# Patient Record
Sex: FEMALE | Race: WHITE | HISPANIC OR LATINO | Employment: UNEMPLOYED | URBAN - METROPOLITAN AREA
[De-identification: names, ages, dates, MRNs, and addresses within clinical notes are randomized per-mention and may not be internally consistent; named-entity substitution may affect disease eponyms.]

---

## 2017-01-04 ENCOUNTER — ALLSCRIPTS OFFICE VISIT (OUTPATIENT)
Dept: OTHER | Facility: OTHER | Age: 22
End: 2017-01-04

## 2017-08-29 ENCOUNTER — HOSPITAL ENCOUNTER (EMERGENCY)
Facility: HOSPITAL | Age: 22
Discharge: HOME | End: 2017-08-29
Attending: EMERGENCY MEDICINE | Admitting: EMERGENCY MEDICINE
Payer: COMMERCIAL

## 2017-08-29 VITALS
SYSTOLIC BLOOD PRESSURE: 127 MMHG | OXYGEN SATURATION: 98 % | HEART RATE: 74 BPM | TEMPERATURE: 98 F | BODY MASS INDEX: 18.18 KG/M2 | RESPIRATION RATE: 16 BRPM | WEIGHT: 90 LBS | DIASTOLIC BLOOD PRESSURE: 77 MMHG

## 2017-08-29 DIAGNOSIS — S05.02XA CORNEAL ABRASION, LEFT, INITIAL ENCOUNTER: Primary | ICD-10-CM

## 2017-08-29 PROCEDURE — 99283 EMERGENCY DEPT VISIT LOW MDM: CPT

## 2017-08-29 RX ORDER — PROPARACAINE HYDROCHLORIDE 5 MG/ML
1 SOLUTION/ DROPS OPHTHALMIC ONCE
Status: COMPLETED | OUTPATIENT
Start: 2017-08-29 | End: 2017-08-29

## 2017-08-29 RX ORDER — ERYTHROMYCIN 5 MG/G
0.5 OINTMENT OPHTHALMIC ONCE
Status: COMPLETED | OUTPATIENT
Start: 2017-08-29 | End: 2017-08-29

## 2017-08-29 RX ADMIN — ERYTHROMYCIN 0.5 INCH: 5 OINTMENT OPHTHALMIC at 20:27

## 2017-08-29 RX ADMIN — PROPARACAINE HYDROCHLORIDE 1 DROP: 5 SOLUTION/ DROPS OPHTHALMIC at 19:39

## 2017-08-29 RX ADMIN — FLUORESCEIN SODIUM 1 STRIP: 1 STRIP OPHTHALMIC at 19:39

## 2018-01-10 NOTE — RESULT NOTES
Message  Obtained HSV results via [facility] portal,GC/CT still pending   Results to RN work pile to scan and task      Signatures   Electronically signed by : Lynn Paris, ; Dec  8 2016  3:57PM EST                       (Author)

## 2018-01-10 NOTE — MISCELLANEOUS
Reason For Visit  Reason For Visit Free Text Note Form: PN PATIENT SEEN FOR ASSESS  Case Management Documentation [facility]:   Information obtained from the patient, patient's significant other and medical record  Patient's financial status unemployed  She is also dealing with additional issues such as language/communication barrier  Patient is participating in Adelina Schulte  Progress Note  MET WITH 19 Y/O- S- G1- - Greek SPEAKING WOMAN  RESIDES WITH FOB  PLAN PREGNANCY  PATIENT DENIES ANY USAGE OF DRUG, ALCOHOL OR SMOKING  HISTORY OF ANXIETY, NEVER TREATED  DENIES ANY DEPRESSION OR ANXIETY AT PRESENT TIME  WILL CALL WIC FOR APPOINTMENT  PATIENT MAIN CONCERN IS THE N / V  ASSISTED WITH INTERPRETATION DURING PRENATAL INTERVIEW  ALSO ASSISTED TO SCHEDULE U/S APPOINTMENT AT [facility]  WILL CONTACT  AS NEEDED  Allergies    1  No Known Drug Allergies    Future Appointments    Date/Time Provider Specialty Site   2016 02:15 PM RICHARD Heredia   Family Medicine [facility]  [address]   2016 02:00 PM  [facility], [address]   Electronically signed by : JENNIE Cho; 2016 10:59AM EST                       (Author)

## 2018-01-11 NOTE — PROGRESS NOTES
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  At the time of our genetic counseling session, Landon Primrose was approximately 12 weeks 6 days pregnant  Counseling was difficult given the language barrier despite a , Drema Brittle, being present to act as   We reviewed the autosomal recessive inheritance of CF and the importance of the patient's partner being tested  He was planning on picking up the test requisition from the clinic and going for blood work as soon as possible  Discussed that if he is also identified as being a CF carrier then there would be a 1/4 or 25% having an affected child and prenatal diagnosis would be available  Explained that if Didi Danni remained negative there will still be a remaining risk for him to be a CF carrier  During our counseling session, histories were taken on the patient's family and her partner's family  Landon Primrose reports having a female maternal first cousin once removed, the daughter of her mother's sister's son, who is in a wheelchair  In addition, this child who is presently 15years of age does not speak and has significant developmental delay  Cedrick Primrose was not able to be specific regarding the cause of the problems in this child therefore is not possible to clearly define any risks that this history could pose to her current pregnancy  Didi Razo reports that one of his sisters had a stillbirth of a pregnancy at approximately 20 weeks gestation  In addition, he described one of his nephews as being "slow"  He was not able to be specific regarding the diagnosis for the Matthew present in these individuals therefore it is not possible to determine whether or not this history would pose any risk for similar problems to the patient's current pregnancy  Both Landon Primrose and Didi Razo report being of [place] descent  They were advised of the availability of carrier screening for SMA and Fragile X syndrome which they elected to decline  Hemoglobinopathy screening is recommended if not previously performed  Lastly, we discussed the fact that it is important to keep in mind that everyone in the general population regardless of age, family history, or medical background has approximately a 3% risk of having a child with some type of her defect, genetic disease or intellectual disability  Currently there are no tests available to rule out all birth defects or health problems  Part of this risk is an age-specific risk for chromosome abnormalities  The patient's age of 25, there is an estimated 1/555 for a fetal chromosome abnormality at term  A proximally half of these abnormalities are due to Down syndrome and the remainder due to other chromosomal abnormalities  This couple was advised of the availability and limitations of sequential screening which they also elected to decline  Active Problems    1  Constipation (564 00) (K59 00)   2  Constipation (564 00) (K59 00)   3  Encounter for pregnancy related examination in second trimester (V22 1) (Z34 82)   4  Encounter to determine fetal viability of pregnancy (V23 87) (O36 80X0)   5  Nausea and vomiting in pregnancy (643 90) (O21 9)   6  Nausea/vomiting in pregnancy (643 90) (O21 9)   7  Supervision of normal first teen pregnancy in first trimester (V22 0) (Z34 01)    Past Medical History    1  History of Patient denies medical problems (V49 89) (Z78 9)    Family History    1  No pertinent family history   2  No pertinent family history    3  No pertinent family history   4  No pertinent family history    Social History    · Never a smoker   · Never a smoker    Current Meds   1  B-6 100 MG Oral Tablet; TAKE 1 TABLET DAILY AS DIRECTED; Therapy: 13Apr2016 to (Evaluate:11Aug2016)  Requested for: 13Apr2016; Last   Rx:13Apr2016 Ordered   2   Colace 100 MG Oral Capsule; TAKE 1 CAPSULE TWICE DAILY AS NEEDED; Therapy: 2016 to (Evaluate:13Dia9194)  Requested for: 2016; Last   Rx:2016 Ordered   3  Colace 100 MG Oral Capsule; Therapy: 2016 to Recorded   4  Prenatal Plus/Iron 27-1 MG Oral Tablet; TAKE 1 TABLET DAILY; Therapy: 2016 to (Evaluate:2017)  Requested for: 2016; Last   Rx:38Hyh2113 Ordered   5  Prenatal Plus/Iron 27-1 MG Oral Tablet; Therapy: 2016 to Recorded   6  Unisom 25 MG Oral Tablet; TAKE 0 5 TABLET 3 TIMES DAILY; Therapy: 2016 to (Evaluate:93Cvj3414)  Requested for: 2016; Last   Rx:2016 Ordered   7  Unisom 25 MG Oral Tablet; TAKE 0 5 TABLET 3 TIMES DAILY; Therapy: 2016 to (Evaluate:55Jhb6898) Recorded   8  Vitamin B-6 100 MG Oral Tablet; Therapy: 2016 to Recorded    Allergies    1  No Known Drug Allergies    2  No Known Environmental Allergies   3   No Known Food Allergies    Future Appointments    Date/Time Provider Specialty Site   2016 10:00 AM  Rory, [address] [place] [address] Road   2016 12:45 PM [facility], Physician Schedule  [address]     Signatures   Electronically signed by : Araceli Ochoa, ; May 11 2016  2:23PM EST                       (Author)

## 2018-01-11 NOTE — RESULT NOTES
Message  Printed GC/CT results 1  from [employer] ,to Segundo Allen       1 Amended By: Graciela Dodd; May 02 2016 4:49 PM EST    Signatures   Electronically signed by : Aarti Titus, ; May  2 2016  4:49PM EST                       (Author)

## 2018-01-12 VITALS
SYSTOLIC BLOOD PRESSURE: 107 MMHG | HEIGHT: 59 IN | BODY MASS INDEX: 19.35 KG/M2 | DIASTOLIC BLOOD PRESSURE: 73 MMHG | WEIGHT: 96 LBS

## 2018-01-13 NOTE — MISCELLANEOUS
Reason For Visit  Reason For Visit Free Text Note Form: SW MET WITH PATIENT DURING POST PARTUM VISIT FOR FOLLOW UP  PATIENT REPORTED SHE IS DOING WELL  DENIES ANY POST PARTUM DEPRESSION SIGNS  ASSISTED WITH INTERPRETATION  PATIENT DESIRE DEPO AS CONTRACEPTION  NO CONCERN AT THIS TIME  Active Problems    1  6 weeks postpartum follow-up (V24 2) (Z39 2)   2  Abnormal placenta, antepartum (656 73) (O43 899)   3  Allergic symptoms, initial encounter (995 3) (T78 40XA)   4  Cholestasis of pregnancy (646 70) (O26 619,K83 1)   5  Constipation (564 00) (K59 00)   6  Cystic fibrosis carrier (V83 81) (Z14 1)   7  Echogenic focus of heart of fetus affecting antepartum care of mother (655 83)   (O35 8XX0)   8  Encounter for initial prescription of injectable contraceptive (V25 02) (Z30 013)   9  Heart burn (787 1) (R12)    Current Meds   1  Docusate Sodium 100 MG Oral Tablet; Take 1 tablet twice daily; Therapy: 48WUX2934 to (Evaluate:30Mar2017)  Requested for: 17PUU7685; Last   Rx:30Nov2016 Ordered   2  Loratadine 10 MG Oral Tablet; take 1 tablet daily as needed; Therapy: 93RVD7135 to (Evaluate:16Nov2016)  Requested for: 94WPT5110; Last   Rx:36Qym7243 Ordered   3  MedroxyPROGESTERone Acetate 150 MG/ML Intramuscular Suspension; INJECT   INTRAMUSCULARLY AS DIRECTED; Therapy: 46JHC4238 to (Last Rx:30Nov2016) Ordered   4  Prenatal Plus/Iron 27-1 MG TABS; TAKE 1 TABLET DAILY; Therapy: 98Gil0969 to (Evaluate:08Jan2017)  Requested for: 13Apr2016; Last   Rx:13Apr2016 Ordered    Allergies    1  No Known Drug Allergies    2  No Known Environmental Allergies   3   No Known Food Allergies    Future Appointments    Date/Time Provider Specialty Site   02/16/2017 09:40 AM [facility], Injection Schedule  [facility] [place]   12/02/2016 10:45 AM [facility] OB Chief, Schedule  [address]     Signatures   Electronically signed by : JENNIE Hernandez; Nov 30 2016  3:46PM EST (Author)

## 2018-01-13 NOTE — RESULT NOTES
Message  Printed GC/CT results from [facility] portal to RN work pile to be scanned and tasked

## 2018-01-14 NOTE — MISCELLANEOUS
Reason For Visit  Reason For Visit Free Text Note Form: MET WITH PATIENT AND FOB AT THEIR REQUEST  PATIENT WITH QUESTION REGARDING INSURANCE  ISSUES ADDRESSED  NO OTHER CONCERN AT THIS TIME  Active Problems    1  Abnormal placenta affecting management of mother in first trimester (656 73) (O43 91)   2  Abnormal placenta affecting management of mother in second trimester (656 73)   (O43 92)   3  Abnormal placenta, antepartum (656 73) (O43 899)   4  Allergic symptoms, initial encounter (995 3) (T78 40XA)   5  Constipation (564 00) (K59 00)   6  Cystic fibrosis carrier (V83 81) (Z14 1)   7  Echogenic focus of heart of fetus affecting antepartum care of mother (655 83)   (O35 8XX0)   8  Encounter for fetal anatomic survey (V28 81) (Z36)   9  Encounter for prenatal care in second trimester of first pregnancy (V22 0) (Z34 02)   10  Encounter for screening for risk of pre-term labor (V28 82) (Z36)   11  Genetic carrier status (V83 89) (Z14 8)   12  Heart burn (787 1) (R12)   13  Nausea and vomiting in pregnancy (643 90) (O21 9)   14  Normal pregnancy (V22 2) (Z34 90)   15  Supervision of normal first teen pregnancy in first trimester (V22 0) (Z34 01)   16  Ultrasound for  screening for fetal growth restriction (V28 4) (Z36)    Current Meds   1  Aspirin 81 MG Oral Tablet Chewable; CHEW AND SWALLOW 1 TABLET DAILY; Therapy: 97IUR9030 to (Evaluate:2017) Recorded   2  Heartburn Relief 75 MG Oral Tablet; TAKE 1 TABLET EVERY 12 HOURS DAILY; Therapy: 29Igz3237 to (Evaluate:2017)  Requested for: 20Sxm0965; Last   Rx:62Alw8812 Ordered   3  Loratadine 10 MG Oral Tablet; take 1 tablet daily as needed; Therapy: 66MWX2611 to (Evaluate:2016)  Requested for: 92ARA2734; Last   Rx:20Omh8757 Ordered   4  Prenatal Plus/Iron 27-1 MG TABS; TAKE 1 TABLET DAILY; Therapy: 34Jhq5726 to (Evaluate:2017)  Requested for: 71Ieh4049; Last   Rx:02Zdx5432 Ordered    Allergies    1  No Known Drug Allergies    2  No Known Environmental Allergies   3   No Known Food Allergies    Future Appointments    Date/Time Provider Specialty Site   10/12/2016 11:30 AM  Rory, [address]   2016 10:30 AM [facility] Chief Resident, Schedule  [facility]  [address]     Signatures   Electronically signed by : JENNIE Garcia; Sep 13 2016 12:07PM EST                       (Author)

## 2018-01-14 NOTE — PROGRESS NOTES
2016         RE: Christiano Deras                                  To: [facility]   MR#: [id number]                                  [address] [place] [address]   : [facility], [address]   ENC: [id number]                             Fax: [phone]   (Exam #: [id number])      The LMP of this 25year old,  G1, P0-0-0-0 patient was unknown, her   working TANIA is 2016 and the current gestational age is 25 weeks 6   days by [address] [place] [address]  A sonographic examination was performed on 2016 using real time equipment  The ultrasound examination was performed   using abdominal & vaginal techniques  The patient has a BMI of 19 6  Her   blood pressure today was 96/64  Earliest ultrasound found in her record: [facility] US 16 8w6d 16 TANIA      Problem list   1  Cystic fibrosis carrier- delta F508   2  Abnormal first and second trimester uterine Dopplers - ASA   3  Isolated EIF LV on Level II      The telephone  was used for history taking and discussion of   results, [id number]  Phoenix Gunnar reports fetal movement and denies vaginal   bleeding  She is taking low-dose aspirin on a daily basis for the   indication of abnormal first trimester uterine artery Dopplers  She did   not have genetic screening performed earlier in the pregnancy        Cardiac motion was observed at 145 bpm       INDICATIONS      fetal anatomical survey   low maternal BMI   cystic fibrosis carrier      Exam Types      Transvaginal   LEVEL II      RESULTS      Fetus # 1 of 1   Vertex presentation   Fetal growth appeared normal   Placenta Location = Posterior   No placenta previa   Placenta Grade = II      MEASUREMENTS (* Included In Average GA)      AC              16 4 cm        21 weeks 1 day * (55%)   BPD              4 9 cm        20 weeks 6 days* (45%)   HC              18 5 cm        20 weeks 5 days* (43%)   Femur            3 5 cm        21 weeks 1 day * (51%)      Nuchal Fold      4 4 mm      Humerus          3 2 cm        20 weeks 5 days  (44%)   Radius           2 8 cm        22 weeks 0 days   Ulna             3 2 cm        22 weeks 2 days   Tibia            2 9 cm        20 weeks 4 days  (29%)   Fibula           2 9 cm        19 weeks 6 days   Foot             3 6 cm        21 weeks 1 day      Cerebellum       2 3 cm        22 weeks 3 days   Biorbit          3 2 cm        20 weeks 4 days   CisternaMagna    4 4 mm      HC/AC           1 13   FL/AC           0 21   FL/BPD          0 71   Ceph Index      0 77   EFW (Ac/Fl/Hc)   404 grams - 0 lbs 14 oz      THE AVERAGE GESTATIONAL AGE is 21 weeks 0 days +/- 10 days  AMNIOTIC FLUID         Largest Vertical Pocket = 5 3 cm   Amniotic Fluid: Normal      UTERINE ARTERIES                                  S/D   PI    RI    NOTCH       Left Uterine Artery              1 80         Yes       Right Uterine Artery             1 54         No      CERVICAL EVALUATION      The cervix appeared normal (Ultrasound Examination)  SUPINE      Cervical Length: 3 30 cm      OTHER TEST RESULTS           Funneling?: No             Dynamic Changes?: No        Resp  To TFP?: No                      Debris?: No      ANATOMY      Head                                    Normal   Face/Neck                               Normal   Th  Cav  Normal   Heart                                   See Details   Abd  Cav  Normal   Stomach                                 Normal   Right Kidney                            Normal   Left Kidney                             Normal   Bladder                                 Normal   Abd  Wall                               Normal   Spine                                   Normal   Extrems                                 Normal   Genitalia                               Normal   Placenta                                Normal   Umbl   Cord Normal   Uterus                                  Normal   PCI                                     Normal      ANATOMY DETAILS      Visualized Appearing Sonographically Normal:   HEAD: (Calvarium, BPD Level, Cavum, Lateral Ventricles, Choroid Plexus,   Cerebellum, Cisterna Magna);    FACE/NECK: (Neck, Nuchal Fold, Profile,   Orbits, Nose/Lips, Palate, Face);    TH  CAV : (Diaphragm); HEART:   (Four Chamber View, Proximal Left Outflow, Proximal Right Outflow, 3   Vessel Trachea, Short Axis of Greater Vessels, Aortic Arch,   Interventricular Septum, Interatrial Septum, IVC, SVC, Cardiac Axis,   Cardiac Position);    ABD  CAV , STOMACH, RIGHT KIDNEY, LEFT KIDNEY,   BLADDER, ABD  WALL, SPINE: (Cervical Spine, Thoracic Spine, Lumbar Spine,   Sacrum);    EXTREMS: (Lt Humerus, Rt Humerus, Lt Forearm, Rt Forearm, Lt   Hand, Rt Hand, Lt Femur, Rt Femur, Lt Low Leg, Rt Low Leg, Lt Foot, Rt   Foot);    GENITALIA (Male), PLACENTA, UMBL  CORD, UTERUS, PCI      Not Visualized:   HEART: (Ductal Arch)      ADNEXA      The left ovary appeared normal and measured 2 1 x 2 9 x 1 0 cm with a   volume of 3 2 cc  The right ovary was not visualized  IMPRESSION      Gale IUP   21 weeks and 0 days by this ultrasound  (TANIA=NOV 16 2016)   20 weeks and 6 days by 1st Tri Sono  (TANIA=NOV 17 2016)   Vertex presentation   Fetal growth appeared normal   Regular fetal heart rate of 145 bpm   Echogenic Intracardiac Focus   Posterior placenta   No placenta previa      GENERAL COMMENT      An apparently isolated echogenic intracardiac focus is noted in the left   ventricle of the fetal heart  No fetal structural abnormality or   ultrasound marker for aneuploidy is otherwise identified on the Level II   ultrasound study today  The ductal arch is suboptimally imaged  The   maternal uterine artery Doppler study remains abnormal, with an elevated   mean pulsatility index   Fetal growth and amniotic fluid volume are normal     The placenta is normal in appearance  The cervix is normal in appearance by transvaginal sonography  Given the otherwise normal ultrasound study today, and in light of the low   a priori risk for Down syndrome based upon Marika's age, the presence of an   apparently isolated echogenic intracardiac focus most likely represents   normal anatomic variation  Invasive prenatal diagnosis by genetic   amniocentesis is not warranted  The abnormal maternal uterine artery   Doppler study is associated with an increased risk for adverse pregnancy   outcomes related to abnormal placentation, including IUGR and   preeclampsia  Daily low dose aspirin therapy initiated at less than 16   weeks gestation is associated with a significant reduction of these risks  Continuation of daily low dose aspirin therapy is recommended through 34   weeks gestation  Shonda Gore will [facility] to the [facility]  in the early   third trimester to assess fetal interval growth  The face to face time, in addition to time spent discussing ultrasound   results, was 10 minutes, greater than 50% of which was spent during   counseling and coordination of care  FABIAN Pete M D     Maternal-Fetal Medicine   Electronically signed 07/06/16 11:38

## 2018-01-15 NOTE — PROGRESS NOTES
AUG 31 2016         RE: Edilberto Speaker                                  To: [facility]   MR#: [id number]                                  [address] [place] [address]   :  [address], [address]   ENC: [id number]                             Fax: [phone]   (Exam #: [id number])      The LMP of this 25year old,  G1, P0-0-0-0 patient was unknown, her   working TANIA is 2016 and the current gestational age is 35 weeks 6   days by [address] [place] [address]  A sonographic examination was performed on AUG   31 2016 using real time equipment  The ultrasound examination was   performed using abdominal technique  The patient has a BMI of 21 8  Her   blood pressure today was 108/65  Ear[place] in her [place] [facility] [place] [place] 16 8w6d 16 TANIA      Problem list   1  Cystic fibrosis carrier- delta F508   2  Abnormal first and second trimester uterine Dopplers - ASA   3  Isolated EIF LV on Level II         Darinel Velez has no complaints today  She reports regular fetal movements and   denies problems related to hypertension,   labor, or vaginal   bleeding  Darinel Velez has not yet been screened for gestational diabetes  She   continues to take 81 mg of aspirin a day for the indication of   persistently abnormal uterine artery Doppler studies earlier in the   pregnancy        Cardiac motion was observed at 140 bpm       INDICATIONS      abnormal uterine Doppler   missed anatomy follow up      Exam Types      Level I      RESULTS      Fetus # 1 of 1   Vertex presentation   Fetal growth appeared normal   Placenta Location = Posterior   No placenta previa   Placenta Grade = II      MEASUREMENTS (* Included In Average GA)      AC              24 7 cm        28 weeks 6 days* (44%)   BPD              7 3 cm        29 weeks 2 days* (52%)   HC              27 2 cm        29 weeks 3 days* (45%)   Femur            5 3 cm        28 weeks 3 days* (31%) Cerebellum       3 4 cm        29 weeks 6 days      HC/AC           1 10   FL/AC           0 22   FL/BPD          0 73   EFW (Ac/Fl/Hc)  1290 grams - 2 lbs 13 oz                 (46%)      THE AVERAGE GESTATIONAL AGE is 29 weeks 0 days +/- 18 days  AMNIOTIC FLUID      Q1: 4 0      Q2: 4 3      Q3: 4 7      Q4: 3 7   REENA Total = 16 7 cm   Amniotic Fluid: Normal      ANATOMY DETAILS      Visualized Appearing Sonographically Normal:   HEAD: (Calvarium, BPD Level, Cavum, Lateral Ventricles, Choroid Plexus,   Cerebellum, Cisterna Magna);    TH  CAV : (Diaphragm); HEART: (Proximal   Left Outflow, Short Axis of Greater Vessels, Ductal Arch, Aortic Arch,   Interventricular Septum, Interatrial Septum, Cardiac Axis);    ABD  CAV ,   STOMACH, RIGHT KIDNEY, LEFT KIDNEY, BLADDER, PLACENTA      Abnormal:   HEART: (Four Chamber View)      ANATOMY COMMENTS      The prior fetal anatomic survey was limited the area of the ductal arch  This anatomic view was seen today as sonographically normal within the   inherent limitations of fetal ultrasound  IMPRESSION      Gale IUP   29 weeks and 0 days by this ultrasound  (TANIA=NOV 16 2016)   28 weeks and 6 days by 1st Tri Sono  (TANIA=NOV 17 2016)   Vertex presentation   Fetal growth appeared normal   Regular fetal heart rate of 140 bpm   Echogenic Intracardiac Focus   Posterior placenta   No placenta previa      GENERAL COMMENT      An apparently isolated echogenic intracardiac focus is identified in the   left ventricle of the fetal heart  No fetal structural abnormality is   otherwise identified on the Level I survey today  Fetal interval growth and amniotic fluid volume are normal       Today's ultrasound findings and suggested follow-up were discussed in   detail with Shonda Gore  Daily third trimester fetal kick counting was   discussed at the visit today  Continuation of daily low dose aspirin   therapy is recommended through 34 weeks gestation   Shonda Gore will return to the [facility]  in 6 weeks to assess interval growth  The face to face time, in addition to time spent discussing ultrasound   results, was 10 minutes, greater than 50% of which was spent during   counseling and coordination of care  FABIAN Donohue M D     Maternal-Fetal Medicine   Electronically signed 08/31/16 18:27

## 2018-01-16 ENCOUNTER — GENERIC CONVERSION - ENCOUNTER (OUTPATIENT)
Dept: OTHER | Facility: OTHER | Age: 23
End: 2018-01-16

## 2018-01-16 NOTE — MISCELLANEOUS
Message  Telephone call to patient with   Informed of CF screen results  and need for FOB to be tested  Patient will pick-up slip for FOB  Has [address]  [facility] appointment scheduled for 5/11/2016--will see genetic counselor prior to seq  Screen      Active Problems    1  Constipation (564 00) (K59 00)   2  Constipation (564 00) (K59 00)   3  Encounter for pregnancy related examination in second trimester (V22 1) (Z34 82)   4  Encounter to determine fetal viability of pregnancy (V23 87) (O36 80X0)   5  Nausea and vomiting in pregnancy (643 90) (O21 9)   6  Nausea/vomiting in pregnancy (643 90) (O21 9)   7  Supervision of normal first teen pregnancy in first trimester (V22 0) (Z34 01)    Current Meds   1  B-6 100 MG Oral Tablet; TAKE 1 TABLET DAILY AS DIRECTED; Therapy: 24Qmv3423 to (Evaluate:11Aug2016)  Requested for: 13Apr2016; Last   Rx:13Apr2016 Ordered   2  Colace 100 MG Oral Capsule; TAKE 1 CAPSULE TWICE DAILY AS NEEDED; Therapy: 13Apr2016 to (Evaluate:94Axn5098)  Requested for: 13Apr2016; Last   Rx:07Xwj8852 Ordered   3  Colace 100 MG Oral Capsule; Therapy: 14Apr2016 to Recorded   4  Prenatal Plus/Iron 27-1 MG Oral Tablet; TAKE 1 TABLET DAILY; Therapy: 22Bqn4798 to (Evaluate:08Jan2017)  Requested for: 13Apr2016; Last   Rx:99Kbb3943 Ordered   5  Prenatal Plus/Iron 27-1 MG Oral Tablet; Therapy: 14Apr2016 to Recorded   6  Unisom 25 MG Oral Tablet; TAKE 0 5 TABLET 3 TIMES DAILY; Therapy: 24Jmd5739 to (Evaluate:46Qas0234)  Requested for: 13Apr2016; Last   Rx:10Vzy5465 Ordered   7  Unisom 25 MG Oral Tablet; TAKE 0 5 TABLET 3 TIMES DAILY; Therapy: 15Jxt0554 to (Evaluate:59Bmh7305) Recorded   8  Vitamin B-6 100 MG Oral Tablet; Therapy: 52Mkn1699 to Recorded    Allergies    1  No Known Drug Allergies    2  No Known Environmental Allergies   3   No Known Food Allergies    Signatures   Electronically signed by : Conrado Valencia RN; May  3 2016  8:44AM EST                       (Author)

## 2018-01-17 NOTE — PROGRESS NOTES
MAY 11 2016         RE: Victor Manuelvin Marino                                  To: [facility]   MR#: [id number]                                  [address] [place] [address]   : [address], [address]   ENC: [id number]                             Fax: [phone]   (Exam #: [id number])      The LMP of this 27year old,  G1, P0-0-0-0 patient was unknown, her   working TANIA is 2016 and the current gestational age is 16 weeks 6   days by [address] [place] [address]  A sonographic examination was performed on MAY   11 2016 using real time equipment  The ultrasound examination was   performed using abdominal technique  The patient has a BMI of 18 8  Her   blood pressure today was 101/65  Earliest ultrasound found in her record: [facility] US 16 8w6d 16 TANIA      Problem list   1  Cystic fibrosis carrier- delta F508   2  Abnormal first trimester uterine Dopplers Multiple longitudinal and   transverse sections revealed a broussard intrauterine pregnancy with the   fetus in variable presentation  The placenta is posterior in implantation,   grade 0 in appearance  Cardiac motion was observed at 153 bpm       INDICATIONS      first trimester genetic screening   low maternal BMI   cystic fibrosis carrier      Exam Types      Level I      RESULTS      Fetus # 1 of 1   Variable presentation      MEASUREMENTS (* Included In Average GA)      CRL              7 0 cm        13 weeks 0 days*   Nuchal Trans    1 30 mm      THE AVERAGE GESTATIONAL AGE is 13 weeks 0 days +/- 7 days  UTERINE ARTERIES                                  S/D   PI    RI    NOTCH       Left Uterine Artery              1 77       Right Uterine Artery             2 95      ANATOMY COMMENTS      Anatomic detail is limited at this gestational age  The yolk sac was not   noted  The fetal cranium appeared normal in shape and the nuchal   translucency was normal in size (1 3mm)    The nasal bone appears to be   present  The intracranial anatomy was unremarkable  Evaluation of the   spine revealed no obvious evidence for a neural tube defect  Anatomy of   the fetal thorax appeared within normal limits  The cardiac rhythm was   regular  Within the abdomen, stomach & bladder were visualized and the   abdominal wall appeared intact  A three vessel cord appears to be present  Active movement of the fetal body & extremities was seen  There is no   suspicion of a subchorionic bleed  The placental cord insertion was   normal   There is no suspicion of a uterine myoma  Free fluid is not seen   in the posterior cul-de-sac  The uterine artery dopplers are abnormal for   gestational age  ADNEXA      The left ovary appeared normal and measured 2 5 x 2 1 x 1 0 cm with a   volume of 2 7 cc  The right ovary appeared normal and measured 2 9 x 1 7 x   1 6 cm with a volume of 4 1 cc       AMNIOTIC FLUID         Largest Vertical Pocket = 2 7 cm   Amniotic Fluid: Normal      IMPRESSION      Gale IUP   13 weeks and 0 days by this ultrasound  (TANIA=NOV 16 2016)   12 weeks and 6 days by 1st Tri Sono  (TANIA=NOV 17 2016)   Variable presentation   Regular fetal heart rate of 153 bpm   Posterior placenta      GENERAL COMMENT      As per your request, a consultation was performed on your patient for the   following indication: sequential screen  and cystic fibrosis carrier      The patient was informed of the findings and counseled about the   limitations of the exam in detecting all forms of fetal congenital   abnormalities  Tiffani Melendez was present to assist with interpretation   during today's visit  She denies any vaginal bleeding or uterine cramping/contractions  Exam shows she is comfortable and her abdomen is non tender  Today's ultrasound findings and suggested follow-up were discussed in   detail with the patient    The Sequential Screen was discussed in detail,   including the sensitivities for detection of Down syndrome, Trisomy 18,   and open neural tube defects  The patient declined genetic screening as   she did not have insurance coverage currently at todays visit  Follow up recommended:   1  Recommend offering a quad screen at her future ob visits as she   declined the sequential screen today due to costs and that she has no   insurance coverage today  2  Fetal Level II ultrasound imaging is recommended at 19-20 weeks'   gestation  3  Uterine artery doppler screen is abnormal and may show she is at risk   for fetal iugr or preeclampsia to develop if this does not resolve by her   second trimester anatomy scan  Recommend starting baby aspirin ( 81mgs   daily) and will review her screen again at 20 weeks  4  She also met today with genetic counseling as she was found to be a   cystic fibrosis carrier  Please see that note under a different cover  Anne Kay, RICHARD Leyva     Maternal-Fetal Medicine   Electronically signed 05/12/16 08:13

## 2018-01-17 NOTE — PROGRESS NOTES
2016         RE: Loren Davis                                  To: [facility]   MR#: [id number]                                  [address] [place] [address]   : [address]   ENC: [id number]                             Fax: [phone]   (Exam #: [id number])      The LMP of this 27year old,  1, para 0 patient was unknown, her   working TANIA is 2016 and the current gestational age is 11 weeks 6   days by [address] [place] [address]  A sonographic examination was performed on 2016 using real time equipment  The ultrasound examination was   performed using abdominal technique  The patient has a BMI of 19 4  Her   blood pressure today was 103/63  Thank you very much for your kind referral of Loren Davis to the   [facility]  in [place] on 2016 for first trimester   ultrasound evaluation and  assessment  Taylor Banegas is an 27year-old   primigravida with uncertain menstrual dating  Via translation from   [address] [place] [address] complains of occasional mild lower abdominal pain, not   accompanied by vaginal bleeding  Her past medical and surgical histories   are unremarkable  She currently takes no medication with the exception of   a prenatal vitamin on a daily basis and has no known drug allergy  She   denies tobacco, alcohol, or illicit drug use during the pregnancy  A normal gestational sac was documented  A normal fetal pole was   visualized  Cardiac motion was observed at 171 bpm  The yolk sac was seen,   measuring 0 40 cm  The amnion was also documented  INDICATIONS      pregnancy dating      Exam Types      Level I      RESULTS      Fetus # 1 of 1   Placenta Location = Posterior   No placenta previa      MEASUREMENTS (* Included In Average GA)      CRL              2 3 cm        8 weeks 6 days *      THE AVERAGE GESTATIONAL AGE is 8 weeks 6 days +/- 5 days        ANATOMY COMMENTS      Anatomic detail is extremely limited at this gestational age  However, a   discrete fetal pole with cardiac and fetal body motion was documented  Limb buds were documented as well  The yolk sac was clearly seen, and the   gestational sac is normal in appearance and located in the fundus of the   uterus  No gross abnormalities were noted on this examination  Free fluid   is not seen in the posterior cul-de-sac  There is no suspicion of a   subchorionic bleed  The predicted estimated due date based upon today's   study is 2016  ADNEXA      The left ovary appeared normal and measured 2 5 x 1 9 x 2 3 cm with a   volume of 5 7 cc  The right ovary appeared normal and measured 2 8 x 1 9 x   2 7 cm with a volume of 7 5 cc  IMPRESSION      Gale IUP   8 weeks and 6 days by this ultrasound  (TANIA=2016)   8 weeks and 6 days by [facility] Tri Sono  (TANIA=2016)   Regular fetal heart rate of 171 bpm   Posterior placenta   No placenta previa      GENERAL COMMENT      Today's ultrasound findings and suggested follow-up were discussed in   detail with Kyleighrashawn Farshad  She will return to the [facility]  in 4 weeks for   a late first trimester assessment of fetal anatomy, along with possible   genetic screening  Level II ultrasound evaluation will be performed at 20   weeks gestation  Her teenage primigravida pregnancy is associated with an   increased risk for adverse pregnancy outcomes, including  delivery,   IUGR, and preeclampsia  Clinical awareness should be maintained in this   regard  The face to face time, in addition to time spent discussing ultrasound   results, was 10 minutes, greater than 50% of which was spent during   counseling and coordination of care  FABIAN Vazquez M D     Maternal-Fetal Medicine   Electronically signed 16 15:07

## 2018-01-23 NOTE — MISCELLANEOUS
Reason For Visit  Reason For Visit Free Text Note Form: MET WITH PATIENT ON HER REQUEST TO ADDRESS INSURANCE QUESTION  INFORMATION GIVEN  Active Problems    1  Allergic symptoms, initial encounter (995 3) (T78 40XA)   2  Constipation (564 00) (K59 00)   3  Cystic fibrosis carrier (V83 81) (Z14 1)   4  Encounter for contraceptive management (V25 9) (Z30 9)   5  Encounter for Depo-Provera contraception (V25 49) (Z30 42)   6  External hemorrhoids (455 3) (K64 4)   7  Heart burn (787 1) (R12)   8  Herpes simplex infection (054 9) (B00 9)   9  Screen for STD (sexually transmitted disease) (V74 5) (Z11 3)    Current Meds   1  Hydrocortisone 2 5 % Rectal Cream (Anusol-HC); INSERT 1 APPLICATORFUL   RECTALLY AT BEDTIME FOR 7 DAYS, THEN AS NEEDED; Therapy: 32IVL8181 to (Last Rx:95Wjg7117)  Requested for: 53IRB9129 Ordered   2  MedroxyPROGESTERone Acetate 150 MG/ML Intramuscular Suspension; INJECT   INTRAMUSCULARLY AS DIRECTED; Therapy: 88HQB7201 to (Last Rx:50Pry3190)  Requested for: 40Ivu3680   Ordered   3  Prenatal Plus/Iron 27-1 MG TABS; TAKE 1 TABLET DAILY; Therapy: 13Apr2016 to (Evaluate:08Jan2017)  Requested for: 13Apr2016; Last   Rx:13Apr2016 Ordered   4  ValACYclovir HCl - 500 MG Oral Tablet; TAKE 1 TABLET TWICE DAILY; Therapy: 14PPZ9810 to (Evaluate:13Jan2017)  Requested for: 67SZR0475; Last   Rx:04Jan2017 Ordered    Allergies    1  No Known Drug Allergies    2  No Known Environmental Allergies   3   No Known Food Allergies    Signatures   Electronically signed by : JENNIE Starkey; Jan 16 2018 11:32AM EST                       (Author)

## 2018-03-26 ENCOUNTER — HOSPITAL ENCOUNTER (EMERGENCY)
Facility: HOSPITAL | Age: 23
Discharge: HOME | End: 2018-03-26
Attending: EMERGENCY MEDICINE
Payer: COMMERCIAL

## 2018-03-26 VITALS
TEMPERATURE: 97.3 F | HEIGHT: 59 IN | BODY MASS INDEX: 21.33 KG/M2 | WEIGHT: 105.8 LBS | DIASTOLIC BLOOD PRESSURE: 66 MMHG | SYSTOLIC BLOOD PRESSURE: 116 MMHG | RESPIRATION RATE: 18 BRPM | OXYGEN SATURATION: 98 % | HEART RATE: 78 BPM

## 2018-03-26 DIAGNOSIS — N61.0 BREAST INFLAMMATION: Primary | ICD-10-CM

## 2018-03-26 PROCEDURE — 99283 EMERGENCY DEPT VISIT LOW MDM: CPT

## 2018-03-26 RX ORDER — IBUPROFEN 400 MG/1
400 TABLET ORAL ONCE
Status: COMPLETED | OUTPATIENT
Start: 2018-03-26 | End: 2018-03-26

## 2018-03-26 RX ADMIN — IBUPROFEN 400 MG: 400 TABLET ORAL at 17:58

## 2018-03-26 NOTE — DISCHARGE INSTRUCTIONS
Autoexamen del seno para las mujeres   LO QUE NECESITA SABER:   ¿Qué es el autoexamen de seno? Un autoexamen de seno es [place] de revisar si sheila senos tienen protuberancias u otros cambios  Los autoexámenes de seno regulares pueden ayudarla a conocer cómo se anupama y se sienten sheila senos normalmente  La mayoría de las protuberancias o cambios en el seno  no son cáncer, tawanna usted siempre debería ir con marie médico para que la revise  Marie médico también puede observarla e indicarle si usted está realizando marie autoexamen correctamente  ¿Por qué debería realizarme un autoexamen de seno? El cáncer de seno es el tipo de cáncer más común en las mujeres  Aún si a usted le [employer], todavía puede seguir revisándose regularmente  Si usted sabe cómo se sienten y se anupama sheila senos normalmente, eso podría ayudarle a determinar cuándo comunicarse con marie médico  Es posible que deb mamografía no detecte algún cáncer  Usted podría encontrar deb protuberancia mauri un autoexamen de seno que no se detectó con marie mamografía  ¿Cuándo debería realizarme un autoexamen de seno? Charles marie calendario para que recuerde hacerse un autoexamen del seno en deb fecha regular  Deb forma fácil de recordar es realizar el autoexamen de seno en el mismo día cada mes  Si usted tiene Adam, es posible que lo mejor sea que se connie un autoexamen 1 semana después de que terminó marie periodo  Lane es cuando sheila senos podrían estar menos inflamados, abultados o sensibles  Usted puede realizarse autoexámenes del seno regulares incluso si está dando de lactar o si tiene implantes en el seno  ¿Cómo debería realizarme un autoexamen de seno? · Observe sheila senos en un usman  Observe el tamaño y la forma de cada seno y del pezón  Revise si hay inflamación, protuberancias, hoyuelos, piel descamada u otros cambios en la piel  Vigile los cambios en el pezón, julia cuando tiene dolor o que comienza a hundirse   Apriete cuidadosamente ambos pezones y revise si sale líquido (que no sea [place]) de ellos  Si usted detecta cualquiera de estos u otros cambios en sheila senos, comuníquese con marie médico  Revise sheila senos mientras está sentada o monteiro en las 3 siguientes posiciones:    ¨ Cuelgue sheila brazos en sheila costados  ¨ Levante sheila lucy y [place] detrás de marie lalo  ¨ Ejerza presión firme con sheila lucy sobre sheila caderas  Inclínese un poco hacia adelante mientras observa sheila senos en el usman  · Acuéstese y palpe sheila senos  Cuando usted se [place], el tejido de sheila senos se extiende uniformemente sobre marie pecho  [place] facilita que usted sienta protuberancias o cualquier cosa que podría no ser normal para sheila senos  Realice un autoexamen con un seno a la vez  ¨ Coloque deb almohada pequeña o deb toalla debajo de marie hombro jeff  Coloque marie brazo jeff detrás de marie lalo  ¨ Use los 3 dedos medios de marie mano derecha  Use las yemas de los dedos, en la parte superior  La yema del dedo es la parte más sensible de marie dedo  ¨ Damaris círculos pequeños para sentir el tejido del seno  Use las yemas de sheila dedos para hacer círculos pequeños empalmados sobre sheila senos y [place]  Use presión ligera, media y firme  Babs, presione ligeramente  Después, presione con deb presión media para sentir un poco más profundo en marie seno  Por último, use presión firme para sentir marie seno en lo más profundo  ¨ Examine el área completa de marie seno  Examine el área del seno desde arriba hasta abajo donde usted siente las [place]  [employer]rie [employer] pequeños con las yemas de los dedos, comenzando en la parte media de marie axila  Orrie Fin[employer] subiendo y [place] el área del seno  Continúe hacia marie seno, hasta llegar al [employer]  Examine toda el área desde la axila hasta el centro de marie pecho (Jocelyne Gomez)  Deténgase en el centro de marie pecho      ¨ Mueva la almohada o toalla hacia marie hombro derecho y coloque marie Madina Fragmin derecho detrás de adams lalo  Use las yemas de los 3 dedos medios de adams mano izquierda y repita los pasos anteriores para realizar un autoexamen en adams seno derecho  ¿Qué más puedo hacer para la revisión de problemas de seno o del cáncer de seno? Algunos expertos sugieren que las mujeres de 36 años de edad y mayores deberían realizarse deb mamografía cada año  Otros sugieren [employer] 50 y 78 años de edad se karlene deb mamografía cada 2 años  Consulte con adams médico sobre cuándo usted debería [employer]  ¿Cuándo dallin comunicarme con mi médico?   · Usted encuentra algún tipo de bulto o cambio en sheila senos  · Usted tiene [facility], o le sale líquido de sheila pezones  · Usted tiene preguntas o inquietudes acerca de adams condición o cuidado  ACUERDOS SOBRE ADAMS CUIDADO:   Usted tiene el derecho de ayudar a planear adams cuidado  Aprenda todo lo que pueda sobre adams condición y julia darle tratamiento  Discuta sheila opciones de tratamiento con sheila médicos para decidir el cuidado que usted desea recibir  Usted siempre tiene el derecho de rechazar el tratamiento  Esta información es sólo para uso en educación  Adams intención no es darle un consejo médico sobre enfermedades o tratamientos  Colsulte con adams Mariposa Kane farmacéutico antes de seguir cualquier régimen médico para saber si es seguro y efectivo para usted  © 2017 [address] Information is for End User's use only and may not be sold, redistributed or otherwise used for commercial purposes  All illustrations and images included in CareNotes® are the copyrighted property of A D A M , Inc  or Thor Castillo

## 2018-03-26 NOTE — ED PROVIDER NOTES
History  Chief Complaint   Patient presents with    Breast Pain     left breast pain last night , no fever or chills     This is a 28-year-old female that presents today with left breast pain  Since yesterday patient noticed a pimple near her left area areola  Less than 0 5 cm with mild tenderness  No fluctuance  Patient has not been doing anything for the pimple  No pain medications  No fevers or chills  Patient is not breast feeding currently  No drainage from the nipple  No previous history of mastitis or abscesses in the breast   Patient no history of diabetes  28-year-old female that presents today with left breast pain  On exam patient does did have 1 small pimple with no indications of drainage currently  Discussed with  and patient regarding return precautions  Currently warm compresses and over-the-counter pain medication  None       History reviewed  No pertinent past medical history  History reviewed  No pertinent surgical history  History reviewed  No pertinent family history  I have reviewed and agree with the history as documented  Social History   Substance Use Topics    Smoking status: Never Smoker    Smokeless tobacco: Never Used    Alcohol use No        Review of Systems   Constitutional: Negative  Negative for diaphoresis and fever  HENT: Negative  Eyes: Negative  Respiratory: Negative  Negative for cough, shortness of breath and wheezing  Cardiovascular: Negative  Negative for chest pain, palpitations and leg swelling  Gastrointestinal: Negative for abdominal distention, abdominal pain, nausea and vomiting  Endocrine: Negative  Genitourinary: Negative  Musculoskeletal: Negative  Left breast pain   Skin: Negative  Neurological: Negative  Psychiatric/Behavioral: Negative  All other systems reviewed and are negative        Physical Exam  ED Triage Vitals [03/26/18 1643]   Temperature Pulse Respirations Blood Pressure SpO2   (!) 97 3 °F (36 3 °C) 78 18 116/66 98 %      Temp Source Heart Rate Source Patient Position - Orthostatic VS BP Location FiO2 (%)   Tympanic Monitor Sitting Left arm --      Pain Score       5           Orthostatic Vital Signs  Vitals:    03/26/18 1643   BP: 116/66   Pulse: 78   Patient Position - Orthostatic VS: Sitting       Physical Exam   Constitutional: She is oriented to person, place, and time  She appears well-developed  HENT:   Head: Normocephalic and atraumatic  Nose: Nose normal    Mouth/Throat: Oropharynx is clear and moist    Eyes: EOM are normal  Pupils are equal, round, and reactive to light  Neck: Normal range of motion  Neck supple  Cardiovascular: Normal rate, regular rhythm and normal heart sounds  No murmur heard  Pulmonary/Chest: Effort normal and breath sounds normal    Abdominal: Soft  Bowel sounds are normal  She exhibits no distension  There is no tenderness  There is no rebound and no guarding  Musculoskeletal: Normal range of motion  Left breast near areola same pimple with no flutance, no surrounding erythema    Neurological: She is alert and oriented to person, place, and time  Skin: Skin is warm and dry  Psychiatric: She has a normal mood and affect  ED Medications  Medications   ibuprofen (MOTRIN) tablet 400 mg (400 mg Oral Given 3/26/18 1758)       Diagnostic Studies  Results Reviewed     None                 No orders to display         Procedures  Procedures      Phone Consults  ED Phone Contact    ED Course  ED Course                                MDM  Number of Diagnoses or Management Options  Breast inflammation:   Diagnosis management comments: Inflamed breast follicle  Discussed regarding warm compressions  Will follow up with gyn    If any worsening of symptoms she will return to the ED    CritCare Time    Disposition  Final diagnoses:   Breast inflammation     Time reflects when diagnosis was documented in both MDM as applicable and the Disposition within this note     Time User Action Codes Description Comment    3/26/2018  6:18 PM Kingsley Taylor  8  [N61 0] Breast inflammation       ED Disposition     ED Disposition Condition Comment    Discharge  Ana Gibson discharge to home/self care  Condition at discharge: Good        Follow-up Information     Follow up With Specialties Details Why Jaswant Ayala MD Obstetrics and Gynecology, Gynecology Schedule an appointment as soon as possible for a visit As needed, If symptoms worsen  warm compresses  and tylenol and ibuprofen  if any worsening please return for abscess  [address] [facility]  [phone]          There are no discharge medications for this patient  No discharge procedures on file  ED Provider  Attending physically available and evaluated Ana Gibson  KEVIN managed the patient along with the ED Attending      Electronically Signed by         Washington Morel MD  03/27/18 9637

## 2018-03-26 NOTE — ED ATTENDING ATTESTATION
Luiz Paniagua MD, saw and evaluated the patient  I have discussed the patient with the resident/non-physician practitioner and agree with the resident's/non-physician practitioner's findings, Plan of Care, and MDM as documented in the resident's/non-physician practitioner's note, except where noted  All available labs and Radiology studies were reviewed  At this point I agree with the current assessment done in the Emergency Department  I have conducted an independent evaluation of this patient a history and physical is as follows:      Critical Care Time  CritCare Time  OA: 24 y/o f c/o pimple on her L breast just next to her nipple  No associated discharge/bleeding  No nipple pain  No fevers  No diabetes/immunocompromise  Not breastfeeding  No family history of breast disease  PE, well developed f in NAD, VSS, NC/AT, MMM, neck supple, RR, lungs CTAB, + mildly erythematous gland surrounding nipple, mildly ttp, no discharge, no bleeding, no palpable mass, no warmth, no axillary LN, abd soft, +BS, ENCARNACION/no swelling, AAOx3  A/p inflamed duct, supportive care discussed return and f/u precautions   Will give info for GYN and f/u ultrasound should be performed  Procedures

## 2018-04-03 ENCOUNTER — CLINICAL SUPPORT (OUTPATIENT)
Dept: OBGYN CLINIC | Facility: HOSPITAL | Age: 23
End: 2018-04-03
Payer: COMMERCIAL

## 2018-04-03 DIAGNOSIS — Z30.9 ENCOUNTER FOR CONTRACEPTIVE MANAGEMENT, UNSPECIFIED TYPE: Primary | ICD-10-CM

## 2018-04-03 PROCEDURE — 96372 THER/PROPH/DIAG INJ SC/IM: CPT

## 2018-04-03 RX ORDER — MEDROXYPROGESTERONE ACETATE 150 MG/ML
150 INJECTION, SUSPENSION INTRAMUSCULAR ONCE
Status: COMPLETED | OUTPATIENT
Start: 2018-04-03 | End: 2018-04-03

## 2018-04-03 RX ADMIN — MEDROXYPROGESTERONE ACETATE 150 MG: 150 INJECTION, SUSPENSION INTRAMUSCULAR at 09:34

## 2018-05-22 ENCOUNTER — OFFICE VISIT (OUTPATIENT)
Dept: OBGYN CLINIC | Facility: HOSPITAL | Age: 23
End: 2018-05-22
Payer: COMMERCIAL

## 2018-05-22 VITALS
BODY MASS INDEX: 20.62 KG/M2 | HEART RATE: 81 BPM | SYSTOLIC BLOOD PRESSURE: 121 MMHG | WEIGHT: 105 LBS | DIASTOLIC BLOOD PRESSURE: 82 MMHG | HEIGHT: 60 IN

## 2018-05-22 DIAGNOSIS — B37.9 YEAST INFECTION: ICD-10-CM

## 2018-05-22 DIAGNOSIS — Z11.3 SCREEN FOR STD (SEXUALLY TRANSMITTED DISEASE): ICD-10-CM

## 2018-05-22 DIAGNOSIS — Z23 NEED FOR HPV VACCINATION: ICD-10-CM

## 2018-05-22 DIAGNOSIS — Z01.419 ENCOUNTER FOR ANNUAL ROUTINE GYNECOLOGICAL EXAMINATION: Primary | ICD-10-CM

## 2018-05-22 PROCEDURE — 90651 9VHPV VACCINE 2/3 DOSE IM: CPT | Performed by: OBSTETRICS & GYNECOLOGY

## 2018-05-22 PROCEDURE — 99395 PREV VISIT EST AGE 18-39: CPT | Performed by: OBSTETRICS & GYNECOLOGY

## 2018-05-22 PROCEDURE — 90471 IMMUNIZATION ADMIN: CPT | Performed by: OBSTETRICS & GYNECOLOGY

## 2018-05-22 RX ORDER — MEDROXYPROGESTERONE ACETATE 150 MG/ML
INJECTION, SUSPENSION INTRAMUSCULAR
COMMUNITY
Start: 2018-04-03 | End: 2018-10-25

## 2018-05-22 NOTE — PROGRESS NOTES
ASSESMENT & PLAN    27 y/o presents for annual gynecological exam  Patient complains of vaginal itching     · Vaginal itching: Vaginal yeast infection as evidence by KOH slide showing dermatophytic hyphae  · Monistat 7, advised to use as directed  Common side effects reviewed  · Gc/Chl sent   · Gynecological exam  · Pap smear screening   · Breast exam normal no mass, discharge, skin changes noted  Advised monthly self breath exam  · Advised healthy well balanced diet  Daily weight bearing and cardiovascular exercise for at least 45 minutes  · HPV immunization: series #1   · Contraception: Depo-Provera  · Advised increased daily calcium intake       SUBJECTIVE:             Patient is a 26 y o  at [place] [address] by ENRIQUE, who presents for her annual gyn physical  Patient complains of vaginal itching  She states this started over the last week  She is sexually active with one male partner her   They do not use condoms  Otherwise she has no complaints  Review of Systems   Review of Systems   Constitutional: Negative  HENT: Negative  Eyes: Negative  Respiratory: Negative  Cardiovascular: Negative  Gastrointestinal: Negative  Endocrine: Negative  Genitourinary: Negative for dysuria, menstrual problem, pelvic pain, vaginal bleeding, vaginal discharge and vaginal pain  Vaginal itching   Musculoskeletal: Negative  Skin: Negative  Allergic/Immunologic: Negative  Neurological: Negative  Hematological: Negative  Psychiatric/Behavioral: Negative  Gyn History:  Menstrual cycles stopped 2/2 to contraception states she has spotting tarting a couple days prior to injection  Patient is currently sexually active, heterosexual with    History of sexually transmitted infection: No   History of abnormal pap smears:  No     Birth control: Depo-Provera    Health Maintenance:    She does not eats an adequate and healthy diet     She she does not exercise but states her child keeps her busy  She wears her seatbelt routinely  She feels safe at home  Screening Exams:    PAP smear: performed today in office  Mammogram: not indicated  Colonoscopy: not indicated  DEXA scan: not indicated    OB Hx:  Obstetric History       T0      L1     SAB0   TAB0   Ectopic0   Multiple0   Live Births1       # Outcome Date GA Lbr Pradip/2nd Weight Sex Delivery Anes PTL Lv   1  10/16/16 35w3d / 00:50 2438 g (5 lb 6 oz) M Vag-Vacuum Local Y GLORY      Name: Germaine Xiong  ([address])      Apgar1:  8                Apgar5: 9        Medical Hx  History reviewed  No pertinent past medical history  Surgical Hx  Past Surgical History:   Procedure Laterality Date    VAGINAL DELIVERY       Family Hx  Family History   Problem Relation Age of Onset    No Known Problems Mother     No Known Problems Father      Social Hx  Social History     Social History    Marital status: Single     Spouse name: N/A    Number of children: N/A    Years of education: N/A     Occupational History    Not on file  Social History Main Topics    Smoking status: Never Smoker    Smokeless tobacco: Never Used    Alcohol use No    Drug use: No    Sexual activity: Yes     Partners: Male     Birth control/ protection: Injection     Other Topics Concern    Not on file     Social History Narrative    No narrative on file     Allergies  No Known Allergies  Meds    Current Outpatient Prescriptions:     MedroxyPROGESTERone Acetate 150 MG/ML ABEL, , Disp: , Rfl:     miconazole (MONISTAT-7) 2 % vaginal cream, Insert 1 applicator into the vagina daily at bedtime, Disp: 45 g, Rfl: 0    OBJECTIVE:               Vitals  Vitals:    18 1345   BP: 121/82   Pulse: 81       Physical Exam   Constitutional: She is oriented to person, place, and time  She appears well-developed and well-nourished  No distress  HENT:   Head: Normocephalic and atraumatic     Right Ear: External ear normal    Left Ear: External ear normal  Nose: Nose normal    Mouth/Throat: Oropharynx is clear and moist  No oropharyngeal exudate  Eyes: Conjunctivae and EOM are normal  Pupils are equal, round, and reactive to light  Right eye exhibits no discharge  Left eye exhibits no discharge  No scleral icterus  Neck: Normal range of motion  Neck supple  No tracheal deviation present  No thyromegaly present  Cardiovascular: Normal rate, regular rhythm, normal heart sounds and intact distal pulses  Exam reveals no gallop and no friction rub  No murmur heard  Pulmonary/Chest: Effort normal and breath sounds normal  No stridor  No respiratory distress  She has no wheezes  She has no rales  Abdominal: Soft  Bowel sounds are normal  She exhibits no distension  There is no tenderness  There is no rebound and no guarding  Genitourinary: Vagina normal  No breast swelling, tenderness, discharge or bleeding  Cervix exhibits no motion tenderness, no discharge and no friability  Right adnexum displays no mass, no tenderness and no fullness  Left adnexum displays no mass, no tenderness and no fullness  No erythema or bleeding in the vagina  No vaginal discharge found  Musculoskeletal: Normal range of motion  She exhibits no edema, tenderness or deformity  Lymphadenopathy:     She has no cervical adenopathy  Neurological: She is alert and oriented to person, place, and time  She displays normal reflexes  No cranial nerve deficit  She exhibits normal muscle tone  Coordination normal    Skin: Skin is warm and dry  No rash noted  She is not diaphoretic  No erythema  No pallor  Psychiatric: She has a normal mood and affect   Her behavior is normal      KOH: + hyphae   [place]et [place]: negative for clue cells or trichomonads     Dorathy Meckel  PGY-2 Family Medicine   5/22/2018

## 2018-05-25 LAB
C TRACH RRNA SPEC QL NAA+PROBE: NEGATIVE
N GONORRHOEA RRNA SPEC QL NAA+PROBE: NEGATIVE

## 2018-05-30 LAB
CYTOLOGIST CVX/VAG CYTO: NORMAL
DX ICD CODE: NORMAL
OTHER STN SPEC: NORMAL
PATH REPORT.FINAL DX SPEC: NORMAL
SL AMB NOTE:: NORMAL
SL AMB SPECIMEN ADEQUACY: NORMAL

## 2018-06-26 ENCOUNTER — CLINICAL SUPPORT (OUTPATIENT)
Dept: OBGYN CLINIC | Facility: HOSPITAL | Age: 23
End: 2018-06-26
Payer: COMMERCIAL

## 2018-06-26 DIAGNOSIS — Z30.42 ENCOUNTER FOR DEPO-PROVERA CONTRACEPTION: Primary | ICD-10-CM

## 2018-06-26 PROCEDURE — 96372 THER/PROPH/DIAG INJ SC/IM: CPT

## 2018-06-26 RX ORDER — MEDROXYPROGESTERONE ACETATE 150 MG/ML
150 INJECTION, SUSPENSION INTRAMUSCULAR
Status: COMPLETED | OUTPATIENT
Start: 2018-06-26 | End: 2019-05-22

## 2018-06-26 RX ADMIN — MEDROXYPROGESTERONE ACETATE 150 MG: 150 INJECTION, SUSPENSION INTRAMUSCULAR at 13:15

## 2018-06-26 NOTE — PROGRESS NOTES
Depo-Provera      [x]   Patient provided box YES   Vial or syringe SYRINGE   Last  Annual/Pap Date: 5/22/18  [facility] Last Depo date: 4/3/18   Side effects:NA    HCG; if applicable: NA   Given by: KAILA    Site: LD   Next appt  due: 12 WKS   Calcium supplement daily teaching, condoms for 2 weeks following first injection dose

## 2018-09-04 ENCOUNTER — OFFICE VISIT (OUTPATIENT)
Dept: URGENT CARE | Facility: CLINIC | Age: 23
End: 2018-09-04
Payer: COMMERCIAL

## 2018-09-04 VITALS
TEMPERATURE: 98.8 F | SYSTOLIC BLOOD PRESSURE: 116 MMHG | DIASTOLIC BLOOD PRESSURE: 78 MMHG | HEART RATE: 77 BPM | HEIGHT: 60 IN | RESPIRATION RATE: 16 BRPM | BODY MASS INDEX: 20.22 KG/M2 | OXYGEN SATURATION: 98 % | WEIGHT: 103 LBS

## 2018-09-04 DIAGNOSIS — M54.50 ACUTE RIGHT-SIDED LOW BACK PAIN WITHOUT SCIATICA: Primary | ICD-10-CM

## 2018-09-04 LAB
SL AMB  POCT GLUCOSE, UA: NEGATIVE
SL AMB LEUKOCYTE ESTERASE,UA: ABNORMAL
SL AMB POCT BILIRUBIN,UA: NEGATIVE
SL AMB POCT BLOOD,UA: ABNORMAL
SL AMB POCT CLARITY,UA: ABNORMAL
SL AMB POCT COLOR,UA: ABNORMAL
SL AMB POCT KETONES,UA: NEGATIVE
SL AMB POCT NITRITE,UA: NEGATIVE
SL AMB POCT PH,UA: 6.5
SL AMB POCT SPECIFIC GRAVITY,UA: 1.01
SL AMB POCT URINE PROTEIN: NEGATIVE
SL AMB POCT UROBILINOGEN: 0.2

## 2018-09-04 PROCEDURE — 99283 EMERGENCY DEPT VISIT LOW MDM: CPT | Performed by: PHYSICIAN ASSISTANT

## 2018-09-04 PROCEDURE — G0382 LEV 3 HOSP TYPE B ED VISIT: HCPCS | Performed by: PHYSICIAN ASSISTANT

## 2018-09-04 RX ORDER — KETOROLAC TROMETHAMINE 30 MG/ML
30 INJECTION, SOLUTION INTRAMUSCULAR; INTRAVENOUS ONCE
Status: COMPLETED | OUTPATIENT
Start: 2018-09-04 | End: 2018-09-04

## 2018-09-04 RX ORDER — METHOCARBAMOL 500 MG/1
500 TABLET, FILM COATED ORAL 3 TIMES DAILY
Qty: 15 TABLET | Refills: 0 | Status: SHIPPED | OUTPATIENT
Start: 2018-09-04 | End: 2018-10-25

## 2018-09-04 RX ADMIN — KETOROLAC TROMETHAMINE 30 MG: 30 INJECTION, SOLUTION INTRAMUSCULAR; INTRAVENOUS at 16:23

## 2018-09-04 RX ADMIN — KETOROLAC TROMETHAMINE 30 MG: 30 INJECTION, SOLUTION INTRAMUSCULAR; INTRAVENOUS at 16:22

## 2018-09-04 NOTE — PATIENT INSTRUCTIONS
Torso given in clinic  Take Robaxin as directed  Follow-up with PCP if no improvement in symptoms in 2 days  If anything worsens go to the ER

## 2018-09-04 NOTE — PROGRESS NOTES
NAME: Taina Gallo is a 26 y o  female  : 1995    MRN: [id number]      Assessment and Plan   Acute right-sided low back pain without sciatica [M54 5]  1  Acute right-sided low back pain without sciatica  POCT urine dip    Urine culture    ketorolac (TORADOL) injection 30 mg    ketorolac (TORADOL) injection 30 mg    methocarbamol (ROBAXIN) 500 mg tablet     Administrations This Visit     ketorolac (TORADOL) injection 30 mg     Admin Date  2018 Action  Given Dose  30 mg Route  Intramuscular Administered By  Maxime Bowie RN           Admin Date  2018 Action  Given Dose  30 mg Route  Intramuscular Administered By  Maxime Bowie RN              Urine dip positive for large blood and small leukocytes  Patient reports she currently has her period  Patient Instructions   Patient Instructions   Torso given in clinic  Take Robaxin as directed  Follow-up with PCP if no improvement in symptoms in 2 days  If anything worsens go to the ER    Proceed to ER if symptoms worsen  History of Present Illness     Patient presents complaining of left-sided lumbar back pain x3 weeks   [id number] used for H&P  She is accompanied by   She reports that the pain started on the right side of her back and eventually moved only to the left side of her back  She denies any injury, fall or inciting event  She denies any UTI symptoms such as dysuria, hematuria, fever, chills, nausea, vomiting, frequency, urgency, abdominal pain  She denies any chest pain, shortness of breath or dyspnea but does report that sometimes it hurts take a deep breath because it causes her back to hurt  She denies any medical history  Does not take any medications on a daily basis  She reports the pain feels like a stabbing feeling and says that it is constant but worse with movement especially standing and reports that with that it radiates to the front    She has not taken anything over-the-counter for this nor has she applied ice or heat  She denies any history related to her kidneys or diabetes  She denies any bowel or bladder dysfunction or numbness or tingling down the leg  Review of Systems   Review of Systems   Constitutional: Negative for chills and fever  Respiratory: Negative for cough, shortness of breath, wheezing and stridor  Cardiovascular: Negative for chest pain, palpitations and leg swelling  Gastrointestinal: Negative for abdominal pain, diarrhea, nausea and vomiting  Genitourinary: Negative for dysuria, frequency, hematuria and urgency  Musculoskeletal: Positive for back pain  Current Medications       Current Outpatient Prescriptions:     MedroxyPROGESTERone Acetate 150 MG/ML ABEL, , Disp: , Rfl:     methocarbamol (ROBAXIN) 500 mg tablet, Take 1 tablet (500 mg total) by mouth 3 (three) times a day for 5 days, Disp: 15 tablet, Rfl: 0    miconazole (MONISTAT-7) 2 % vaginal cream, Insert 1 applicator into the vagina daily at bedtime (Patient not taking: Reported on 9/4/2018 ), Disp: 45 g, Rfl: 0    Current Facility-Administered Medications:     MedroxyPROGESTERone Acetate ABEL 150 mg, 150 mg, Intramuscular, Q3 Months, Renato Jalloh MD, 150 mg at 06/26/18 1315    Current Allergies     Allergies as of 09/04/2018    (No Known Allergies)              No past medical history on file  Past Surgical History:   Procedure Laterality Date    VAGINAL DELIVERY         Family History   Problem Relation Age of Onset    No Known Problems Mother     No Known Problems Father          Medications have been verified      The following portions of the patient's history were reviewed and updated as appropriate: allergies, current medications, past family history, past medical history, past social history, past surgical history and problem list     Objective   /78   Pulse 77   Temp 98 8 °F (37 1 °C)   Resp 16   Ht 5' (1 524 m)   Wt 46 7 kg (103 lb)   SpO2 98%   BMI 20 12 kg/m²      Physical Exam     Physical Exam   Constitutional: She appears well-developed and well-nourished  No distress  Cardiovascular: Normal rate, regular rhythm and normal heart sounds  Pulmonary/Chest: Effort normal and breath sounds normal  No respiratory distress  She has no wheezes  She has no rales  Abdominal: Soft  She exhibits no distension  There is tenderness (Mild tenderness with deep palpation in the lower left quadrant more towards the midaxillary line causing reproduced back pain)  There is no rebound and no guarding  Musculoskeletal:   Lumbar spine:  Not tender to palpation over midline  Tender to palpation over the left PVMs and left distal PVMs with spasm  Patient reports this reproduces the pain she feels  Full Active range of motion of lumbar spine with pain on returning to standing from full flexion as well as extension    Full strength in lower extremities bilaterally

## 2018-09-06 LAB
BACTERIA UR CULT: NORMAL
Lab: NO GROWTH

## 2018-09-18 ENCOUNTER — CLINICAL SUPPORT (OUTPATIENT)
Dept: OBGYN CLINIC | Facility: HOSPITAL | Age: 23
End: 2018-09-18
Payer: COMMERCIAL

## 2018-09-18 DIAGNOSIS — Z30.42 ENCOUNTER FOR SURVEILLANCE OF INJECTABLE CONTRACEPTIVE: ICD-10-CM

## 2018-09-18 PROCEDURE — 96372 THER/PROPH/DIAG INJ SC/IM: CPT

## 2018-09-18 RX ADMIN — MEDROXYPROGESTERONE ACETATE 150 MG: 150 INJECTION, SUSPENSION INTRAMUSCULAR at 09:49

## 2018-09-18 NOTE — PROGRESS NOTES
Depo-Provera      [x]   Patient provided niko Shin    Last  Annual/Pap Date: 5/2018  [facility] Last Depo date: 6/26/18   Side effects: none reported   HCG; if applicable: N/A   Given by: K Reyes   Site: [facility]   Next appt  due: 12/4/18 - 12/18/18   Calcium supplement daily teaching, condoms for 2 weeks following first injection dose

## 2018-09-23 ENCOUNTER — HOSPITAL ENCOUNTER (EMERGENCY)
Facility: HOSPITAL | Age: 23
Discharge: LEFT BEFORE DISCHARGE | End: 2018-09-23
Payer: COMMERCIAL

## 2018-09-23 VITALS
DIASTOLIC BLOOD PRESSURE: 64 MMHG | SYSTOLIC BLOOD PRESSURE: 110 MMHG | OXYGEN SATURATION: 98 % | RESPIRATION RATE: 18 BRPM | HEART RATE: 86 BPM | BODY MASS INDEX: 20.21 KG/M2 | HEIGHT: 60 IN | WEIGHT: 102.95 LBS | TEMPERATURE: 98.2 F

## 2018-09-23 LAB
BACTERIA UR QL AUTO: ABNORMAL /HPF
BILIRUB UR QL STRIP: NEGATIVE
CLARITY UR: ABNORMAL
COLOR UR: ABNORMAL
EXT PREG TEST URINE: NEGATIVE
GLUCOSE UR STRIP-MCNC: NEGATIVE MG/DL
HGB UR QL STRIP.AUTO: NEGATIVE
HYALINE CASTS #/AREA URNS LPF: ABNORMAL /LPF
KETONES UR STRIP-MCNC: NEGATIVE MG/DL
LEUKOCYTE ESTERASE UR QL STRIP: ABNORMAL
NITRITE UR QL STRIP: NEGATIVE
NON-SQ EPI CELLS URNS QL MICRO: ABNORMAL /HPF
PH UR STRIP.AUTO: 7.5 [PH] (ref 4.5–8)
PROT UR STRIP-MCNC: NEGATIVE MG/DL
RBC #/AREA URNS AUTO: ABNORMAL /HPF
SP GR UR STRIP.AUTO: 1.02 (ref 1–1.03)
UROBILINOGEN UR QL STRIP.AUTO: 1 E.U./DL
WBC #/AREA URNS AUTO: ABNORMAL /HPF

## 2018-09-23 PROCEDURE — 81001 URINALYSIS AUTO W/SCOPE: CPT

## 2018-09-23 PROCEDURE — 81025 URINE PREGNANCY TEST: CPT

## 2018-10-25 ENCOUNTER — HOSPITAL ENCOUNTER (EMERGENCY)
Facility: HOSPITAL | Age: 23
Discharge: HOME | End: 2018-10-25
Attending: EMERGENCY MEDICINE | Admitting: EMERGENCY MEDICINE
Payer: COMMERCIAL

## 2018-10-25 VITALS
HEIGHT: 61 IN | TEMPERATURE: 96.8 F | BODY MASS INDEX: 20.77 KG/M2 | SYSTOLIC BLOOD PRESSURE: 138 MMHG | RESPIRATION RATE: 20 BRPM | OXYGEN SATURATION: 96 % | DIASTOLIC BLOOD PRESSURE: 88 MMHG | WEIGHT: 110 LBS | HEART RATE: 98 BPM

## 2018-10-25 DIAGNOSIS — T40.601A OVERDOSE OPIATE, ACCIDENTAL OR UNINTENTIONAL, INITIAL ENCOUNTER (HCC): Primary | ICD-10-CM

## 2018-10-25 DIAGNOSIS — K59.03 CONSTIPATION DUE TO OPIOID THERAPY: ICD-10-CM

## 2018-10-25 DIAGNOSIS — T40.2X5A CONSTIPATION DUE TO OPIOID THERAPY: ICD-10-CM

## 2018-10-25 LAB
CLARITY, POC: CLEAR
COLOR, POC: YELLOW
EXT BILIRUBIN, UA: NEGATIVE
EXT BLOOD URINE: NEGATIVE
EXT GLUCOSE, UA: NEGATIVE
EXT KETONES: NEGATIVE
EXT NITRITE, UA: NEGATIVE
EXT PH, UA: 6
EXT PREG TEST URINE: NEGATIVE
EXT PROTEIN, UA: NEGATIVE
EXT SPECIFIC GRAVITY, UA: 1.01
EXT UROBILINOGEN: 0.2
WBC # BLD EST: NEGATIVE 10*3/UL

## 2018-10-25 PROCEDURE — 81025 URINE PREGNANCY TEST: CPT | Performed by: PHYSICIAN ASSISTANT

## 2018-10-25 PROCEDURE — 99283 EMERGENCY DEPT VISIT LOW MDM: CPT

## 2018-10-25 PROCEDURE — 81002 URINALYSIS NONAUTO W/O SCOPE: CPT | Performed by: PHYSICIAN ASSISTANT

## 2018-10-25 RX ORDER — ONDANSETRON 4 MG/1
4 TABLET, FILM COATED ORAL EVERY 6 HOURS
Qty: 12 TABLET | Refills: 0 | Status: SHIPPED | OUTPATIENT
Start: 2018-10-25 | End: 2020-06-10

## 2018-10-25 RX ORDER — TRAMADOL HYDROCHLORIDE 50 MG/1
50 TABLET ORAL EVERY 4 HOURS PRN
COMMUNITY
End: 2020-06-10

## 2018-10-25 RX ORDER — POLYETHYLENE GLYCOL 3350 17 G/17G
17 POWDER, FOR SOLUTION ORAL DAILY PRN
Qty: 14 EACH | Refills: 0 | Status: SHIPPED | OUTPATIENT
Start: 2018-10-25 | End: 2020-06-10

## 2018-10-25 NOTE — ED PROVIDER NOTES
History  Chief Complaint   Patient presents with    Nausea     Patient presents to the ER stating she took 8 tramadol last night for back pain, now has felt dizzy and nausea all day  this was stated through the language line using Mozambican  25 yo female w/ hx of chronic pain presents to the Emergency Department for evaluation of nausea and constipation  She states that her back was bothering her last night and she took 8 tramadol 50 mg tablets at midnight  She did not vomit  She states that she does not know why she took this many pills, she was just worried about her pain  Prior to Admission Medications   Prescriptions Last Dose Informant Patient Reported? Taking?   traMADol (ULTRAM) 50 mg tablet   Yes Yes   Sig: Take 50 mg by mouth every 4 (four) hours as needed for moderate pain      Facility-Administered Medications Last Administration Doses Remaining   MedroxyPROGESTERone Acetate ABEL 150 mg 9/18/2018  9:49 AM 3          Past Medical History:   Diagnosis Date    Chronic pain        Past Surgical History:   Procedure Laterality Date    VAGINAL DELIVERY         Family History   Problem Relation Age of Onset    No Known Problems Mother     No Known Problems Father      I have reviewed and agree with the history as documented  Social History   Substance Use Topics    Smoking status: Never Smoker    Smokeless tobacco: Never Used    Alcohol use No        Review of Systems   Constitutional: Negative for chills and fever  Respiratory: Negative for shortness of breath  Gastrointestinal: Positive for constipation and nausea  Negative for abdominal pain and vomiting  Genitourinary: Positive for difficulty urinating  Musculoskeletal: Positive for back pain (Chronic)  Skin: Negative for rash  Allergic/Immunologic: Negative for immunocompromised state  Neurological: Negative for dizziness, weakness, light-headedness and numbness  Hematological: Does not bruise/bleed easily  Physical Exam  Physical Exam   Constitutional: She is oriented to person, place, and time  She appears well-developed and well-nourished  No distress  HENT:   Head: Normocephalic and atraumatic  Eyes: Pupils are equal, round, and reactive to light  No scleral icterus  Cardiovascular: Normal rate and regular rhythm  Exam reveals no gallop and no friction rub  No murmur heard  Pulmonary/Chest: No respiratory distress  She has no wheezes  She has no rales  Abdominal: Soft  Bowel sounds are normal  She exhibits no distension and no mass  There is no tenderness  There is no guarding  Neurological: She is alert and oriented to person, place, and time  Skin: Skin is warm and dry  Capillary refill takes less than 2 seconds  She is not diaphoretic  Psychiatric: She has a normal mood and affect  Her behavior is normal    Vitals reviewed        Vital Signs  ED Triage Vitals [10/25/18 1715]   Temperature Pulse Respirations Blood Pressure SpO2   (!) 96 8 °F (36 °C) 98 20 138/88 96 %      Temp Source Heart Rate Source Patient Position - Orthostatic VS BP Location FiO2 (%)   Temporal Monitor Lying Right arm --      Pain Score       --           Vitals:    10/25/18 1715   BP: 138/88   Pulse: 98   Patient Position - Orthostatic VS: Lying       Visual Acuity      ED Medications  Medications - No data to display    Diagnostic Studies  Results Reviewed     Procedure Component Value Units Date/Time    POCT pregnancy, urine [[id number]]  (Normal) Resulted:  10/25/18 1800    Lab Status:  Final result Updated:  10/25/18 1805     EXT PREG TEST UR (Ref: Negative) Negative    POCT urinalysis dipstick [[id number]]  (Normal) Resulted:  10/25/18 1800    Lab Status:  Final result Specimen:  Urine Updated:  10/25/18 1805     Color, UA yellow     Clarity, UA clear     EXT Glucose, UA (Ref: Negative) Negative     EXT Bilirubin, UA (Ref: Negative) Negative     Ketones, UA (Ref: Negative) Negative     Spec Grav, UA (Ref:1 003-1 030) 1 015     Blood, UA (Ref: Negative) Negative     EXT pH, UA (Ref: 4 5-8 0) 6     EXT Protein, UA (Ref: Negative) Negative     Urobilinogen, UA (Ref: 0 2- 1 0) 0 2      Leukocytes, UA (Ref: Negative) Negative     Nitrite, UA (Ref: Negative) Negative                 No orders to display              Procedures  Procedures       Phone Contacts  ED Phone Contact    ED Course                               MDM  Number of Diagnoses or Management Options  Constipation due to opioid therapy: new and does not require workup  Overdose opiate, accidental or unintentional, initial encounter [facility]): new and does not require workup  Diagnosis management comments: 22-year-old female presents emergency department with a concern for nausea and constipation after excessive dose of opiates  She does not display an opiate toxidrome at this time  She was able to freely urinates without signs of retention while in the emergency department, urinalysis negative, negative HCG  She is educated regarding the appropriate dose of opiate pain medications, provided prescription for Zofran as well as MiraLax for symptomatic relief         Amount and/or Complexity of Data Reviewed  Clinical lab tests: ordered and reviewed  Tests in the medicine section of CPT®: ordered and reviewed  Review and summarize past medical records: yes      CritCare Time    Disposition  Final diagnoses:   Overdose opiate, accidental or unintentional, initial encounter ([facility] [address] )   Constipation due to opioid therapy     Time reflects when diagnosis was documented in both MDM as applicable and the Disposition within this note     Time User Action Codes Description Comment    10/25/2018  6:01 PM Lizet, [address] Overdose opiate, accidental or unintentional, initial encounter ([facility] [address] )     10/25/2018  6:01 PM Humberto Carrizales Add [K59 03,  T40 2X5A] Constipation due to opioid therapy       ED Disposition     ED Disposition Condition Comment    Discharge  Deborah Lyn Wyatt discharge to home/self care  Condition at discharge: Good        Follow-up Information     Follow up With Specialties Details Why Contact Info Additional Information    [address] [facility] Emergency Medicine  If symptoms worsen [facility]  [address] [place] [address] ED, [place] [address], [place], [place], [address]          Discharge Medication List as of 10/25/2018  6:02 PM      START taking these medications    Details   ondansetron (ZOFRAN) 4 mg tablet Take 1 tablet (4 mg total) by mouth every 6 (six) hours, Starting Thu 10/25/2018, Print      polyethylene glycol (MIRALAX) 17 g packet Take 17 g by mouth daily as needed (constipation), Starting Thu 10/25/2018, Print         CONTINUE these medications which have NOT CHANGED    Details   traMADol (ULTRAM) 50 mg tablet Take 50 mg by mouth every 4 (four) hours as needed for moderate pain, Historical Med           No discharge procedures on file      ED Provider  Electronically Signed by           Karen Urena PA-C  10/25/18 8721

## 2018-10-25 NOTE — DISCHARGE INSTRUCTIONS
Estreñimiento   LO QUE NECESITA SABER:   El estreñimiento sucede cuando usted tiene evacuaciones intestinales duras y secas o pasa más tiempo del normal entre ellas  INSTRUCCIONES SOBRE EL ROXANA HOSPITALARIA:   Regrese a la oren de emergencias si:   · Usted tiene chelsi en marie evacuaciones intestinales  · Usted tiene fiebre y dolor abdominal con el estreñimiento  Pregúntele a marie [place] vitaminas y minerales son adecuados para usted  · El estreñimiento empeora  · Usted comienza a vomitar  · Usted tiene preguntas o inquietudes acerca de marie condición o cuidado  Medicamentos:   · Un medicamento o un suplemento de fibra  podría ayudarle a ablandar las evacuaciones intestinales  Un laxante podría aflojar o ayudar a relajar sheila intestinos para que pueda tener deb evacuación intestinal  También es probable que le den medicamentos para aumentar el líquido en sheila intestinos  El líquido puede llegar a movilizar las evacuaciones intestinales a través de sheila intestinos  · [place] sheila medicamentos julia se le haya indicado  Consulte con marie médico si usted linda que marie medicamento no le está ayudando o si presenta efectos secundarios  Infórmele si es alérgico a algún medicamento  Mantenga deb lista actualizada de los [place], las vitaminas y los productos herbales que dino  Incluya los siguientes datos de los medicamentos: cantidad, frecuencia y motivo de administración  Traiga con usted la lista o los envases de la píldoras a sheila citas de seguimiento  Lleve la lista de los medicamentos con usted en ashlee de deb emergencia  Controle marie estreñimiento:   · [place] líquidos julia se le haya indicado  Puede que necesite beber más líquidos para ayudar a ablandar las evacuaciones y evacuar el intestino  Pregunte cuánto líquido debe chester cada día y cuáles líquidos son los más adecuados para usted  · Coma alimentos altos en fibras    [place] podría ayudar a disminuir el estreñimiento al aumentar el volumen de las evacuaciones intestinales  Alimentos altos en [address] frutas, vegetales, panes y cereales integrales, y frijoles  Marie médico o dietista puede ayudarle a crear un plan alimenticio con alto contenido de [place]  · Realice actividad física con regularidad  La actividad física regular puede ayudarle a estimular sheila intestinos  Pregunte cuáles son los ejercicios más adecuados para usted  · Programe deb hora cada día para tener deb evacuación intestinal   [place] podría ayudar a marie cuerpo a acostumbrarse a tener evacuaciones intestinales regulares  Inclínese hacia adelante mientras está sentado en el inodoro para facilitar la expulsión de la evacuación intestinal  Siéntese en el inodoro al menos por 10 minutos, incluso si usted no tiene deb evacuación intestinal   Acuda a sheila consultas de control con marie médico según le indicaron  Anote sheila preguntas para que se acuerde de hacerlas mauri sheila visitas  © 2017 [employer]  Information is for End User's use only and may not be sold, redistributed or otherwise used for commercial purposes  All illustrations and images included in CareNotes® are the copyrighted property of A D A M , Inc  or Thor Castillo  Esta información es sólo para uso en educación  Marie intención no es darle un consejo médico sobre enfermedades o tratamientos  Colsulte con marie Gala Primer farmacéutico antes de seguir cualquier régimen médico para saber si es seguro y efectivo para usted

## 2018-11-13 DIAGNOSIS — Z78.9 NEED FOR FOLLOW-UP BY SOCIAL WORKER: Primary | ICD-10-CM

## 2018-11-19 ENCOUNTER — NURSE TRIAGE (OUTPATIENT)
Dept: PHYSICAL THERAPY | Facility: OTHER | Age: 23
End: 2018-11-19

## 2018-11-19 DIAGNOSIS — G89.29 CHRONIC BILATERAL LOW BACK PAIN WITHOUT SCIATICA: Primary | ICD-10-CM

## 2018-11-19 DIAGNOSIS — M54.50 CHRONIC BILATERAL LOW BACK PAIN WITHOUT SCIATICA: Primary | ICD-10-CM

## 2018-11-19 NOTE — TELEPHONE ENCOUNTER
Reason for Disposition   Is this a chronic condition? Background - Initial Assessment  Clinical complaint: low back pain, bilaterally  Sometimes it radiate to left flank area and front of abdomen  Date of onset: 4 months ago, started 2 yrs ago  Mechanism of injury: after her baby was born    Previous Treatment - Previous Treatment  Previous evaluation: ED visit 3 months ago  Current provider: PCP in [facility] [address]: none  Previous treatment: none    Protocols used:  JODY COMPREHENSIVE SPINE PROGRAM PROTOCOL    Layman Cheadle, from [facility] calling to make appt for pt  Pt is Maltese speaking and Layman Cheadle is translating  Pt is present and actively participating in the call  RN explained that Comprehensive Spine program is physical therapy based program in which patients are scheduled for a consultation  The therapists may offer treatments such as exercises, stretches, posturing, massage or traction  RN recommended PTx and pt agreed  RN transferred pt to PTx  for appt

## 2018-11-20 ENCOUNTER — PATIENT OUTREACH (OUTPATIENT)
Dept: OBGYN CLINIC | Facility: HOSPITAL | Age: 23
End: 2018-11-20

## 2018-11-20 NOTE — PROGRESS NOTES
ROSHAN MET WITH PATIENT AND  ON THEIR REQUEST  PT IN NEED OF ORTHOPEDIC EVALUATION   ROSHAN ASSISTED WITH REFERRAL TO  ORTHOPEDIC FOR 11/21/18 at 2:30 pm

## 2018-11-21 ENCOUNTER — EVALUATION (OUTPATIENT)
Dept: PHYSICAL THERAPY | Facility: CLINIC | Age: 23
End: 2018-11-21
Payer: COMMERCIAL

## 2018-11-21 DIAGNOSIS — M54.50 CHRONIC BILATERAL LOW BACK PAIN WITHOUT SCIATICA: ICD-10-CM

## 2018-11-21 DIAGNOSIS — G89.29 CHRONIC BILATERAL LOW BACK PAIN WITHOUT SCIATICA: ICD-10-CM

## 2018-11-21 PROCEDURE — G8978 MOBILITY CURRENT STATUS: HCPCS | Performed by: PHYSICAL THERAPIST

## 2018-11-21 PROCEDURE — G8979 MOBILITY GOAL STATUS: HCPCS | Performed by: PHYSICAL THERAPIST

## 2018-11-23 PROCEDURE — 97162 PT EVAL MOD COMPLEX 30 MIN: CPT | Performed by: PHYSICAL THERAPIST

## 2018-11-23 NOTE — PROGRESS NOTES
Today's date: 2018  Patient name: Gerard Monreal  : 1995  MRN: [id number]  Referring provider: Carlita Ann MD  Dx:   Encounter Diagnosis     ICD-10-CM    1  Chronic bilateral low back pain without sciatica M54 5 Ambulatory referral to PT spine    G89 29                   Assessment/Plan    Subjective Evaluation    History of Present Illness  Pt does not speak [place]  Information translated via Pt's friend who attended visit  Mechanism of injury: This pt presents with chronic LBP  She reports through an  that about 5 mo  Ago she bent over to pick something up, felt sharp pain and had difficulty straightening up  Pain is in bilat low back, she denies radiating symptoms  Had an injection about 3 mo  Ago w/o signif relief    Pain  At worst pain ratin  Quality: grinding          Objective     R shoulder lower w/o signif lateral shift  Norm lordosis  Gait is normal, but holds back   Trunk flexion: 75% with pain  Ext: 50% with pain  VASILIY: increased pain  RFIS: increased pain    Prone instability (-)  SLR + bilat at 90 deg  Poor abdominal control noted with supine leg lowreing    RFIL: NE  Prone prop relieved pain  REL:   Generalized TTP low back

## 2018-11-23 NOTE — PROGRESS NOTES
PT Evaluation     Today's date: 18  Patient name: Jesus Young  : 1995  MRN: [id number]  Referring provider: Silverio Monaco MD  Dx:   Encounter Diagnosis     ICD-10-CM    1  Chronic bilateral low back pain without sciatica M54 5 Ambulatory referral to PT spine    G89 29        Subjective Evaluation    History of Present Illness  Pt does not speak [place]  Information translated via Pt's friend who attended visit  Mechanism of injury: This pt presents with chronic LBP  She reports through an  that about 5 mo  Ago she bent over to pick something up, felt sharp pain and had difficulty straightening up  Pain is in bilat low back, she denies radiating symptoms  Had an injection about 3 mo  Ago w/o signif relief  Pain  At worst pain ratin  Quality: grinding          Objective     R shoulder lower w/o signif lateral shift  Norm lordosis  Gait is normal, but holds back   Trunk flexion: 75% with pain  Ext: 50% with pain  VASILIY: increased pain  RFIS: increased pain    Prone instability (-)  SLR + bilat at 90 deg  Poor abdominal control noted with supine leg lowreing    RFIL: NE  Prone prop relieved pain  REL:   Generalized TTP low back                       Assessment  Assessment details: Patient is a 26 y o  female who presents to [place] [address] program with the above listed impairments  Patients fits into a stabilization treatment category due to probable mechanical dysfunction and would benefit from skilled PT services to address these impairments and to maximize function  No directional preference noted  Impairments: abnormal muscle firing, abnormal or restricted ROM, activity intolerance, impaired physical strength, lacks appropriate home exercise program, pain with function, poor posture  and poor body mechanics  Understanding of Dx/Px/POC: good   Prognosis: good    Plan  Plan details: F/u in 2 wks to assess HEP, then progress as tolerated    Increase frequency of visits prn    Patient would benefit from: skilled physical therapy  Planned modality interventions: cryotherapy, thermotherapy: hydrocollator packs and TENS  Planned therapy interventions: home exercise program, graded exercise, functional ROM exercises, flexibility, body mechanics training, postural training, patient education, therapeutic activities, therapeutic exercise, manual therapy, joint mobilization and neuromuscular re-education  Frequency: 1x week  Duration in weeks: 12  Treatment plan discussed with: patient and PCP        Subjective    Objective

## 2018-12-06 DIAGNOSIS — Z30.42 ENCOUNTER FOR SURVEILLANCE OF INJECTABLE CONTRACEPTIVE: Primary | ICD-10-CM

## 2018-12-06 RX ORDER — MEDROXYPROGESTERONE ACETATE 150 MG/ML
150 INJECTION, SUSPENSION INTRAMUSCULAR
Qty: 1 ML | Refills: 3 | Status: SHIPPED | OUTPATIENT
Start: 2018-12-06 | End: 2020-04-08 | Stop reason: SDUPTHER

## 2018-12-11 ENCOUNTER — CLINICAL SUPPORT (OUTPATIENT)
Dept: OBGYN CLINIC | Facility: HOSPITAL | Age: 23
End: 2018-12-11
Payer: COMMERCIAL

## 2018-12-11 DIAGNOSIS — Z30.42 ENCOUNTER FOR DEPO-PROVERA CONTRACEPTION: ICD-10-CM

## 2018-12-11 PROCEDURE — 96372 THER/PROPH/DIAG INJ SC/IM: CPT

## 2018-12-11 RX ADMIN — MEDROXYPROGESTERONE ACETATE 150 MG: 150 INJECTION, SUSPENSION INTRAMUSCULAR at 11:02

## 2018-12-11 NOTE — PROGRESS NOTES
Depo-Provera      [x]   Patient provided box    Syringe    Last  Annual/Pap Date: 05/22/2018  Jacki Vásquez Last Depo date: 09/18/18   Side effects: None   HCG; if applicable: N/a   Given by: Lori Travis RN   Site: Left deltoid   Next appt  due: 02/26/2019   Calcium supplement daily teaching, condoms for 2 weeks following first injection dose  With c/o bilat breast soreness for the last 3 days  Informed pt that it may be r/t hormonal fluctuations r/t her menstrual cycle and Depo  Instructed pt to call us if symptoms persist after the next few days

## 2018-12-12 ENCOUNTER — OFFICE VISIT (OUTPATIENT)
Dept: PHYSICAL THERAPY | Facility: CLINIC | Age: 23
End: 2018-12-12
Payer: COMMERCIAL

## 2018-12-12 DIAGNOSIS — M54.50 CHRONIC BILATERAL LOW BACK PAIN WITHOUT SCIATICA: Primary | ICD-10-CM

## 2018-12-12 DIAGNOSIS — G89.29 CHRONIC BILATERAL LOW BACK PAIN WITHOUT SCIATICA: Primary | ICD-10-CM

## 2018-12-12 PROCEDURE — 97110 THERAPEUTIC EXERCISES: CPT | Performed by: PHYSICAL THERAPIST

## 2018-12-12 NOTE — PROGRESS NOTES
Daily Note     Today's date: 2018  Patient name: Maribeth Robles  : 1995  MRN: [id number]  Referring provider: Latisha Khan MD  Dx:   Encounter Diagnosis     ICD-10-CM    1  Chronic bilateral low back pain without sciatica M54 5     G89 29                   Subjective: Pts  reports that she feels about the same  She has not been consistent with HEP because she usually does not do it when her back hurts      Objective: See treatment diary below  Precautions: none    Daily Treatment Diary       Manuals                                                                Exercise Diary              Reviewed HEP  25'           Supine ham stretch  30" ea                                                                                                                                                                                                                                                                               Modalities                                         SLR (-)  Mild hamstring tightness      Assessment: Pt needed VC's for proper ex technique  Pt needs to demonstrate consistency with HEP  If she is consistent with HEP over the next few weeks and does not experience improvement, she may benefit from pain management or women's health PT  Plan: f/u in 3-4 wks

## 2019-02-26 ENCOUNTER — CLINICAL SUPPORT (OUTPATIENT)
Dept: OBGYN CLINIC | Facility: CLINIC | Age: 24
End: 2019-02-26

## 2019-02-26 DIAGNOSIS — Z30.42 ENCOUNTER FOR DEPO-PROVERA CONTRACEPTION: Primary | ICD-10-CM

## 2019-02-26 PROCEDURE — 96372 THER/PROPH/DIAG INJ SC/IM: CPT

## 2019-02-26 RX ADMIN — MEDROXYPROGESTERONE ACETATE 150 MG: 150 INJECTION, SUSPENSION INTRAMUSCULAR at 13:31

## 2019-02-26 NOTE — PROGRESS NOTES
Depo-Provera      [x]   Patient provided box     Vial or syringe    Last  Annual/Pap Date: 05/2018  Aubrie Rueda Last Depo date: 12/11/2018   Side effects:    HCG; if applicable: n/a   Given by: Anna Zimmerman RN      Site: Left deltoid   Next appt  due: May 22, 2019  May 14 - 28   Calcium supplement daily teaching, condoms for 2 weeks following first injection dose

## 2019-05-22 ENCOUNTER — OFFICE VISIT (OUTPATIENT)
Dept: OBGYN CLINIC | Facility: CLINIC | Age: 24
End: 2019-05-22

## 2019-05-22 VITALS
HEART RATE: 91 BPM | HEIGHT: 59 IN | BODY MASS INDEX: 22.58 KG/M2 | DIASTOLIC BLOOD PRESSURE: 73 MMHG | SYSTOLIC BLOOD PRESSURE: 113 MMHG | WEIGHT: 112 LBS

## 2019-05-22 DIAGNOSIS — Z01.419 WELL WOMAN EXAM: Primary | ICD-10-CM

## 2019-05-22 DIAGNOSIS — Z11.3 SCREENING FOR STDS (SEXUALLY TRANSMITTED DISEASES): ICD-10-CM

## 2019-05-22 DIAGNOSIS — Z30.42 DEPO-PROVERA CONTRACEPTIVE STATUS: ICD-10-CM

## 2019-05-22 PROCEDURE — 87591 N.GONORRHOEAE DNA AMP PROB: CPT | Performed by: OBSTETRICS & GYNECOLOGY

## 2019-05-22 PROCEDURE — 87491 CHLMYD TRACH DNA AMP PROBE: CPT | Performed by: OBSTETRICS & GYNECOLOGY

## 2019-05-22 PROCEDURE — 96372 THER/PROPH/DIAG INJ SC/IM: CPT | Performed by: OBSTETRICS & GYNECOLOGY

## 2019-05-22 PROCEDURE — 99395 PREV VISIT EST AGE 18-39: CPT | Performed by: OBSTETRICS & GYNECOLOGY

## 2019-05-22 RX ADMIN — MEDROXYPROGESTERONE ACETATE 150 MG: 150 INJECTION, SUSPENSION INTRAMUSCULAR at 15:53

## 2019-05-23 LAB
C TRACH DNA SPEC QL NAA+PROBE: NEGATIVE
N GONORRHOEA DNA SPEC QL NAA+PROBE: NEGATIVE

## 2019-08-14 ENCOUNTER — CLINICAL SUPPORT (OUTPATIENT)
Dept: OBGYN CLINIC | Facility: CLINIC | Age: 24
End: 2019-08-14

## 2019-08-14 DIAGNOSIS — Z30.42 ENCOUNTER FOR MANAGEMENT AND INJECTION OF DEPO-PROVERA: Primary | ICD-10-CM

## 2019-08-14 PROCEDURE — 96372 THER/PROPH/DIAG INJ SC/IM: CPT

## 2019-08-14 RX ORDER — MEDROXYPROGESTERONE ACETATE 150 MG/ML
150 INJECTION, SUSPENSION INTRAMUSCULAR
Status: DISCONTINUED | OUTPATIENT
Start: 2019-08-14 | End: 2021-08-17

## 2019-08-14 RX ADMIN — MEDROXYPROGESTERONE ACETATE 150 MG: 150 INJECTION, SUSPENSION INTRAMUSCULAR at 15:23

## 2019-08-14 NOTE — PROGRESS NOTES
Depo-Provera      [x]   Patient provided box / Yes   Vial & Syringe    Last  Annual/Pap Date: May - 2020   Last Depo date: 5/22/19   Side effects: None reported   HCG: N/A   Given by: K Reyes   Site: RAÚL     Calcium supplement daily teaching, condoms for 2 weeks following first injection dose

## 2019-09-24 ENCOUNTER — HOSPITAL ENCOUNTER (EMERGENCY)
Facility: HOSPITAL | Age: 24
Discharge: HOME | End: 2019-09-24
Attending: EMERGENCY MEDICINE | Admitting: EMERGENCY MEDICINE
Payer: COMMERCIAL

## 2019-09-24 VITALS
BODY MASS INDEX: 22.58 KG/M2 | RESPIRATION RATE: 18 BRPM | HEIGHT: 59 IN | SYSTOLIC BLOOD PRESSURE: 128 MMHG | DIASTOLIC BLOOD PRESSURE: 77 MMHG | WEIGHT: 112 LBS | TEMPERATURE: 97.7 F | HEART RATE: 82 BPM | OXYGEN SATURATION: 97 %

## 2019-09-24 DIAGNOSIS — J02.9 PHARYNGITIS: Primary | ICD-10-CM

## 2019-09-24 PROCEDURE — 99282 EMERGENCY DEPT VISIT SF MDM: CPT

## 2019-09-24 PROCEDURE — 99284 EMERGENCY DEPT VISIT MOD MDM: CPT | Performed by: EMERGENCY MEDICINE

## 2019-09-24 PROCEDURE — 96372 THER/PROPH/DIAG INJ SC/IM: CPT

## 2019-09-24 RX ORDER — KETOROLAC TROMETHAMINE 30 MG/ML
30 INJECTION, SOLUTION INTRAMUSCULAR; INTRAVENOUS ONCE
Status: COMPLETED | OUTPATIENT
Start: 2019-09-24 | End: 2019-09-24

## 2019-09-24 RX ADMIN — PENICILLIN G BENZATHINE 1.2 MILLION UNITS: 1200000 INJECTION, SUSPENSION INTRAMUSCULAR at 08:16

## 2019-09-24 RX ADMIN — KETOROLAC TROMETHAMINE 30 MG: 30 INJECTION, SOLUTION INTRAMUSCULAR at 08:16

## 2019-09-24 NOTE — ED PROVIDER NOTES
History  Chief Complaint   Patient presents with    Sore Throat     This is a 82-year-old female who presents with her spouse complaining sore throat since yesterday minimal cough trouble swallowing no fever no vomiting  She is on the Depo-Provera shot no chance of pregnancy at this time   requesting IM injections      History provided by:  Patient and spouse  Medical Problem   Location:  Throat  Quality:  Pain  Severity:  Severe  Onset quality:  Gradual  Duration:  1 day  Timing:  Constant  Progression:  Unchanged  Chronicity:  New  Context:  Severe sore throat  Relieved by:  Nothing  Associated symptoms: sore throat    Associated symptoms: no fever  Cough: Minimal         Prior to Admission Medications   Prescriptions Last Dose Informant Patient Reported? Taking? medroxyPROGESTERone (DEPO-PROVERA) 150 mg/mL injection   No No   Sig: Inject 1 mL (150 mg total) into a muscle every 3 (three) months   ondansetron (ZOFRAN) 4 mg tablet   No No   Sig: Take 1 tablet (4 mg total) by mouth every 6 (six) hours   polyethylene glycol (MIRALAX) 17 g packet   No No   Sig: Take 17 g by mouth daily as needed (constipation)   Patient not taking: Reported on 5/22/2019   traMADol (ULTRAM) 50 mg tablet   Yes No   Sig: Take 50 mg by mouth every 4 (four) hours as needed for moderate pain      Facility-Administered Medications Last Administration Doses Remaining   medroxyPROGESTERone (DEPO-PROVERA) IM injection 150 mg 8/14/2019  3:23 PM           Past Medical History:   Diagnosis Date    Chronic pain        Past Surgical History:   Procedure Laterality Date    VAGINAL DELIVERY         Family History   Problem Relation Age of Onset    No Known Problems Mother     No Known Problems Father     No Known Problems Sister     No Known Problems Brother     No Known Problems Son     No Known Problems Sister     No Known Problems Sister      I have reviewed and agree with the history as documented      Social History     Tobacco Use    Smoking status: Never Smoker    Smokeless tobacco: Never Used   Substance Use Topics    Alcohol use: No    Drug use: No        Review of Systems   Constitutional: Negative for fever  HENT: Positive for sore throat  Respiratory: Cough: Minimal     All other systems reviewed and are negative  Physical Exam  Physical Exam   Constitutional: She appears well-developed and well-nourished  No distress  HENT:   Head: Normocephalic and atraumatic  Right Ear: External ear normal    Left Ear: External ear normal    Nose: Nose normal    Pharyngeal erythema without exudate no tonsillar protrusion no uvular deviation or sign of abscess airway patent without stridor   Eyes: Pupils are equal, round, and reactive to light  EOM are normal  Right eye exhibits no discharge  Left eye exhibits no discharge  No scleral icterus  Neck: Neck supple  No JVD present  No tracheal deviation present  Cardiovascular: Normal rate, regular rhythm and intact distal pulses  Exam reveals no gallop and no friction rub  No murmur heard  Pulmonary/Chest: Effort normal and breath sounds normal  No stridor  No respiratory distress  She has no wheezes  She has no rales  Abdominal: Soft  Bowel sounds are normal  She exhibits no distension and no mass  There is no tenderness  There is no rebound and no guarding  Musculoskeletal: Normal range of motion  She exhibits no edema, tenderness or deformity  Lymphadenopathy:     She has cervical adenopathy  Neurological: She is alert  No cranial nerve deficit or sensory deficit  She exhibits normal muscle tone  Coordination normal    Skin: Skin is warm and dry  No rash noted  She is not diaphoretic  Psychiatric: She has a normal mood and affect  Her behavior is normal    Nursing note and vitals reviewed        Vital Signs  ED Triage Vitals [09/24/19 0758]   Temperature Pulse Respirations Blood Pressure SpO2   97 7 °F (36 5 °C) 82 18 128/77 97 %      Temp src Heart Rate Source Patient Position - Orthostatic VS BP Location FiO2 (%)   -- -- Sitting Right arm --      Pain Score       Worst Possible Pain           Vitals:    09/24/19 0758   BP: 128/77   Pulse: 82   Patient Position - Orthostatic VS: Sitting         Visual Acuity      ED Medications  Medications   penicillin G benzathine (BICILLIN L-A) intramuscular injection 1 2 Million Units (has no administration in time range)   ketorolac (TORADOL) injection 30 mg (has no administration in time range)       Diagnostic Studies  Results Reviewed     None                 No orders to display              Procedures  Procedures       ED Course                               MDM    Disposition  Final diagnoses:   Pharyngitis     Time reflects when diagnosis was documented in both MDM as applicable and the Disposition within this note     Time User Action Codes Description Comment    9/24/2019  8:13 AM Dahlia Ballesteros [J02 9] Pharyngitis       ED Disposition     ED Disposition Condition Date/Time Comment    Discharge Stable Tue Sep 24, 2019  8:13 AM Pancho Hurtado discharge to home/self care  Follow-up Information     Follow up With Specialties Details Why [address] 82  In 1 week Primary care physician, As needed [phone]            Patient's Medications   Discharge Prescriptions    No medications on file     No discharge procedures on file      ED Provider  Electronically Signed by           Merna Sherwood DO  09/24/19 8227

## 2019-10-30 ENCOUNTER — CLINICAL SUPPORT (OUTPATIENT)
Dept: OBGYN CLINIC | Facility: CLINIC | Age: 24
End: 2019-10-30

## 2019-10-30 DIAGNOSIS — Z30.42 ENCOUNTER FOR SURVEILLANCE OF INJECTABLE CONTRACEPTIVE: ICD-10-CM

## 2019-10-30 PROCEDURE — 96372 THER/PROPH/DIAG INJ SC/IM: CPT

## 2019-10-30 RX ADMIN — MEDROXYPROGESTERONE ACETATE 150 MG: 150 INJECTION, SUSPENSION INTRAMUSCULAR at 15:24

## 2019-10-30 NOTE — PROGRESS NOTES
Depo-Provera      [x]   Patient provided box yes   o 1 Refills remain   Last  Annual Date: 05/22/2019  Clemens Last Depo date: 08/14/2019   Side effects: no   EV   Site: left deltoid     Calcium supplement daily teaching, condoms for 2 weeks following first injection dose

## 2019-10-30 NOTE — PATIENT INSTRUCTIONS
Medroxiprogesterona (Por inyección)   Briana un embarazo  También sirve para tratar la endometriosis y se [place] con otros medicamentos para ayudar a [employer] de cáncer, incluyendo el cáncer de [place] o de [place]  Anna(s) : Depo-Provera, Depo-Provera Contraceptive, Depo-SubQ Provera 104   Existen muchas otras marcas de [place]  Keyanna medicamento no debe ser usado cuando:   Keyanna medicamento no es adecuado para todas las personas  No lo reciba si usted ha tenido deb reacción alérgica a la medroxiprogesterona o si usted tiene antecedentes de cáncer de seno o coágulos de chelsi (incluyendo ataque al corazón o un derrame cerebral)  En la [employer], no debe usar keyanna medicamento mauri el [facility]  Forma de usar keyanna medicamento:   Inyectable  · Willard Men enfermera u otro médico le administrará keyanna medicamento  Keyanna medicamento se administra julia deb inyección en un músculo o debajo de la piel  · Donato horario exacto del tratamiento depende de la razón por la cual usted está usando keyanna medicamento  Donato médico le explicará donato horario personal    ¨ Para tratamiento de los síntomas de cáncer, usted puede comenzar con deb inyección deb vez por semana  Es posible que necesite menos inyecciones a medida que avance donato tratamiento  ¨ Para control de natalidad o endometriosis, usted necesitará deb inyección cada 3 meses (13 semanas)  Ellyn y siga las instrucciones para el paciente que vienen con el medicamento  Hable con donato médico o farmacéutico si tiene alguna pregunta  ¨ Puede que necesite recibir donato primera [employer] primeros 5 días de donato periodo menstrual normal, para asegurarse de que no está embarazada  Si usted acaba de tener un bebé, puede recibir deb inyección 5 días después del parto si no está dando de lactar o 6 semanas después del parto si está dando de lactar  · Si olvida deb dosis: Debe recibir deb inyección cada 3 meses si usted quiere evitar el embarazo   Hable con donato médico o farmacéutico si usted no recibe marie medicamento a tiempo, porque usted puede necesitar otra forma de control de natalidad  Medicamentos y [employer] que debe evitar:   Consulte con marie médico o farmacéutico antes de usar cualquier medicamento, incluyendo los que compra sin receta médica, las vitaminas y los productos herbales  · Algunos alimentos y medicamentos pueden afectar la eficacia de medroxiprogesterona  Informe a marie médico si está usando cualquiera de los siguientes:  ¨ Aminoglutetimida, bosentan, carbamazepina, felbamato, griseofulvina, nefazodona, oxcarbazepina, fenobarbital, fenitoína, rifabutina, rifampicina, rifapentina, hierba de [address] Medicamento para el tratamiento de deb infección (incluyendo claritromicina, itraconazol, ketoconazol, telitromicina, voriconazol)  ¨ Medicamentos para tratar el VIH / Nelly Ovidio (incluyendo atazanavir, indinavir, nelfinavir, ritonavir, saquinavir)  Precauciones mauri el uso de joe medicamento:   · Informe a marie médico de inmediato si usted linda que quedó [place]  · Informe a marie médico si usted está dando de lactar o si tiene enfermedad del hígado, enfermedad del riñón, asma, diabetes, enfermedades del corazón, convulsiones, carmelo de lalo por migraña, un trastorno alimenticio, osteoporosis o antecedentes de depresión  Dígale a marie médico si usted fuma  · Joe medicamento puede provocarle los siguientes problemas:  ¨ Coágulos de [place], lo que podría llevar a un derrame cerebral, ataque al corazón u otros problemas graves  ¨ Posible aumento del riesgo de cáncer de seno  ¨ Huesos débiles o delgados, especialmente con el uso a nash plazo  · Usted no debe usar joe medicamento julia método anticonceptivo a [employer]rea [employer] a menos que usted no pueda usar ningún otro método anticonceptivo    · Joe medicamento no la protegerá contra el VIH / Nelly Ovidio u otras enfermedades de transmisión sexual   · Dígale a todo médico o dentista encargado de atenderle que usted [place] usando [employer]  Puede que joe medicamento afecte algunos resultados de [employer]  · Marie médico tendrá que revisar marie progreso y los efectos de joe medicamento mauri sheila citas regulares  [place]umpl[place] sin falta con todas sheila citas médicas  Asista a todas sheila citas  Efectos secundarios que pueden presentarse mauri el uso de joe medicamento:   Consulte inmediatamente con el médico si nota cualquiera de estos efectos secundarios:  · Reacción alérgica: Come[place]ón o ronchas, hinchazón del terra o las lucy, hinchazón u hormigueo en la boca o garganta, opresión en el pecho, dificultad para respirar  · Dolor en el pecho, dificultad para respirar, o toser chelsi  · [employer] o heces pálidas, náuseas, vómitos, falta de apetito, dolor estomacal, coloración amarillenta en la piel u ojos  · Sangrado vaginal abundante o que no para  · Perdida de la visión o visión doble  · Adormecimiento o debilidad en un lado del cuerpo, dolor de lalo repentino o severo, problemas con el habla, la visión o para caminar  · Dolor severo de estómago o calambres  Consulte con el médico si nota los siguientes efectos secundarios menos graves:   · Dolor de lalo  · Períodos mensuales ligeros o falta del periodo, manchado entre períodos  · Nerviosismo o mareos  · Dolor, enrojecimiento, ardor, inflamación, o un bulto bajo la piel donde se aplicó la inyección  · Aumento de peso  Consulte con el médico si nota otros efectos secundarios que linda son causados por joe medicamento  Llame a marie médico para consultarle Don Frias puede notificar sheila efectos secundarios al FDA al [phone]-[phone]  © 2017 [address] Information is for End User's use only and may not be sold, redistributed or otherwise used for commercial purposes  Esta información es sólo para uso en educación  Marie intención no es darle un consejo médico sobre enfermedades o tratamientos   Colsulte con marie Viviane Clipper farmacéutico antes de seguir cualquier régimen médico para saber si es seguro y efectivo para usted

## 2020-01-15 ENCOUNTER — CLINICAL SUPPORT (OUTPATIENT)
Dept: OBGYN CLINIC | Facility: CLINIC | Age: 25
End: 2020-01-15

## 2020-01-15 DIAGNOSIS — Z30.42 ENCOUNTER FOR SURVEILLANCE OF INJECTABLE CONTRACEPTIVE: ICD-10-CM

## 2020-01-15 PROCEDURE — 96372 THER/PROPH/DIAG INJ SC/IM: CPT

## 2020-01-15 RX ADMIN — MEDROXYPROGESTERONE ACETATE 150 MG: 150 INJECTION, SUSPENSION INTRAMUSCULAR at 15:20

## 2020-01-15 NOTE — PROGRESS NOTES
Depo-Provera      [x]   Patient provided box  o 0 Refills remain   Last  Annual Date: 5/22/19  Cirilo Yen Last Depo date: 10/19/19   Side effects: no   HCG: no     Given by: Michael Fishman CMA   Site: left deltoid     Calcium supplement daily teaching, condoms for 2 weeks following first injection dose

## 2020-04-08 ENCOUNTER — CLINICAL SUPPORT (OUTPATIENT)
Dept: OBGYN CLINIC | Facility: CLINIC | Age: 25
End: 2020-04-08

## 2020-04-08 DIAGNOSIS — Z30.42 ENCOUNTER FOR SURVEILLANCE OF INJECTABLE CONTRACEPTIVE: ICD-10-CM

## 2020-04-08 PROCEDURE — 96372 THER/PROPH/DIAG INJ SC/IM: CPT

## 2020-04-08 RX ORDER — MEDROXYPROGESTERONE ACETATE 150 MG/ML
150 INJECTION, SUSPENSION INTRAMUSCULAR
Qty: 1 ML | Refills: 2 | Status: SHIPPED | OUTPATIENT
Start: 2020-04-08 | End: 2020-06-10 | Stop reason: SDUPTHER

## 2020-04-08 RX ADMIN — MEDROXYPROGESTERONE ACETATE 150 MG: 150 INJECTION, SUSPENSION INTRAMUSCULAR at 15:24

## 2020-06-09 ENCOUNTER — TELEPHONE (OUTPATIENT)
Dept: OBGYN CLINIC | Facility: CLINIC | Age: 25
End: 2020-06-09

## 2020-06-10 ENCOUNTER — ANNUAL EXAM (OUTPATIENT)
Dept: OBGYN CLINIC | Facility: CLINIC | Age: 25
End: 2020-06-10

## 2020-06-10 VITALS
HEART RATE: 73 BPM | WEIGHT: 116 LBS | DIASTOLIC BLOOD PRESSURE: 69 MMHG | HEIGHT: 61 IN | BODY MASS INDEX: 21.9 KG/M2 | SYSTOLIC BLOOD PRESSURE: 104 MMHG | TEMPERATURE: 97.5 F

## 2020-06-10 DIAGNOSIS — Z20.2 POSSIBLE EXPOSURE TO STD: ICD-10-CM

## 2020-06-10 DIAGNOSIS — Z30.42 ENCOUNTER FOR SURVEILLANCE OF INJECTABLE CONTRACEPTIVE: ICD-10-CM

## 2020-06-10 DIAGNOSIS — Z23 NEED FOR HPV VACCINATION: ICD-10-CM

## 2020-06-10 DIAGNOSIS — Z01.419 ENCOUNTER FOR GYNECOLOGICAL EXAMINATION WITHOUT ABNORMAL FINDING: Primary | ICD-10-CM

## 2020-06-10 PROCEDURE — 87491 CHLMYD TRACH DNA AMP PROBE: CPT | Performed by: NURSE PRACTITIONER

## 2020-06-10 PROCEDURE — 90651 9VHPV VACCINE 2/3 DOSE IM: CPT | Performed by: NURSE PRACTITIONER

## 2020-06-10 PROCEDURE — 90471 IMMUNIZATION ADMIN: CPT | Performed by: NURSE PRACTITIONER

## 2020-06-10 PROCEDURE — 87591 N.GONORRHOEAE DNA AMP PROB: CPT | Performed by: NURSE PRACTITIONER

## 2020-06-10 PROCEDURE — 99395 PREV VISIT EST AGE 18-39: CPT | Performed by: NURSE PRACTITIONER

## 2020-06-10 RX ORDER — MEDROXYPROGESTERONE ACETATE 150 MG/ML
150 INJECTION, SUSPENSION INTRAMUSCULAR
Qty: 1 ML | Refills: 4 | Status: SHIPPED | OUTPATIENT
Start: 2020-06-10

## 2020-06-11 LAB
C TRACH DNA SPEC QL NAA+PROBE: NEGATIVE
N GONORRHOEA DNA SPEC QL NAA+PROBE: NEGATIVE

## 2020-07-01 ENCOUNTER — TELEPHONE (OUTPATIENT)
Dept: OBGYN CLINIC | Facility: CLINIC | Age: 25
End: 2020-07-01

## 2020-07-02 ENCOUNTER — CLINICAL SUPPORT (OUTPATIENT)
Dept: OBGYN CLINIC | Facility: CLINIC | Age: 25
End: 2020-07-02

## 2020-07-02 DIAGNOSIS — Z30.42 ENCOUNTER FOR DEPO-PROVERA CONTRACEPTION: Primary | ICD-10-CM

## 2020-07-02 PROCEDURE — 96372 THER/PROPH/DIAG INJ SC/IM: CPT

## 2020-07-02 RX ADMIN — MEDROXYPROGESTERONE ACETATE 150 MG: 150 INJECTION, SUSPENSION INTRAMUSCULAR at 10:52

## 2020-07-02 NOTE — PROGRESS NOTES
Depo-Provera      [x]   Patient provided box    o 2 Refills remain   Last  Annual Date: 06/10/2020  [address] [place] [address] Last Depo date: 04/08/2020   Side effects: no   HCG: no   Given by: Dean Martinez RN   Site: Left deltoid   

## 2020-09-23 ENCOUNTER — TELEPHONE (OUTPATIENT)
Dept: OBGYN CLINIC | Facility: CLINIC | Age: 25
End: 2020-09-23

## 2020-09-24 ENCOUNTER — CLINICAL SUPPORT (OUTPATIENT)
Dept: OBGYN CLINIC | Facility: CLINIC | Age: 25
End: 2020-09-24

## 2020-09-24 DIAGNOSIS — Z30.42 ENCOUNTER FOR SURVEILLANCE OF INJECTABLE CONTRACEPTIVE: ICD-10-CM

## 2020-09-24 PROCEDURE — 96372 THER/PROPH/DIAG INJ SC/IM: CPT

## 2020-09-24 RX ADMIN — MEDROXYPROGESTERONE ACETATE 150 MG: 150 INJECTION, SUSPENSION INTRAMUSCULAR at 16:12

## 2020-09-24 NOTE — PROGRESS NOTES
Depo-Provera      [x]   Patient provided box    o 1 Refills remain   Last  Annual Date: 06/10/2020  Jacki Vásquez Last Depo date: 07/02/2020   Side effects: no   HCG: no  o if applicable: negative   Given by: Haroon Phillips RN   Site: Left deltoid

## 2020-10-12 ENCOUNTER — CLINICAL SUPPORT (OUTPATIENT)
Dept: OBGYN CLINIC | Facility: CLINIC | Age: 25
End: 2020-10-12

## 2020-10-12 VITALS — TEMPERATURE: 97.5 F

## 2020-10-12 DIAGNOSIS — Z23 NEED FOR HPV VACCINATION: Primary | ICD-10-CM

## 2020-10-12 PROCEDURE — 90471 IMMUNIZATION ADMIN: CPT

## 2020-10-12 PROCEDURE — 90651 9VHPV VACCINE 2/3 DOSE IM: CPT

## 2020-12-18 ENCOUNTER — TELEPHONE (OUTPATIENT)
Dept: OBGYN CLINIC | Facility: CLINIC | Age: 25
End: 2020-12-18

## 2020-12-21 ENCOUNTER — CLINICAL SUPPORT (OUTPATIENT)
Dept: OBGYN CLINIC | Facility: CLINIC | Age: 25
End: 2020-12-21

## 2020-12-21 DIAGNOSIS — Z30.42 ENCOUNTER FOR SURVEILLANCE OF INJECTABLE CONTRACEPTIVE: Primary | ICD-10-CM

## 2020-12-21 PROCEDURE — 96372 THER/PROPH/DIAG INJ SC/IM: CPT

## 2020-12-21 RX ADMIN — MEDROXYPROGESTERONE ACETATE 150 MG: 150 INJECTION, SUSPENSION INTRAMUSCULAR at 13:22

## 2021-01-11 ENCOUNTER — OFFICE VISIT (OUTPATIENT)
Dept: INTERNAL MEDICINE CLINIC | Facility: CLINIC | Age: 26
End: 2021-01-11

## 2021-01-11 VITALS
OXYGEN SATURATION: 99 % | SYSTOLIC BLOOD PRESSURE: 134 MMHG | DIASTOLIC BLOOD PRESSURE: 79 MMHG | HEIGHT: 60 IN | WEIGHT: 114 LBS | BODY MASS INDEX: 22.38 KG/M2 | HEART RATE: 91 BPM | TEMPERATURE: 97.8 F

## 2021-01-11 DIAGNOSIS — R53.83 FEELING TIRED: ICD-10-CM

## 2021-01-11 DIAGNOSIS — G44.229 CHRONIC TENSION-TYPE HEADACHE, NOT INTRACTABLE: Primary | ICD-10-CM

## 2021-01-11 PROBLEM — Z14.1 CYSTIC FIBROSIS CARRIER: Status: ACTIVE | Noted: 2021-01-11

## 2021-01-11 PROBLEM — Z01.419 ENCOUNTER FOR ROUTINE GYNECOLOGICAL EXAMINATION: Status: RESOLVED | Noted: 2018-05-22 | Resolved: 2021-01-11

## 2021-01-11 PROCEDURE — 99203 OFFICE O/P NEW LOW 30 MIN: CPT | Performed by: PHYSICIAN ASSISTANT

## 2021-01-11 RX ORDER — CYCLOBENZAPRINE HCL 5 MG
TABLET ORAL
Qty: 10 TABLET | Refills: 0 | Status: SHIPPED | OUTPATIENT
Start: 2021-01-11 | End: 2021-06-04

## 2021-01-11 NOTE — PROGRESS NOTES
Assessment/Plan: We are starting your care today  Based on your history and exam you are experiencing muscular tension headaches associated with some neck pain as well  Prescription for muscle relaxant that you may take 1/2 to 1 tablet at bedtime for the next 3-5 nights in a row to help with the muscle tension and pain  Please get the labs completed  Also reviewed importance of staying well hydrated and not skipping any meals as these also contribute to chronic headaches  Will get you scheduled for a follow-up visit to review your labs and medication  Will then further discuss possible referral to physical therapy as this will help also alleviate your pain  Continue routine follow-up with your gyn as scheduled  Also discussed the importance of receiving a flu vaccine every year  No problem-specific Assessment & Plan notes found for this encounter  Diagnoses and all orders for this visit:    Chronic tension-type headache, not intractable  -     Comprehensive metabolic panel  -     TSH, 3rd generation with Free T4 reflex  -     cyclobenzaprine (FLEXERIL) 5 mg tablet; Take 1/2 to 1 pill HS for muscle tension/ headache    Feeling tired  -     TSH, 3rd generation with Free T4 reflex          Subjective:      Patient ID: Atul Loomis is a 23 y o  female  New patient to establish care  No meds daily  Taking an over-the-counter vitamin, multi    Patient here regarding headaches  States started about 1 year ago  States sometimes will have every day, and 2 weeks ago headache got worse and was from back of neck up to top of scalp  No N/V  Symptoms throbbing but most of the time it is just a tight pressure  States sleeping 7-8 hours  Patient states initially she thought it might have been related distress as with COVID in working in [employer] she was out of work and their financial issues    Since then she states there has been no stress at home at work but continues to have the headaches  Drinks water but not enough  States does not eat well will sometimes skip and only eat once a day  Works restaurant  Does not drink a lot of coffee or energy drinks  No exercise  Lives with son and   No stress in home now  Discussed with patient based on her exam and location she is having tension headaches  Will get her started on medication and a trial of muscle relaxant as she definitely has significant tenderness in her trapezius muscles  Will also get labs  Discussed importance of not skipping any meals and increasing water intake  The following portions of the patient's history were reviewed and updated as appropriate: allergies, current medications, past family history, past medical history, past social history, past surgical history and problem list     Review of Systems   Constitutional: Negative  Negative for activity change, appetite change, chills, fever and unexpected weight change  HENT: Negative  Negative for congestion and sinus pressure  Eyes: Negative  Respiratory: Negative  No asthma   Cardiovascular: Negative  Negative for chest pain and palpitations  Gastrointestinal: Negative  Negative for abdominal pain, constipation and diarrhea  Endocrine: Negative  Negative for cold intolerance, heat intolerance, polydipsia, polyphagia and polyuria  Genitourinary: Negative  Musculoskeletal: Positive for myalgias  No fractures  Skin: Negative for rash  Neurological: Positive for headaches  Negative for dizziness, tremors, speech difficulty, weakness and light-headedness  Psychiatric/Behavioral: Negative  Objective:      /79 (BP Location: Left arm, Patient Position: Sitting, Cuff Size: Standard)   Pulse 91   Temp 97 8 °F (36 6 °C) (Temporal)   Ht 5' (1 524 m)   Wt 51 7 kg (114 lb)   SpO2 99%   BMI 22 26 kg/m²          Physical Exam  Vitals signs and nursing note reviewed     Constitutional: General: She is not in acute distress  Appearance: She is normal weight  She is not ill-appearing  HENT:      Head: Normocephalic  Eyes:      Conjunctiva/sclera: Conjunctivae normal       Pupils: Pupils are equal, round, and reactive to light  Neck:      Musculoskeletal: Neck supple  Vascular: No carotid bruit  Cardiovascular:      Rate and Rhythm: Normal rate and regular rhythm  Heart sounds: Normal heart sounds  Pulmonary:      Effort: Pulmonary effort is normal       Breath sounds: Normal breath sounds  Abdominal:      General: Bowel sounds are normal       Palpations: Abdomen is soft  Tenderness: There is no abdominal tenderness  Musculoskeletal:         General: Tenderness present  No swelling or deformity  Comments: Patient has moderate trigger points to bilateral trapezius and some discomfort with palpation to cervical paraspinal muscle  Patient has no vertebral body tenderness  Cervical full range of motion without limitations   5/5 equal bilateral upper and lower extremities  Skin:     Findings: No rash  Neurological:      General: No focal deficit present  Mental Status: She is alert  Psychiatric:         Mood and Affect: Mood normal          Thought Content:  Thought content normal          Judgment: Judgment normal

## 2021-01-18 ENCOUNTER — LAB (OUTPATIENT)
Dept: LAB | Facility: HOSPITAL | Age: 26
End: 2021-01-18
Payer: COMMERCIAL

## 2021-01-18 LAB
ALBUMIN SERPL BCP-MCNC: 4.3 G/DL (ref 3.5–5)
ALP SERPL-CCNC: 90 U/L (ref 46–116)
ALT SERPL W P-5'-P-CCNC: 25 U/L (ref 12–78)
ANION GAP SERPL CALCULATED.3IONS-SCNC: 4 MMOL/L (ref 4–13)
AST SERPL W P-5'-P-CCNC: 11 U/L (ref 5–45)
BILIRUB SERPL-MCNC: 0.89 MG/DL (ref 0.2–1)
BUN SERPL-MCNC: 13 MG/DL (ref 5–25)
CALCIUM SERPL-MCNC: 10.7 MG/DL (ref 8.3–10.1)
CHLORIDE SERPL-SCNC: 116 MMOL/L (ref 100–108)
CO2 SERPL-SCNC: 24 MMOL/L (ref 21–32)
CREAT SERPL-MCNC: 0.61 MG/DL (ref 0.6–1.3)
GFR SERPL CREATININE-BSD FRML MDRD: 128 ML/MIN/1.73SQ M
GLUCOSE P FAST SERPL-MCNC: 88 MG/DL (ref 65–99)
POTASSIUM SERPL-SCNC: 3.8 MMOL/L (ref 3.5–5.3)
PROT SERPL-MCNC: 7.9 G/DL (ref 6.4–8.2)
SODIUM SERPL-SCNC: 144 MMOL/L (ref 136–145)
TSH SERPL DL<=0.05 MIU/L-ACNC: 1.72 UIU/ML (ref 0.36–3.74)

## 2021-01-18 PROCEDURE — 80053 COMPREHEN METABOLIC PANEL: CPT | Performed by: PHYSICIAN ASSISTANT

## 2021-01-18 PROCEDURE — 36415 COLL VENOUS BLD VENIPUNCTURE: CPT | Performed by: PHYSICIAN ASSISTANT

## 2021-01-18 PROCEDURE — 84443 ASSAY THYROID STIM HORMONE: CPT | Performed by: PHYSICIAN ASSISTANT

## 2021-01-19 DIAGNOSIS — E83.52 SERUM CALCIUM ELEVATED: Primary | ICD-10-CM

## 2021-01-28 ENCOUNTER — LAB (OUTPATIENT)
Dept: LAB | Facility: HOSPITAL | Age: 26
End: 2021-01-28
Payer: COMMERCIAL

## 2021-01-28 DIAGNOSIS — E83.52 SERUM CALCIUM ELEVATED: ICD-10-CM

## 2021-01-28 LAB
25(OH)D3 SERPL-MCNC: 25.6 NG/ML (ref 30–100)
CA-I BLD-SCNC: 1.38 MMOL/L (ref 1.12–1.32)
CALCIUM SERPL-MCNC: 11.3 MG/DL (ref 8.3–10.1)
PTH-INTACT SERPL-MCNC: 149.4 PG/ML (ref 18.4–80.1)

## 2021-01-28 PROCEDURE — 36415 COLL VENOUS BLD VENIPUNCTURE: CPT

## 2021-01-28 PROCEDURE — 83970 ASSAY OF PARATHORMONE: CPT

## 2021-01-28 PROCEDURE — 82330 ASSAY OF CALCIUM: CPT

## 2021-01-28 PROCEDURE — 82306 VITAMIN D 25 HYDROXY: CPT

## 2021-01-28 PROCEDURE — 82310 ASSAY OF CALCIUM: CPT

## 2021-02-08 ENCOUNTER — OFFICE VISIT (OUTPATIENT)
Dept: INTERNAL MEDICINE CLINIC | Facility: CLINIC | Age: 26
End: 2021-02-08

## 2021-02-08 VITALS
WEIGHT: 111 LBS | HEART RATE: 69 BPM | TEMPERATURE: 98.1 F | SYSTOLIC BLOOD PRESSURE: 106 MMHG | HEIGHT: 61 IN | BODY MASS INDEX: 20.96 KG/M2 | DIASTOLIC BLOOD PRESSURE: 72 MMHG

## 2021-02-08 DIAGNOSIS — Z28.20 IMMUNIZATION NOT CARRIED OUT BECAUSE OF PATIENT DECISION: ICD-10-CM

## 2021-02-08 DIAGNOSIS — E21.3 HYPERPARATHYROIDISM (HCC): Primary | ICD-10-CM

## 2021-02-08 PROCEDURE — 99213 OFFICE O/P EST LOW 20 MIN: CPT | Performed by: PHYSICIAN ASSISTANT

## 2021-02-08 RX ORDER — MELATONIN
1000 DAILY
Qty: 30 TABLET | Refills: 2 | Status: SHIPPED | OUTPATIENT
Start: 2021-02-08 | End: 2021-05-17

## 2021-02-08 NOTE — PROGRESS NOTES
Assessment/Plan: On your visit today we discussed your initial and follow-up lab results  We reviewed that it confirms that you do have elevated calcium as well as an elevated parathyroid hormone level  We discussed how this affects your calcium and vitamin-D levels and the affect it can have on your bones  As per our discussion referral has been provided to Dr Liliya Mcknight who will order the imaging that he would like to have as per the endocrinologist and will discuss treatment options after  As per our discussion I spoke with the endocrinologist and they would like you to complete a 24 hour calcium urine test   Script has been provided  Instructions will also be provided by the lab on how to collect this urine  We are placing you on vitamin-D as your level is slightly below normal   One tablet daily  Referral also provided today for endocrinology who will follow-up with you after treatment with the surgical specialist     Also reviewed that you declined your flu vaccine and aware of increased risk of complications without immunization  No problem-specific Assessment & Plan notes found for this encounter  Diagnoses and all orders for this visit:    Hyperparathyroidism ([address] )  -     Calcium, urine, 24 hour; Future  -     Ambulatory referral to Surgical Oncology; Future  -     cholecalciferol (VITAMIN D3) 1,000 units tablet; Take 1 tablet (1,000 Units total) by mouth daily  -     Ambulatory referral to Endocrinology; Future    Immunization not carried out because of patient decision          Subjective:      Patient ID: Zuri Parent is a 23 y o  female  Patient presents to the office today at my request for lab review  Patient completed initial labs which were all normal except for elevated calcium level  Follow-up labs confirm hypercalcemia, elevated ionized calcium, elevated PTH  Vitamin-D is slightly low at 25  Did discuss this with endocrinology prior to today's appointment    They agree will need referral to surgeon as likely primary hyperparathyroidism  Recommendation is 24 hour urine calcium  Also replace with small amount of vitamin D no more than 1000 units daily  Per endocrinologist no need to order the scan now but would need referral to Dr Domenick Blizzard for evaluation  Per endocrinology he prefers to see the patient an order the imaging  Int  # [id number]    No known FH parathyroid states Aunt in [facility] had thyroid issues  Neg kidney stones  No salivary stones    By interpretation patient understands information  She will complete the 24 hour urine for calcium  Will call and schedule with the surgical specialist who will then order appropriate imaging that they would like to have to determine ultimate treatment  He will also then get follow-up with the endocrinologist   Will place this order today as it can take a number of months prior to getting seen  Patient also reports that she is planning on discontinuing her Depo which she will discuss with her OBGYN  The following portions of the patient's history were reviewed and updated as appropriate: allergies, current medications, past family history, past medical history, past social history, past surgical history and problem list     Review of Systems   Constitutional: Negative  HENT: Negative  Respiratory: Negative  Negative for cough and shortness of breath  Cardiovascular: Negative  Gastrointestinal: Negative  Genitourinary: Negative  Musculoskeletal: Negative  Neurological: Negative  Psychiatric/Behavioral: Negative  Objective:      /72 (BP Location: Left arm, Patient Position: Sitting, Cuff Size: Standard)   Pulse 69   Temp 98 1 °F (36 7 °C) (Temporal)   Ht 5' 1" (1 549 m)   Wt 50 3 kg (111 lb)   BMI 20 97 kg/m²          Physical Exam  Vitals signs and nursing note reviewed  Constitutional:       General: She is not in acute distress  Appearance: She is normal weight  She is not ill-appearing  HENT:      Head: Normocephalic  Mouth/Throat:      Mouth: Mucous membranes are moist       Pharynx: Oropharynx is clear  Eyes:      Conjunctiva/sclera: Conjunctivae normal    Cardiovascular:      Rate and Rhythm: Normal rate and regular rhythm  Heart sounds: Normal heart sounds  Pulmonary:      Effort: Pulmonary effort is normal       Breath sounds: Normal breath sounds  Abdominal:      General: Abdomen is flat  Bowel sounds are normal       Palpations: Abdomen is soft  Tenderness: There is no abdominal tenderness  Skin:     General: Skin is warm and dry  Neurological:      Mental Status: She is alert  Cranial Nerves: No cranial nerve deficit  Sensory: No sensory deficit  Motor: No weakness  Deep Tendon Reflexes: Reflexes normal    Psychiatric:         Mood and Affect: Mood normal          Thought Content:  Thought content normal

## 2021-02-08 NOTE — PATIENT INSTRUCTIONS
On your visit today we discussed your initial and follow-up lab results  We reviewed that it confirms that you do have elevated calcium as well as an elevated parathyroid hormone level  We discussed how this affects your calcium and vitamin-D levels and the affect it can have on your bones  As per our discussion referral has been provided to Dr Andrew Flores who will order the imaging that he would like to have as per the endocrinologist and will discuss treatment options after  As per our discussion I spoke with the endocrinologist and they would like you to complete a 24 hour calcium urine test   Script has been provided  Instructions will also be provided by the lab on how to collect this urine  We are placing you on vitamin-D as your level is slightly below normal   One tablet daily  Referral also provided today for endocrinology who will follow-up with you after treatment with the surgical specialist     Also reviewed that you declined your flu vaccine and aware of increased risk of complications without immunization  Hiperparatiroidismo   LO QUE NECESITA SABER:   ¿Qué es el hiperparatiroidismo? El hiperparatiroidismo es deb condición que hace que sheila glándulas paratiroides produzcan demasiada hormona paratiroidea (PTH)  Las glándulas paratiroides son 4 glándulas pequeñas ubicadas cerca de la glándula tiroides en el tanja  La PTH mantiene el nivel de calcio en la chelsi  Cuando los niveles de la PTH son altos, se acumula demasiado calcio en la chelsi  Altos niveles de calcio pueden causar problemas de buddy julia la osteoporosis (huesos débiles y quebradizos), hipertensión arterial y cálculos renales  ¿Qué causa el hiperparatiroidismo? El hiperparatiroidismo puede ser causado por un tumor michael (no canceroso) en las glándulas paratiroides llamado adenoma  El hiperparatiroidismo también puede deberse al agrandamiento de las glándulas paratiroides   El adenoma o agrandamiento de las glándulas puede hacer que se produzca exceso de PTH  El hiperparatiroidismo también puede ser causado por la radiación en el tanja o por ciertos medicamentos, julia los diuréticos tiazídicos  En raras ocasiones el hiperparatiroidismo puede ser causado por un cáncer de la glándula paratiroides  ¿Cuáles son los signos y síntomas del hiperparatiroidismo? Es posible que no presente ningún signo ni síntoma o que tenga alguno de los siguientes:  · Debilidad muscular    · Fatiga    · Depresión o ansiedad    · Dolor generalizado en todo el cuerpo    · Dolor de huesos o articulaciones    · Pérdida del apetito    · Náuseas, vómitos o dolor abdominal    · Estreñimiento    · Confusión u olvidanza (olvido)    · Aumento de la sed y [place]    ¿Cómo se diagnostica el hiperparatiroidismo? Marie médico le preguntará acerca de sheila síntomas  También podría ordenarle exámenes de chelsi para revisar sheila niveles de calcio y de la paratiroides  Es posible que se le deban realizar otros exámenes para detectar problemas de buddy causados por el hiperparatiroidismo  ¿Cómo se trata el hiperparatiroidismo? Es posible que usted no necesite ningún tratamiento si usted no presenta síntomas  Marie médico puede monitorear marie condición a través de visitas periódicas y exámenes de [place]  Usted podría necesitar medicamentos para controlar la cantidad de PTH que sheila glándulas paratiroides producen  Es posible que también deban administrarle un medicamento para mantener sheila huesos ilda  Es posible que deba someterse a cirugía para que le extirpen un adenoma o las glándulas paratiroides  ¿Cómo puedo manejar el hiperparatiroidismo? Puede que necesite hacer lo siguiente si no se somete a cirugía para tratar marie hiperparatiroidismo  · Limitar la ingesta de calcio  Marie médico podría indicarle que limite marie ingesta a menos de 1200 mg por día  También puede necesitar limitar el consumo de vitamina D a menos de 600 UI diarias  · [address] se le haya indicado   Los líquidos pueden ayudar a prevenir la formación de cálculos renales  Pregunte cuánto líquido debe chester cada día y cuáles líquidos son los más adecuados para usted  · Realice actividad física con regularidad  Pregunte a marie médico acerca del mejor plan de ejercicio para usted  El ejercicio puede ayudar a desarrollar y a fortalecer los huesos  ¿Cuándo dallin buscar atención inmediata? · Marie corazón late más rápido o más lento de lo normal, o se siente julia aleteo en marie pecho  · Tiene náuseas, vómitos y dolor abdominal     · No puede pensar con claridad  ¿Cuándo dallin comunicarme con mi médico?  · Tiene dolor de huesos y articulaciones  · Tiene más sed o está orinando con más frecuencia que de costumbre  · Tiene deb pérdida de apetito  · Usted tiene preguntas o inquietudes acerca de marie condición o cuidado  ACUERDOS SOBRE MARIE CUIDADO:   Usted tiene el derecho de ayudar a planear marie cuidado  Aprenda todo lo que pueda sobre marie condición y julia darle tratamiento  Discuta sheila opciones de tratamiento con sheila médicos para decidir el cuidado que usted desea recibir  Usted siempre tiene el derecho de rechazar el tratamiento  Esta información es sólo para uso en educación  Marie intención no es darle un consejo médico sobre enfermedades o tratamientos  Colsulte con marie [employer] antes de seguir cualquier régimen médico para saber si es seguro y efectivo para usted  © Copyright [address] Information is for End User's use only and may not be sold, redistributed or otherwise used for commercial purposes   All illustrations and images included in CareNotes® are the copyrighted property of A D A M , Inc  or [employer] [facility] [employer]

## 2021-02-10 ENCOUNTER — TELEPHONE (OUTPATIENT)
Dept: HEMATOLOGY ONCOLOGY | Facility: CLINIC | Age: 26
End: 2021-02-10

## 2021-02-10 NOTE — TELEPHONE ENCOUNTER
New Patient Encounter    New Patient Intake Form   Patient Details:  Atul Loomis  1995  [id number]    Background Information:  [address] starts by opening a telephone encounter and gathering the following information   Who is calling to schedule? If not self, relationship to patient? self   Referring Provider Jess Maddox   What is the diagnosis? Hyperparathyroidism   Is this diagnosis confirmed? Yes   When was the diagnosis? 2/2021   Is there a confirmed diagnosis from a biopsy/tissue reviewed by pathology? no   Were outside slides requested? NA   Is patient aware of diagnosis? Yes   Is there a personal history and what kind? Is there a family history and what kind? Reason for visit? New Diagnosis   Have you had any imaging or labs done? If so: when, where? yes  [facility]   Are records in [facility]? yes   If patient has a prior history of breast cancer were old records obtained? NA   Was the patient told to bring a disk? no   Does the patient smoke or Vape? no   If yes, how many packs or cartridges per day? Scheduling Information:   Preferred [place]:  [facility]     Are there any dates/time the patient cannot be seen? Miscellaneous:    After completing the above information, please route to Financial Counselor and the appropriate Nurse Navigator for review

## 2021-03-15 ENCOUNTER — APPOINTMENT (OUTPATIENT)
Dept: LAB | Facility: HOSPITAL | Age: 26
End: 2021-03-15
Payer: COMMERCIAL

## 2021-03-22 ENCOUNTER — CONSULT (OUTPATIENT)
Dept: SURGICAL ONCOLOGY | Facility: CLINIC | Age: 26
End: 2021-03-22
Payer: COMMERCIAL

## 2021-03-22 VITALS
SYSTOLIC BLOOD PRESSURE: 112 MMHG | TEMPERATURE: 98.1 F | HEIGHT: 60 IN | WEIGHT: 112 LBS | BODY MASS INDEX: 21.99 KG/M2 | HEART RATE: 69 BPM | RESPIRATION RATE: 16 BRPM | DIASTOLIC BLOOD PRESSURE: 68 MMHG

## 2021-03-22 DIAGNOSIS — E21.3 HYPERPARATHYROIDISM (HCC): Primary | ICD-10-CM

## 2021-03-22 PROCEDURE — 99243 OFF/OP CNSLTJ NEW/EST LOW 30: CPT | Performed by: SURGERY

## 2021-03-22 NOTE — PROGRESS NOTES
Surgical Oncology Consult       [address] [facility] [place]  [address] [id number] [phone]    Matilde Andrade  1995  [id number]  [address] [facility] [place]  [address] [address] [phone]    Chief Complaint   Patient presents with    Consult    Hyperparathyroidism       Assessment/Plan:    No problem-specific Assessment & Plan notes found for this encounter  Diagnoses and all orders for this visit:    Hyperparathyroidism [facility])  -     Ambulatory referral to Surgical Oncology  -     NM parathyroid scan w spect; Future  -     NM parathyroid scan w spect; Future        Advance Care Planning/Advance Directives:  Discussed disease status, cancer treatment plans and/or cancer treatment goals with the patient  Oncology History    No history exists  History of Present Illness:   Patient is a 62-year-old woman who over last year has had pain in her back  She started having headaches as well  Workup revealed elevated calcium levels  This led to additional workup including a check of her PTH levels which were found to be elevated  She has been referred for evaluation and treatment  She denies a history of kidney stones  She denies a history of endocrine issues or disorders in her family  She does complain of weakness in her legs and arms  She does not report excessive fatigue, or issues with mood, memory, or concentration  Review of Systems:  Review of Systems   Constitutional: Negative for fatigue  HENT: Negative  Eyes: Negative  Cardiovascular: Negative  Gastrointestinal: Negative  Negative for abdominal pain  Endocrine: Negative  Genitourinary: Negative  Musculoskeletal: Positive for arthralgias, back pain, myalgias and neck pain  Allergic/Immunologic: Negative  Neurological: Positive for weakness  Hematological: Negative  Psychiatric/Behavioral: Negative  Negative for decreased concentration  The patient is not nervous/anxious          Patient Active Problem List   Diagnosis    Encounter for surveillance of injectable contraceptive    Herpes simplex infection    External hemorrhoids    Cystic fibrosis carrier    Hyperparathyroidism ([facility] [address] )     Past Medical History:   Diagnosis Date    Chronic pain      Past Surgical History:   Procedure Laterality Date    VAGINAL DELIVERY       Family History   Problem Relation Age of Onset    No Known Problems Mother     No Known Problems Father     No Known Problems Sister     No Known Problems Brother     No Known Problems Son     No Known Problems Sister     No Known Problems Sister      Social History     Socioeconomic History    Marital status: /Civil Union     Spouse name: Not on file    Number of children: Not on file    Years of education: Not on file    Highest education level: Not on file   Occupational History    Occupation: restaurant   Social Needs    Financial resource strain: Not hard at all   lark insecurity     Worry: Never true     Inability: Never true    Transportation needs     Medical: No     Non-medical: No   Tobacco Use    Smoking status: Never Smoker    Smokeless tobacco: Never Used   Substance and Sexual Activity    Alcohol use: Never     Frequency: Never     Binge frequency: Never    Drug use: Never    Sexual activity: Yes     Partners: Male     Birth control/protection: Injection   Lifestyle    Physical activity     Days per week: 0 days     Minutes per session: 0 min    Stress: Not at all   Relationships    Social connections     Talks on phone: Once a week     Gets together: Once a week     Attends Mosque service: Never     Active member of club or organization: No     Attends meetings of clubs or organizations: Never     Relationship status:     Intimate partner violence     Fear of current or ex partner: No     Emotionally abused: No     Physically abused: No     Forced sexual activity: No   Other Topics Concern    Not on file   Social History Narrative    Not on file       Current Outpatient Medications:     cholecalciferol (VITAMIN D3) 1,000 units tablet, Take 1 tablet (1,000 Units total) by mouth daily, Disp: 30 tablet, Rfl: 2    cyclobenzaprine (FLEXERIL) 5 mg tablet, Take 1/2 to 1 pill HS for muscle tension/ headache (Patient not taking: Reported on 2/8/2021), Disp: 10 tablet, Rfl: 0    medroxyPROGESTERone (DEPO-PROVERA) 150 mg/mL injection, Inject 1 mL (150 mg total) into a muscle every 3 (three) months (Patient not taking: Reported on 3/22/2021), Disp: 1 mL, Rfl: 4    Current Facility-Administered Medications:     medroxyPROGESTERone (DEPO-PROVERA) IM injection 150 mg, 150 mg, Intramuscular, Q3 Months, Vijay Medina MD, 150 mg at 12/21/20 1322  No Known Allergies  Vitals:    03/22/21 1430   BP: 112/68   Pulse: 69   Resp: 16   Temp: 98 1 °F (36 7 °C)       Physical Exam  Constitutional:       Appearance: Normal appearance  She is normal weight  HENT:      Head: Normocephalic and atraumatic  Right Ear: External ear normal       Left Ear: External ear normal       Nose: Nose normal    Eyes:      General: No scleral icterus  Extraocular Movements: Extraocular movements intact  Pupils: Pupils are equal, round, and reactive to light  Neck:      Musculoskeletal: Normal range of motion and neck supple  No neck rigidity  Cardiovascular:      Rate and Rhythm: Normal rate and regular rhythm  Pulses: Normal pulses  Heart sounds: Normal heart sounds  Pulmonary:      Breath sounds: Normal breath sounds  Abdominal:      General: Abdomen is flat  Palpations: Abdomen is soft  Lymphadenopathy:      Cervical: No cervical adenopathy  Skin:     General: Skin is warm and dry  Neurological:      General: No focal deficit present        Mental Status: She is alert and oriented to person, place, and time  Psychiatric:         Mood and Affect: Mood normal          Behavior: Behavior normal          Thought Content: Thought content normal          Judgment: Judgment normal            Results:  Labs:  CBC, Coags, BMP, Mg, Phos   Lab Results   Component Value Date     4 (H) 01/28/2021    CALCIUM 11 3 (H) 01/28/2021       Imaging  No results found  I reviewed the above laboratory and imaging data  Discussion/Summary:  74-year-old woman, suspected primary hyperparathyroidism  Will set her up for sestamibi scan to look for potential target  Rationale for this discussed with her and her   They understand the plan wish to proceed as outlined  All questions answered

## 2021-03-22 NOTE — LETTER
March 22, 2021     Mick Gar PA-C  [address] [facility]    Patient: Matilde Andrade   YOB: 1995   Date of Visit: 3/22/2021       Dear Dr Joy Gutiérrez: Thank you for referring Paula Rezaos to me for evaluation  Below are my notes for this consultation  If you have questions, please do not hesitate to call me  I look forward to following your patient along with you  Sincerely,        Justin Barnes MD        CC: MD Kendall Decker MD Christopher Grey, MD Hilarie Roa, MD Florance Rothman, CRNP Evy Kitty, MD Rich Gamma, MD  3/22/2021  2:56 PM  Sign when Signing Visit     Surgical Oncology Consult       [address] [facility] [place]  [address] [facility] [address]  [phone]    Matilde Andrade  1995  [id number]  [address] [facility] Sharlene Morning  [address] [facility] [address] [phone]    Chief Complaint   Patient presents with    Consult    Hyperparathyroidism       Assessment/Plan:    No problem-specific Assessment & Plan notes found for this encounter  Diagnoses and all orders for this visit:    Hyperparathyroidism [facility])  -     Ambulatory referral to Surgical Oncology  -     NM parathyroid scan w spect; Future  -     NM parathyroid scan w spect; Future        Advance Care Planning/Advance Directives:  Discussed disease status, cancer treatment plans and/or cancer treatment goals with the patient  Oncology History    No history exists  History of Present Illness:   Patient is a 28-year-old woman who over last year has had pain in her back  She started having headaches as well  Workup revealed elevated calcium levels  This led to additional workup including a check of her PTH levels which were found to be elevated  She has been referred for evaluation and treatment    She denies a history of kidney stones  She denies a history of endocrine issues or disorders in her family  She does complain of weakness in her legs and arms  She does not report excessive fatigue, or issues with mood, memory, or concentration  Review of Systems:  Review of Systems   Constitutional: Negative for fatigue  HENT: Negative  Eyes: Negative  Cardiovascular: Negative  Gastrointestinal: Negative  Negative for abdominal pain  Endocrine: Negative  Genitourinary: Negative  Musculoskeletal: Positive for arthralgias, back pain, myalgias and neck pain  Allergic/Immunologic: Negative  Neurological: Positive for weakness  Hematological: Negative  Psychiatric/Behavioral: Negative  Negative for decreased concentration  The patient is not nervous/anxious          Patient Active Problem List   Diagnosis    Encounter for surveillance of injectable contraceptive    Herpes simplex infection    External hemorrhoids    Cystic fibrosis carrier    Hyperparathyroidism ([facility] [address] )     Past Medical History:   Diagnosis Date    Chronic pain      Past Surgical History:   Procedure Laterality Date    VAGINAL DELIVERY       Family History   Problem Relation Age of Onset    No Known Problems Mother     No Known Problems Father     No Known Problems Sister     No Known Problems Brother     No Known Problems Son     No Known Problems Sister     No Known Problems Sister      Social History     Socioeconomic History    Marital status: /Civil Union     Spouse name: Not on file    Number of children: Not on file    Years of education: Not on file    Highest education level: Not on file   Occupational History    Occupation: restaurant   Social Needs    Financial resource strain: Not hard at all   [place] insecurity     Worry: Never true     Inability: Never true    Transportation needs     Medical: No     Non-medical: No   Tobacco Use    Smoking status: Never Smoker    Smokeless tobacco: Never Used   Substance and Sexual Activity    Alcohol use: Never     Frequency: Never     Binge frequency: Never    Drug use: Never    Sexual activity: Yes     Partners: Male     Birth control/protection: Injection   Lifestyle    Physical activity     Days per week: 0 days     Minutes per session: 0 min    Stress: Not at all   Relationships    Social connections     Talks on phone: Once a week     Gets together: Once a week     Attends Buddhism service: Never     Active member of club or organization: No     Attends meetings of clubs or organizations: Never     Relationship status:     Intimate partner violence     Fear of current or ex partner: No     Emotionally abused: No     Physically abused: No     Forced sexual activity: No   Other Topics Concern    Not on file   Social History Narrative    Not on file       Current Outpatient Medications:     cholecalciferol (VITAMIN D3) 1,000 units tablet, Take 1 tablet (1,000 Units total) by mouth daily, Disp: 30 tablet, Rfl: 2    cyclobenzaprine (FLEXERIL) 5 mg tablet, Take 1/2 to 1 pill HS for muscle tension/ headache (Patient not taking: Reported on 2/8/2021), Disp: 10 tablet, Rfl: 0    medroxyPROGESTERone (DEPO-PROVERA) 150 mg/mL injection, Inject 1 mL (150 mg total) into a muscle every 3 (three) months (Patient not taking: Reported on 3/22/2021), Disp: 1 mL, Rfl: 4    Current Facility-Administered Medications:     medroxyPROGESTERone (DEPO-PROVERA) IM injection 150 mg, 150 mg, Intramuscular, Q3 Months, Chidi Lewis MD, 150 mg at 12/21/20 1322  No Known Allergies  Vitals:    03/22/21 1430   BP: 112/68   Pulse: 69   Resp: 16   Temp: 98 1 °F (36 7 °C)       Physical Exam  Constitutional:       Appearance: Normal appearance  She is normal weight  HENT:      Head: Normocephalic and atraumatic  Right Ear: External ear normal       Left Ear: External ear normal       Nose: Nose normal    Eyes:      General: No scleral icterus  Extraocular Movements: Extraocular movements intact  Pupils: Pupils are equal, round, and reactive to light  Neck:      Musculoskeletal: Normal range of motion and neck supple  No neck rigidity  Cardiovascular:      Rate and Rhythm: Normal rate and regular rhythm  Pulses: Normal pulses  Heart sounds: Normal heart sounds  Pulmonary:      Breath sounds: Normal breath sounds  Abdominal:      General: Abdomen is flat  Palpations: Abdomen is soft  Lymphadenopathy:      Cervical: No cervical adenopathy  Skin:     General: Skin is warm and dry  Neurological:      General: No focal deficit present  Mental Status: She is alert and oriented to person, place, and time  Psychiatric:         Mood and Affect: Mood normal          Behavior: Behavior normal          Thought Content: Thought content normal          Judgment: Judgment normal            Results:  Labs:  CBC, Coags, BMP, Mg, Phos   Lab Results   Component Value Date     4 (H) 01/28/2021    CALCIUM 11 3 (H) 01/28/2021       Imaging  No results found  I reviewed the above laboratory and imaging data  Discussion/Summary:  72-year-old woman, suspected primary hyperparathyroidism  Will set her up for sestamibi scan to look for potential target  Rationale for this discussed with her and her   They understand the plan wish to proceed as outlined  All questions answered

## 2021-04-02 ENCOUNTER — HOSPITAL ENCOUNTER (OUTPATIENT)
Dept: RADIOLOGY | Facility: HOSPITAL | Age: 26
Discharge: HOME | End: 2021-04-02
Attending: SURGERY
Payer: COMMERCIAL

## 2021-04-02 DIAGNOSIS — E21.3 HYPERPARATHYROIDISM (HCC): ICD-10-CM

## 2021-04-02 PROCEDURE — 78071 PARATHYRD PLANAR W/WO SUBTRJ: CPT

## 2021-04-02 PROCEDURE — A9500 TC99M SESTAMIBI: HCPCS

## 2021-04-02 PROCEDURE — G1004 CDSM NDSC: HCPCS

## 2021-04-12 ENCOUNTER — OFFICE VISIT (OUTPATIENT)
Dept: SURGICAL ONCOLOGY | Facility: CLINIC | Age: 26
End: 2021-04-12
Payer: COMMERCIAL

## 2021-04-12 VITALS
BODY MASS INDEX: 21.99 KG/M2 | HEIGHT: 60 IN | TEMPERATURE: 96.7 F | HEART RATE: 56 BPM | DIASTOLIC BLOOD PRESSURE: 70 MMHG | WEIGHT: 112 LBS | RESPIRATION RATE: 16 BRPM | SYSTOLIC BLOOD PRESSURE: 110 MMHG

## 2021-04-12 DIAGNOSIS — E21.3 HYPERPARATHYROIDISM (HCC): Primary | ICD-10-CM

## 2021-04-12 PROCEDURE — 99214 OFFICE O/P EST MOD 30 MIN: CPT | Performed by: SURGERY

## 2021-04-12 RX ORDER — TRAMADOL HYDROCHLORIDE 50 MG/1
50 TABLET ORAL EVERY 6 HOURS PRN
Qty: 10 TABLET | Refills: 0 | Status: SHIPPED | OUTPATIENT
Start: 2021-04-12 | End: 2021-08-17 | Stop reason: ALTCHOICE

## 2021-04-12 NOTE — PROGRESS NOTES
Surgical Oncology Follow Up       [address] [facility]  [address]  [facility] [address]  [phone]    Herber Goodrich  1995  [id number]  [address] [facility] SURG[facility] ON[facility] Boris Cedeño  [address] [facility] [address]  [phone]    Chief Complaint   Patient presents with    Follow-up     F/U after Sestamibi scan       Assessment/Plan:    No problem-specific Assessment & Plan notes found for this encounter  Diagnoses and all orders for this visit:    Hyperparathyroidism ([address] )  -     traMADol (ULTRAM) 50 mg tablet; Take 1 tablet (50 mg total) by mouth every 6 (six) hours as needed for moderate pain  -     Case request operating room: PARATHYROIDECTOMY, MINIMALLY INVASIVE, right, MONITORING INTRAOPERATIVE PTH (PARATHYROID HORMONE); Standing  -     CBC and differential; Future  -     Comprehensive metabolic panel; Future  -     EKG 12 lead; Future  -     XR chest pa & lateral; Future    Other orders  -     Incentive spirometry; Standing  -     Insert and maintain IV line; Standing  -     Void On-Call to O R ; Standing  -     Place sequential compression device; Standing  -     PTH, intact; Standing        Advance Care Planning/Advance Directives:  Discussed disease status, cancer treatment plans and/or cancer treatment goals with the patient  Oncology History    No history exists  History of Present Illness:   [facility] is a 77-year-old woman here for follow-up visit status post sestamibi scan to look for potential parathyroid target   -Interval History:  No new issues or complaints since her procedure was done  Review of Systems:  Review of Systems   Constitutional: Positive for fatigue  HENT: Negative  Eyes: Negative  Respiratory: Negative  Cardiovascular: Negative  Gastrointestinal: Negative  Endocrine: Negative  Genitourinary: Negative      Musculoskeletal: Negative  Skin: Negative  Allergic/Immunologic: Negative  Neurological: Negative  Hematological: Negative  Psychiatric/Behavioral: Negative          Patient Active Problem List   Diagnosis    Encounter for surveillance of injectable contraceptive    Herpes simplex infection    External hemorrhoids    Cystic fibrosis carrier    Hyperparathyroidism ([facility] [address] )     Past Medical History:   Diagnosis Date    Chronic pain      Past Surgical History:   Procedure Laterality Date    VAGINAL DELIVERY       Family History   Problem Relation Age of Onset    No Known Problems Mother     No Known Problems Father     No Known Problems Sister     No Known Problems Brother     No Known Problems Son     No Known Problems Sister     No Known Problems Sister      Social History     Socioeconomic History    Marital status: /Civil Union     Spouse name: Not on file    Number of children: Not on file    Years of education: Not on file    Highest education level: Not on file   Occupational History    Occupation: restaurant   Social Needs    Financial resource strain: Not hard at all   [employer] insecurity     Worry: Never true     Inability: Never true    Transportation needs     Medical: No     Non-medical: No   Tobacco Use    Smoking status: Never Smoker    Smokeless tobacco: Never Used   Substance and Sexual Activity    Alcohol use: Never     Frequency: Never     Binge frequency: Never    Drug use: Never    Sexual activity: Yes     Partners: Male     Birth control/protection: Injection   Lifestyle    Physical activity     Days per week: 0 days     Minutes per session: 0 min    Stress: Not at all   Relationships    Social connections     Talks on phone: Once a week     Gets together: Once a week     Attends Confucianism service: Never     Active member of club or organization: No     Attends meetings of clubs or organizations: Never     Relationship status:     Intimate partner violence Fear of current or ex partner: No     Emotionally abused: No     Physically abused: No     Forced sexual activity: No   Other Topics Concern    Not on file   Social History Narrative    Not on file       Current Outpatient Medications:     cholecalciferol (VITAMIN D3) 1,000 units tablet, Take 1 tablet (1,000 Units total) by mouth daily, Disp: 30 tablet, Rfl: 2    cyclobenzaprine (FLEXERIL) 5 mg tablet, Take 1/2 to 1 pill HS for muscle tension/ headache (Patient not taking: Reported on 2/8/2021), Disp: 10 tablet, Rfl: 0    medroxyPROGESTERone (DEPO-PROVERA) 150 mg/mL injection, Inject 1 mL (150 mg total) into a muscle every 3 (three) months (Patient not taking: Reported on 3/22/2021), Disp: 1 mL, Rfl: 4    traMADol (ULTRAM) 50 mg tablet, Take 1 tablet (50 mg total) by mouth every 6 (six) hours as needed for moderate pain, Disp: 10 tablet, Rfl: 0    Current Facility-Administered Medications:     medroxyPROGESTERone (DEPO-PROVERA) IM injection 150 mg, 150 mg, Intramuscular, Q3 Months, Raffaele Stuart MD, 150 mg at 12/21/20 1322  No Known Allergies  Vitals:    04/12/21 0855   BP: 110/70   Pulse: 56   Resp: 16   Temp: (!) 96 7 °F (35 9 °C)       Physical Exam  Constitutional:       Appearance: Normal appearance  HENT:      Head: Normocephalic and atraumatic  Right Ear: External ear normal       Left Ear: External ear normal       Nose: Nose normal    Eyes:      General: No scleral icterus  Extraocular Movements: Extraocular movements intact  Pupils: Pupils are equal, round, and reactive to light  Neck:      Musculoskeletal: Normal range of motion  No neck rigidity  Cardiovascular:      Rate and Rhythm: Normal rate and regular rhythm  Pulses: Normal pulses  Heart sounds: Normal heart sounds  Pulmonary:      Effort: Pulmonary effort is normal       Breath sounds: Normal breath sounds  Abdominal:      General: Abdomen is flat  Palpations: Abdomen is soft     Musculoskeletal: Normal range of motion  Lymphadenopathy:      Cervical: No cervical adenopathy  Skin:     General: Skin is warm and dry  Neurological:      General: No focal deficit present  Mental Status: She is alert and oriented to person, place, and time  Psychiatric:         Mood and Affect: Mood normal          Behavior: Behavior normal          Thought Content: Thought content normal          Judgment: Judgment normal            Results:  Labs:  Lab Results   Component Value Date     4 (H) 01/28/2021    CALCIUM 11 3 (H) 01/28/2021         Imaging  Nm Parathyroid Scan W Spect    Result Date: 4/5/2021  Narrative: PARATHYROID SCAN INDICATION:  E21 3: Hyperparathyroidism, unspecified COMPARISON:  None  TECHNIQUE:   Following the intravenous administration of 26 1 mCi Tc-99m Cardiolite, anterior and bilateral anterior oblique projection images of the neck and mediastinum were obtained at approximately 10 minutes post injection followed at 2 hours post injection by static anterior and bilateral oblique projections as well as SPECT images in coronal, sagittal and axial projections  FINDINGS: Static images demonstrate slightly greater radiotracer uptake in the right lobe of the thyroid gland compared to the left  Delayed images demonstrate washout of thyroid gland activity  Residual focus of radiotracer uptake noted in the right lower pole region  SPECT images demonstrate focal radiotracer uptake in the right lower pole region  This appears to be at the posterior aspect of the thyroid gland  Impression: 1  Focal radiotracer uptake in the right lower pole region suspicious for parathyroid adenoma  Workstation performed: [id number]     I reviewed the above laboratory and imaging data  Discussion/Summary:  82-year-old woman, primary hyperparathyroidism  She has a target on the right side    She is amenable to minimally Invasive right parathyroidectomy, possible 4 gland exploration with intraoperative PTH monitoring  Rationale for this along with risks and benefits of surgery including infection, bleeding, need for possible additional surgery, discussed with her and her   They understand the plan wish to proceed as outlined  All questions answered and consents signed at this visit

## 2021-04-12 NOTE — LETTER
April 12, 2021     Chance Castillo PA-C  [address]  [address] [facility]    Patient: Tuyet Hunter   YOB: 1995   Date of Visit: 4/12/2021       Dear Dr Thaddeus Shen: Thank you for referring Alice Oscar to me for evaluation  Below are my notes for this consultation  If you have questions, please do not hesitate to call me  I look forward to following your patient along with you  Sincerely,        Joe Rizvi MD        CC: MD Joe Walters MD  4/12/2021  9:22 AM  Sign when Signing Visit     Surgical Oncology Follow Up       [address] [facility] [address] [address] [phone]    Tuyet Hunter  1995  [id number]  [address] [facility]ARE SURGICAL ONCOLOGY [address] [id number] [phone]    Chief Complaint   Patient presents with    Follow-up     F/U after Sestamibi scan       Assessment/Plan:    No problem-specific Assessment & Plan notes found for this encounter  Diagnoses and all orders for this visit:    Hyperparathyroidism ([address] )  -     traMADol (ULTRAM) 50 mg tablet; Take 1 tablet (50 mg total) by mouth every 6 (six) hours as needed for moderate pain  -     Case request operating room: PARATHYROIDECTOMY, MINIMALLY INVASIVE, right, MONITORING INTRAOPERATIVE PTH (PARATHYROID HORMONE); Standing  -     CBC and differential; Future  -     Comprehensive metabolic panel; Future  -     EKG 12 lead;  Future  -     XR chest pa & lateral; Future    Other orders  -     Incentive spirometry; Standing  -     Insert and maintain IV line; Standing  -     Void On-Call to O R ; Standing  -     Place sequential compression device; Standing  -     PTH, intact; Standing        Advance Care Planning/Advance Directives:  Discussed disease status, cancer treatment plans and/or cancer treatment goals with the patient  Oncology History    No history exists  History of Present Illness:   Polo Barton is a 72-year-old woman here for follow-up visit status post sestamibi scan to look for potential parathyroid target   -Interval History:  No new issues or complaints since her procedure was done  Review of Systems:  Review of Systems   Constitutional: Positive for fatigue  HENT: Negative  Eyes: Negative  Respiratory: Negative  Cardiovascular: Negative  Gastrointestinal: Negative  Endocrine: Negative  Genitourinary: Negative  Musculoskeletal: Negative  Skin: Negative  Allergic/Immunologic: Negative  Neurological: Negative  Hematological: Negative  Psychiatric/Behavioral: Negative          Patient Active Problem List   Diagnosis    Encounter for surveillance of injectable contraceptive    Herpes simplex infection    External hemorrhoids    Cystic fibrosis carrier    Hyperpar[address] ([facility] [address] )     Past Medical History:   Diagnosis Date    Chronic pain      Past Surgical History:   Procedure Laterality Date    VAGINAL DELIVERY       Family History   Problem Relation Age of Onset    No Known Problems Mother     No Known Problems Father     No Known Problems Sister     No Known Problems Brother     No Known Problems Son     No Known Problems Sister     No Known Problems Sister      Social History     Socioeconomic History    Marital status: /Civil Union     Spouse name: Not on file    Number of children: Not on file    Years of education: Not on file    Highest education level: Not on file   Occupational History    Occupation: restaurant   Social Needs    Financial resource strain: Not hard at all   [place] insecurity     Worry: Never true     Inability: Never true    Transportation needs     Medical: No     Non-medical: No   Tobacco Use    Smoking status: Never Smoker    Smokeless tobacco: Never Used   Substance and Sexual Activity    Alcohol use: Never Frequency: Never     Binge frequency: Never    Drug use: Never    Sexual activity: Yes     Partners: Male     Birth control/protection: Injection   Lifestyle    Physical activity     Days per week: 0 days     Minutes per session: 0 min    Stress: Not at all   Relationships    Social connections     Talks on phone: Once a week     Gets together: Once a week     Attends Hoahaoism service: Never     Active member of club or organization: No     Attends meetings of clubs or organizations: Never     Relationship status:     Intimate partner violence     Fear of current or ex partner: No     Emotionally abused: No     Physically abused: No     Forced sexual activity: No   Other Topics Concern    Not on file   Social History Narrative    Not on file       Current Outpatient Medications:     cholecalciferol (VITAMIN D3) 1,000 units tablet, Take 1 tablet (1,000 Units total) by mouth daily, Disp: 30 tablet, Rfl: 2    cyclobenzaprine (FLEXERIL) 5 mg tablet, Take 1/2 to 1 pill HS for muscle tension/ headache (Patient not taking: Reported on 2/8/2021), Disp: 10 tablet, Rfl: 0    medroxyPROGESTERone (DEPO-PROVERA) 150 mg/mL injection, Inject 1 mL (150 mg total) into a muscle every 3 (three) months (Patient not taking: Reported on 3/22/2021), Disp: 1 mL, Rfl: 4    traMADol (ULTRAM) 50 mg tablet, Take 1 tablet (50 mg total) by mouth every 6 (six) hours as needed for moderate pain, Disp: 10 tablet, Rfl: 0    Current Facility-Administered Medications:     medroxyPROGESTERone (DEPO-PROVERA) IM injection 150 mg, 150 mg, Intramuscular, Q3 Months, Cynthia Price MD, 150 mg at 12/21/20 1322  No Known Allergies  Vitals:    04/12/21 0855   BP: 110/70   Pulse: 56   Resp: 16   Temp: (!) 96 7 °F (35 9 °C)       Physical Exam  Constitutional:       Appearance: Normal appearance  HENT:      Head: Normocephalic and atraumatic        Right Ear: External ear normal       Left Ear: External ear normal       Nose: Nose normal  Eyes:      General: No scleral icterus  Extraocular Movements: Extraocular movements intact  Pupils: Pupils are equal, round, and reactive to light  Neck:      Musculoskeletal: Normal range of motion  No neck rigidity  Cardiovascular:      Rate and Rhythm: Normal rate and regular rhythm  Pulses: Normal pulses  Heart sounds: Normal heart sounds  Pulmonary:      Effort: Pulmonary effort is normal       Breath sounds: Normal breath sounds  Abdominal:      General: Abdomen is flat  Palpations: Abdomen is soft  Musculoskeletal: Normal range of motion  Lymphadenopathy:      Cervical: No cervical adenopathy  Skin:     General: Skin is warm and dry  Neurological:      General: No focal deficit present  Mental Status: She is alert and oriented to person, place, and time  Psychiatric:         Mood and Affect: Mood normal          Behavior: Behavior normal          Thought Content: Thought content normal          Judgment: Judgment normal            Results:  Labs:  Lab Results   Component Value Date     4 (H) 01/28/2021    CALCIUM 11 3 (H) 01/28/2021         Imaging  Nm Parathyroid Scan W Spect    Result Date: 4/5/2021  Narrative: PARATHYROID SCAN INDICATION:  E21 3: Hyperparathyroidism, unspecified COMPARISON:  None  TECHNIQUE:   Following the intravenous administration of 26 1 mCi Tc-99m Cardiolite, anterior and bilateral anterior oblique projection images of the neck and mediastinum were obtained at approximately 10 minutes post injection followed at 2 hours post injection by static anterior and bilateral oblique projections as well as SPECT images in coronal, sagittal and axial projections  FINDINGS: Static images demonstrate slightly greater radiotracer uptake in the right lobe of the thyroid gland compared to the left  Delayed images demonstrate washout of thyroid gland activity  Residual focus of radiotracer uptake noted in the right lower pole region   SPECT images demonstrate focal radiotracer uptake in the right lower pole region  This appears to be at the posterior aspect of the thyroid gland  Impression: 1  Focal radiotracer uptake in the right lower pole region suspicious for parathyroid adenoma  Workstation performed: [id number]     I reviewed the above laboratory and imaging data  Discussion/Summary:  28-year-old woman, primary hyperparathyroidism  She has a target on the right side  She is amenable to minimally Invasive right parathyroidectomy, possible 4 gland exploration with intraoperative PTH monitoring  Rationale for this along with risks and benefits of surgery including infection, bleeding, need for possible additional surgery, discussed with her and her   They understand the plan wish to proceed as outlined  All questions answered and consents signed at this visit

## 2021-05-04 ENCOUNTER — TELEPHONE (OUTPATIENT)
Dept: INTERNAL MEDICINE CLINIC | Facility: CLINIC | Age: 26
End: 2021-05-04

## 2021-05-04 NOTE — TELEPHONE ENCOUNTER
We called patient to schedule an Endocrinology appointment  She was unsure if she still need this appointment because she is having surgery in June with Surgery Oncology  Please let patient know  's response

## 2021-05-05 NOTE — TELEPHONE ENCOUNTER
Patient called back  I relayed information  She scheduled an appointment for July with Endocrinology

## 2021-05-15 DIAGNOSIS — E21.3 HYPERPARATHYROIDISM (HCC): ICD-10-CM

## 2021-05-16 ENCOUNTER — NURSE TRIAGE (OUTPATIENT)
Dept: OTHER | Facility: OTHER | Age: 26
End: 2021-05-16

## 2021-05-16 NOTE — TELEPHONE ENCOUNTER
Regarding: concerns about getting period twice this month ( needed)  ----- Message from Hexaformer Nani sent at 5/16/2021 11:09 AM EDT -----  "I got my period two times this month and I want to know if that is normal "

## 2021-05-16 NOTE — TELEPHONE ENCOUNTER
Reason for Disposition   Recently took emergency contraceptive pills (ECP)    Answer Assessment - Initial Assessment Questions  1  AMOUNT: "Describe the bleeding that you are having "     - SPOTTING: spotting, or pinkish / brownish mucous discharge; does not fill panti-liner or pad     - MILD:  less than 1 pad / hour; less than patient's usual menstrual bleeding    - MODERATE: 1-2 pads / hour; 1 menstrual cup every 6 hours; small-medium blood clots (e g , pea, grape, small coin)    - SEVERE: soaking 2 or more pads/hour for 2 or more hours; 1 menstrual cup every 2 hours; bleeding not contained by pads or continuous red blood from vagina; large blood clots (e g , golf ball, large coin)       Mild, states the vaginal discharge is red and dark brown in color     2  ONSET: "When did the bleeding begin?" "Is it continuing now?"      States go her period at the beginning of the month lasted about 4 days and noted vaginal bleeding this morning again      3  MENSTRUAL PERIOD: "When was the last normal menstrual period?" "How is this different than your period?"      May 1-May 4th    4  REGULARITY: "How regular are your periods?"      Denies due to being on Depo injections and recently d/c it in December  However since March has had regular monthly periods    5  ABDOMINAL PAIN: "Do you have any pain?" "How bad is the pain?"  (e g , Scale 1-10; mild, moderate, or severe)    - MILD (1-3): doesn't interfere with normal activities, abdomen soft and not tender to touch     - MODERATE (4-7): interferes with normal activities or awakens from sleep, tender to touch     - SEVERE (8-10): excruciating pain, doubled over, unable to do any normal activities       Denies     6  PREGNANCY: "Could you be pregnant?" "Are you sexually active?" "Did you recently give birth?"      Unsure  States took Plan B about 3 days ago     7  BREASTFEEDING: "Are you breastfeeding?"      Denies     8   HORMONES: "Are you taking any hormone medications, prescription or OTC?" (e g , birth control pills, estrogen)      Denies     9  BLOOD THINNERS: "Do you take any blood thinners?" (e g , Coumadin/warfarin, Pradaxa/dabigatran, aspirin)      Denies     10  CAUSE: "What do you think is causing the bleeding?" (e g , recent gyn surgery, recent gyn procedure; known bleeding disorder, cervical cancer, polycystic ovarian disease, fibroids)          Denies     11  HEMODYNAMIC STATUS: "Are you weak or feeling lightheaded?" If so, ask: "Can you stand and walk normally?"         States yesterday after work she felt abit dizzy and nauseous after work, but has resolved  12  OTHER SYMPTOMS: "What other symptoms are you having with the bleeding?" (e g , passed tissue, vaginal discharge, fever, menstrual-type cramps)        Noted colts this am with vaginal bleeding      Protocols used: VAGINAL BLEEDING - ABNORMAL-ADULT-

## 2021-05-17 RX ORDER — VITAMIN B COMPLEX
TABLET ORAL
Qty: 30 TABLET | Refills: 2 | Status: SHIPPED | OUTPATIENT
Start: 2021-05-17

## 2021-05-28 ENCOUNTER — LAB (OUTPATIENT)
Dept: LAB | Facility: HOSPITAL | Age: 26
End: 2021-05-28
Attending: SURGERY
Payer: COMMERCIAL

## 2021-05-28 ENCOUNTER — HOSPITAL ENCOUNTER (OUTPATIENT)
Dept: RADIOLOGY | Facility: HOSPITAL | Age: 26
Discharge: HOME | End: 2021-05-28
Attending: SURGERY

## 2021-05-28 ENCOUNTER — TRANSCRIBE ORDERS (OUTPATIENT)
Dept: ADMINISTRATIVE | Facility: HOSPITAL | Age: 26
End: 2021-05-28

## 2021-05-28 ENCOUNTER — APPOINTMENT (OUTPATIENT)
Dept: LAB | Facility: HOSPITAL | Age: 26
End: 2021-05-28
Attending: SURGERY
Payer: COMMERCIAL

## 2021-05-28 DIAGNOSIS — E21.3 HYPERPARATHYROIDISM (HCC): ICD-10-CM

## 2021-05-28 LAB
ALBUMIN SERPL BCP-MCNC: 3.9 G/DL (ref 3.5–5)
ALP SERPL-CCNC: 109 U/L (ref 46–116)
ALT SERPL W P-5'-P-CCNC: 41 U/L (ref 12–78)
ANION GAP SERPL CALCULATED.3IONS-SCNC: 5 MMOL/L (ref 4–13)
AST SERPL W P-5'-P-CCNC: 17 U/L (ref 5–45)
BASOPHILS # BLD AUTO: 0.03 THOUSANDS/ΜL (ref 0–0.1)
BASOPHILS NFR BLD AUTO: 0 % (ref 0–1)
BILIRUB SERPL-MCNC: 0.84 MG/DL (ref 0.2–1)
BUN SERPL-MCNC: 13 MG/DL (ref 5–25)
CALCIUM SERPL-MCNC: 10.4 MG/DL (ref 8.3–10.1)
CHLORIDE SERPL-SCNC: 109 MMOL/L (ref 100–108)
CO2 SERPL-SCNC: 25 MMOL/L (ref 21–32)
CREAT SERPL-MCNC: 0.48 MG/DL (ref 0.6–1.3)
EOSINOPHIL # BLD AUTO: 0.16 THOUSAND/ΜL (ref 0–0.61)
EOSINOPHIL NFR BLD AUTO: 2 % (ref 0–6)
ERYTHROCYTE [DISTWIDTH] IN BLOOD BY AUTOMATED COUNT: 11.9 % (ref 11.6–15.1)
GFR SERPL CREATININE-BSD FRML MDRD: 138 ML/MIN/1.73SQ M
GLUCOSE P FAST SERPL-MCNC: 94 MG/DL (ref 65–99)
HCT VFR BLD AUTO: 43.1 % (ref 34.8–46.1)
HGB BLD-MCNC: 14.6 G/DL (ref 11.5–15.4)
IMM GRANULOCYTES # BLD AUTO: 0.02 THOUSAND/UL (ref 0–0.2)
IMM GRANULOCYTES NFR BLD AUTO: 0 % (ref 0–2)
LYMPHOCYTES # BLD AUTO: 2.55 THOUSANDS/ΜL (ref 0.6–4.47)
LYMPHOCYTES NFR BLD AUTO: 38 % (ref 14–44)
MCH RBC QN AUTO: 31.7 PG (ref 26.8–34.3)
MCHC RBC AUTO-ENTMCNC: 33.9 G/DL (ref 31.4–37.4)
MCV RBC AUTO: 94 FL (ref 82–98)
MONOCYTES # BLD AUTO: 0.56 THOUSAND/ΜL (ref 0.17–1.22)
MONOCYTES NFR BLD AUTO: 8 % (ref 4–12)
NEUTROPHILS # BLD AUTO: 3.47 THOUSANDS/ΜL (ref 1.85–7.62)
NEUTS SEG NFR BLD AUTO: 52 % (ref 43–75)
NRBC BLD AUTO-RTO: 0 /100 WBCS
PLATELET # BLD AUTO: 282 THOUSANDS/UL (ref 149–390)
PMV BLD AUTO: 9.4 FL (ref 8.9–12.7)
POTASSIUM SERPL-SCNC: 4 MMOL/L (ref 3.5–5.3)
PROT SERPL-MCNC: 7.7 G/DL (ref 6.4–8.2)
RBC # BLD AUTO: 4.61 MILLION/UL (ref 3.81–5.12)
SODIUM SERPL-SCNC: 139 MMOL/L (ref 136–145)
WBC # BLD AUTO: 6.79 THOUSAND/UL (ref 4.31–10.16)

## 2021-05-28 PROCEDURE — 36415 COLL VENOUS BLD VENIPUNCTURE: CPT

## 2021-05-28 PROCEDURE — 93005 ELECTROCARDIOGRAM TRACING: CPT

## 2021-05-28 PROCEDURE — 85025 COMPLETE CBC W/AUTO DIFF WBC: CPT

## 2021-05-28 PROCEDURE — 80053 COMPREHEN METABOLIC PANEL: CPT

## 2021-06-01 LAB
ATRIAL RATE: 82 BPM
P AXIS: 63 DEGREES
PR INTERVAL: 148 MS
QRS AXIS: 76 DEGREES
QRSD INTERVAL: 76 MS
QT INTERVAL: 364 MS
QTC INTERVAL: 425 MS
T WAVE AXIS: 74 DEGREES
VENTRICULAR RATE: 82 BPM

## 2021-06-01 PROCEDURE — 93010 ELECTROCARDIOGRAM REPORT: CPT | Performed by: INTERNAL MEDICINE

## 2021-06-02 ENCOUNTER — HOSPITAL ENCOUNTER (OUTPATIENT)
Dept: RADIOLOGY | Facility: HOSPITAL | Age: 26
Discharge: HOME | End: 2021-06-02
Payer: COMMERCIAL

## 2021-06-02 PROCEDURE — 71046 X-RAY EXAM CHEST 2 VIEWS: CPT

## 2021-06-04 NOTE — PRE-PROCEDURE INSTRUCTIONS
No outpatient medications have been marked as taking for the 6/10/21 encounter [facility] Encounter)

## 2021-06-07 ENCOUNTER — ANNUAL EXAM (OUTPATIENT)
Dept: OBGYN CLINIC | Facility: CLINIC | Age: 26
End: 2021-06-07

## 2021-06-07 ENCOUNTER — APPOINTMENT (OUTPATIENT)
Dept: LAB | Facility: HOSPITAL | Age: 26
End: 2021-06-07
Payer: COMMERCIAL

## 2021-06-07 VITALS
HEIGHT: 60 IN | BODY MASS INDEX: 21.2 KG/M2 | WEIGHT: 108 LBS | SYSTOLIC BLOOD PRESSURE: 104 MMHG | HEART RATE: 83 BPM | DIASTOLIC BLOOD PRESSURE: 69 MMHG

## 2021-06-07 DIAGNOSIS — Z11.3 SCREENING FOR STDS (SEXUALLY TRANSMITTED DISEASES): ICD-10-CM

## 2021-06-07 DIAGNOSIS — N39.0 URINARY TRACT INFECTION WITH HEMATURIA, SITE UNSPECIFIED: ICD-10-CM

## 2021-06-07 DIAGNOSIS — Z12.4 SCREENING FOR CERVICAL CANCER: ICD-10-CM

## 2021-06-07 DIAGNOSIS — Z01.419 ENCOUNTER FOR ANNUAL ROUTINE GYNECOLOGICAL EXAMINATION: Primary | ICD-10-CM

## 2021-06-07 DIAGNOSIS — Z01.419 ENCOUNTER FOR ANNUAL ROUTINE GYNECOLOGICAL EXAMINATION: ICD-10-CM

## 2021-06-07 DIAGNOSIS — R31.9 URINARY TRACT INFECTION WITH HEMATURIA, SITE UNSPECIFIED: ICD-10-CM

## 2021-06-07 LAB
BACTERIA UR QL AUTO: ABNORMAL /HPF
BILIRUB UR QL STRIP: NEGATIVE
CAOX CRY URNS QL MICRO: ABNORMAL /HPF
CLARITY UR: CLEAR
COLOR UR: ABNORMAL
GLUCOSE UR STRIP-MCNC: NEGATIVE MG/DL
HBV SURFACE AG SER QL: NORMAL
HGB UR QL STRIP.AUTO: ABNORMAL
KETONES UR STRIP-MCNC: NEGATIVE MG/DL
LEUKOCYTE ESTERASE UR QL STRIP: NEGATIVE
NITRITE UR QL STRIP: NEGATIVE
NON-SQ EPI CELLS URNS QL MICRO: ABNORMAL /HPF
PH UR STRIP.AUTO: 6 [PH]
PROT UR STRIP-MCNC: NEGATIVE MG/DL
RBC #/AREA URNS AUTO: ABNORMAL /HPF
SL AMB  POCT GLUCOSE, UA: NORMAL
SL AMB LEUKOCYTE ESTERASE,UA: NORMAL
SL AMB POCT BILIRUBIN,UA: NORMAL
SL AMB POCT BLOOD,UA: NORMAL
SL AMB POCT CLARITY,UA: CLEAR
SL AMB POCT COLOR,UA: NORMAL
SL AMB POCT KETONES,UA: NORMAL
SL AMB POCT NITRITE,UA: NORMAL
SL AMB POCT PH,UA: 6.5
SL AMB POCT SPECIFIC GRAVITY,UA: 1.02
SL AMB POCT URINE HCG: NORMAL
SL AMB POCT URINE PROTEIN: NORMAL
SL AMB POCT UROBILINOGEN: 0.2
SP GR UR STRIP.AUTO: 1.03 (ref 1–1.03)
UROBILINOGEN UR QL STRIP.AUTO: 0.2 E.U./DL
WBC #/AREA URNS AUTO: ABNORMAL /HPF

## 2021-06-07 PROCEDURE — 36415 COLL VENOUS BLD VENIPUNCTURE: CPT

## 2021-06-07 PROCEDURE — 81025 URINE PREGNANCY TEST: CPT | Performed by: OBSTETRICS & GYNECOLOGY

## 2021-06-07 PROCEDURE — 87389 HIV-1 AG W/HIV-1&-2 AB AG IA: CPT

## 2021-06-07 PROCEDURE — 87340 HEPATITIS B SURFACE AG IA: CPT

## 2021-06-07 PROCEDURE — G0145 SCR C/V CYTO,THINLAYER,RESCR: HCPCS | Performed by: OBSTETRICS & GYNECOLOGY

## 2021-06-07 PROCEDURE — 99395 PREV VISIT EST AGE 18-39: CPT | Performed by: OBSTETRICS & GYNECOLOGY

## 2021-06-07 PROCEDURE — 86592 SYPHILIS TEST NON-TREP QUAL: CPT

## 2021-06-07 PROCEDURE — 81001 URINALYSIS AUTO W/SCOPE: CPT | Performed by: OBSTETRICS & GYNECOLOGY

## 2021-06-07 PROCEDURE — 81002 URINALYSIS NONAUTO W/O SCOPE: CPT | Performed by: OBSTETRICS & GYNECOLOGY

## 2021-06-07 PROCEDURE — 87591 N.GONORRHOEAE DNA AMP PROB: CPT | Performed by: OBSTETRICS & GYNECOLOGY

## 2021-06-07 PROCEDURE — 87491 CHLMYD TRACH DNA AMP PROBE: CPT | Performed by: OBSTETRICS & GYNECOLOGY

## 2021-06-07 RX ORDER — SWAB
1 SWAB, NON-MEDICATED MISCELLANEOUS DAILY
Qty: 90 TABLET | Refills: 3 | Status: SHIPPED | OUTPATIENT
Start: 2021-06-07 | End: 2022-08-08

## 2021-06-07 NOTE — PROGRESS NOTES
Annual Visit     SUBJECTIVE:  Nichole Valencia is a 27 y o   female who presents for annual visit  Last annual visit was 6/10/2020  Today she notes increased vaginal discharge and dysuria for the last two days  In 2021 she was evaluated for headache and back pain, found to have hyperparathyroidism  She is scheduled to undergo surgery with Dr Marie Richards on 6/10/21  Today she otherwise denies the following:    breast mass, skin changes, dimpling, reddening, nipple retraction, discharge     history of breast, endometrial, ovarian, or colon cancer  Review of other systems negative  Past Medical History:   Diagnosis Date    Chronic pain     low back     Hyperparathyroidism ([facility] [address] )     Uses Guamanian as primary spoken language      She has surgery coming up on 6/10 with Dr Marie Richards for hyperparathyroidism     Past Surgical History:   Procedure Laterality Date    NO PAST SURGERIES          Current Outpatient Medications:     cholecalciferol (VITAMIN D3) 25 mcg (1,000 units) tablet, TAKE 1 TABLET BY MOUTH EVERY DAY (Patient not taking: Reported on 2021), Disp: 30 tablet, Rfl: 2    medroxyPROGESTERone (DEPO-PROVERA) 150 mg/mL injection, Inject 1 mL (150 mg total) into a muscle every 3 (three) months (Patient not taking: Reported on 3/22/2021), Disp: 1 mL, Rfl: 4    Prenatal Vit-Fe Fumarate-FA (prenatal multivitamin) 28-0 8 MG TABS, Take 1 tablet by mouth daily, Disp: 90 tablet, Rfl: 3    traMADol (ULTRAM) 50 mg tablet, Take 1 tablet (50 mg total) by mouth every 6 (six) hours as needed for moderate pain (Patient not taking: Reported on 2021), Disp: 10 tablet, Rfl: 0    Current Facility-Administered Medications:     medroxyPROGESTERone (DEPO-PROVERA) IM injection 150 mg, 150 mg, Intramuscular, Q3 Months, Josiah Ponce MD, 150 mg at 20 1322    Gyn History:  · LMP 5/15/21    · Menses are regular  · Patient is currently sexually active with 1 male partner ()  · Denies history of STIs, denies current concern for STIs  Interested in [address] testing today  · Contraception: previously on dep, last shot 2020; no further injections since then as she was having headaches  Does desire future pregnancy   · No prior gynecologic/abdominal surgeries  Obstetric History:   OB History    Para Term  AB Living   1 1   1   1   SAB TAB Ectopic Multiple Live Births         0 1      # Outcome Date GA Lbr Pradip/2nd Weight Sex Delivery Anes PTL Lv   1  10/16/16 35w3d / 00:50 2438 g (5 lb 6 oz) M Vag-Vacuum Local Y GLORY     · Family planning: interested in getting pregnant again soon; for now she wants to take care of her health and undergo surgery     Social History:   · Diet: normal diet   · Exercise: works a lot   · Work:   · ETOH use: no  · Tobacco use: no  · Recreational drug use: no  · Lives at home with  and son; sister visiting to help her after surgery  · Feels safe at home     Well-Woman Screenings:   Last Pap smear 2018, results NILM  Denies history of abnormal Pap smears   STI screening: Done last year, negative, desires today    Immunizations: Restarted Gardasil series last year, received on 6/10/20 and 10/12/20; completed series      OBJECTIVE  Vitals:    21 0929   BP: 104/69   Pulse: 83   Weight: 49 kg (108 lb)   Height: 5' (1 524 m)       Physical Exam  Vitals signs reviewed  Constitutional:       Appearance: Normal appearance  She is normal weight  She is not ill-appearing or diaphoretic  HENT:      Head: Normocephalic and atraumatic  Eyes:      Conjunctiva/sclera: Conjunctivae normal    Neck:      Musculoskeletal: Normal range of motion  Cardiovascular:      Rate and Rhythm: Normal rate  Pulmonary:      Effort: Pulmonary effort is normal  No respiratory distress  Abdominal:      General: Abdomen is flat  There is no distension  Palpations: Abdomen is soft  There is no mass  Tenderness: There is no abdominal tenderness   There is no guarding  Genitourinary:     Comments: Normal appearing external genitalia, vaginal mucosa, and cervix, with no evidence of mass, lesion, or injury  No evidence of vaginal bleeding  Normal physiologic discharge in the vaginal vault  On bimanual exam, nonenlarged anteverted, mobile uterus with no palpable adnexal masses  No CMT  Musculoskeletal: Normal range of motion  Skin:     General: Skin is warm and dry  Capillary Refill: Capillary refill takes less than 2 seconds  Neurological:      General: No focal deficit present  Mental Status: She is alert and oriented to person, place, and time  Mental status is at baseline  Psychiatric:         Mood and Affect: Mood normal          Behavior: Behavior normal          Thought Content: Thought content normal           ASSESSMENT:  Rk Maria is a 27 y o   female who presents for annual visit  Doing well  Undergoing surgery 6/10 with Dr Garland Linda  Has noted dysuria  Screenings up to date otherwise  PLAN:  - F/u pap results, STI screening   - Dysuria: UA sent; f/u results   - Start prenatal vitamins as she is considering pregnancy again;  Rx sent to pharmacy   - RTC 1 year or sooner as needed     Stephanie Lin MD  PGY-2, OBGYN  21 12:27 PM

## 2021-06-08 ENCOUNTER — TELEPHONE (OUTPATIENT)
Dept: OBGYN CLINIC | Facility: CLINIC | Age: 26
End: 2021-06-08

## 2021-06-08 LAB
C TRACH DNA SPEC QL NAA+PROBE: NEGATIVE
HIV 1+2 AB+HIV1 P24 AG SERPL QL IA: NORMAL
N GONORRHOEA DNA SPEC QL NAA+PROBE: NEGATIVE
RPR SER QL: NORMAL

## 2021-06-08 NOTE — TELEPHONE ENCOUNTER
----- Message from Allison Perkins MD sent at 6/7/2021  6:50 PM EDT -----  Urinalysis was negative except for trace blood, no other evidence of infection  Please notify patient  If she still has symptoms when she presents for surgery on 6/10, she should notify pre-op staff/ Dr Cat Lopes

## 2021-06-09 ENCOUNTER — ANESTHESIA EVENT (OUTPATIENT)
Dept: PERIOP | Facility: HOSPITAL | Age: 26
End: 2021-06-09
Payer: COMMERCIAL

## 2021-06-09 NOTE — ANESTHESIA PREPROCEDURE EVALUATION
Procedure:  PARATHYROIDECTOMY, MINIMALLY INVASIVE, right, possible four gland exploration, intraoperative PTH monitoring (Right Neck)    Relevant Problems   ENDO   (+) Hyperparathyroidism (HCC)      Chronic Pain  Physical Exam    Airway    Mallampati score: II  TM Distance: >3 FB  Neck ROM: full     Dental       Cardiovascular      Pulmonary      Other Findings        Anesthesia Plan  ASA Score- 2     Anesthesia Type- general with ASA Monitors  Additional Monitors: arterial line  Airway Plan: ETT  Plan Factors-    Chart reviewed  Induction- intravenous  Postoperative Plan-     Informed Consent- Anesthetic plan and risks discussed with patient  I personally reviewed this patient with the CRNA  Discussed and agreed on the Anesthesia Plan with the FITO White

## 2021-06-10 ENCOUNTER — TELEPHONE (OUTPATIENT)
Dept: OBGYN CLINIC | Facility: CLINIC | Age: 26
End: 2021-06-10

## 2021-06-10 ENCOUNTER — ANESTHESIA (OUTPATIENT)
Dept: PERIOP | Facility: HOSPITAL | Age: 26
End: 2021-06-10
Payer: COMMERCIAL

## 2021-06-10 ENCOUNTER — HOSPITAL ENCOUNTER (OUTPATIENT)
Facility: HOSPITAL | Age: 26
Setting detail: OUTPATIENT SURGERY
Discharge: HOME | End: 2021-06-10
Attending: SURGERY | Admitting: SURGERY
Payer: COMMERCIAL

## 2021-06-10 VITALS
HEART RATE: 84 BPM | WEIGHT: 112 LBS | SYSTOLIC BLOOD PRESSURE: 110 MMHG | DIASTOLIC BLOOD PRESSURE: 70 MMHG | BODY MASS INDEX: 21.99 KG/M2 | OXYGEN SATURATION: 99 % | RESPIRATION RATE: 16 BRPM | HEIGHT: 60 IN | TEMPERATURE: 97.8 F

## 2021-06-10 DIAGNOSIS — E21.3 HYPERPARATHYROIDISM (HCC): ICD-10-CM

## 2021-06-10 LAB
CALCIUM SERPL-MCNC: 9 MG/DL (ref 8.3–10.1)
CALCIUM SERPL-MCNC: 9.4 MG/DL (ref 8.3–10.1)
CALCIUM SERPL-MCNC: 9.4 MG/DL (ref 8.3–10.1)
EXT PREGNANCY TEST URINE: NEGATIVE
EXT. CONTROL: NORMAL
LAB AP GYN PRIMARY INTERPRETATION: NORMAL
Lab: NORMAL
PTH-INTACT SERPL-MCNC: 185.5 PG/ML (ref 18.4–80.1)
PTH-INTACT SERPL-MCNC: 22.9 PG/ML (ref 18.4–80.1)
PTH-INTACT SERPL-MCNC: 33.4 PG/ML (ref 18.4–80.1)
PTH-INTACT SERPL-MCNC: 77.3 PG/ML (ref 18.4–80.1)

## 2021-06-10 PROCEDURE — 88331 PATH CONSLTJ SURG 1 BLK 1SPC: CPT | Performed by: PATHOLOGY

## 2021-06-10 PROCEDURE — 82310 ASSAY OF CALCIUM: CPT | Performed by: SURGERY

## 2021-06-10 PROCEDURE — 88305 TISSUE EXAM BY PATHOLOGIST: CPT | Performed by: PATHOLOGY

## 2021-06-10 PROCEDURE — 60500 EXPLORE PARATHYROID GLANDS: CPT | Performed by: SURGERY

## 2021-06-10 PROCEDURE — 99024 POSTOP FOLLOW-UP VISIT: CPT | Performed by: SURGERY

## 2021-06-10 PROCEDURE — 81025 URINE PREGNANCY TEST: CPT | Performed by: SURGERY

## 2021-06-10 PROCEDURE — 83970 ASSAY OF PARATHORMONE: CPT | Performed by: SURGERY

## 2021-06-10 RX ORDER — MIDAZOLAM HYDROCHLORIDE 2 MG/2ML
INJECTION, SOLUTION INTRAMUSCULAR; INTRAVENOUS AS NEEDED
Status: DISCONTINUED | OUTPATIENT
Start: 2021-06-10 | End: 2021-06-10

## 2021-06-10 RX ORDER — MAGNESIUM HYDROXIDE 1200 MG/15ML
LIQUID ORAL AS NEEDED
Status: DISCONTINUED | OUTPATIENT
Start: 2021-06-10 | End: 2021-06-10 | Stop reason: HOSPADM

## 2021-06-10 RX ORDER — DEXAMETHASONE SODIUM PHOSPHATE 10 MG/ML
INJECTION, SOLUTION INTRAMUSCULAR; INTRAVENOUS AS NEEDED
Status: DISCONTINUED | OUTPATIENT
Start: 2021-06-10 | End: 2021-06-10

## 2021-06-10 RX ORDER — ONDANSETRON 2 MG/ML
4 INJECTION INTRAMUSCULAR; INTRAVENOUS ONCE AS NEEDED
Status: DISCONTINUED | OUTPATIENT
Start: 2021-06-10 | End: 2021-06-10 | Stop reason: HOSPADM

## 2021-06-10 RX ORDER — ROCURONIUM BROMIDE 10 MG/ML
INJECTION, SOLUTION INTRAVENOUS AS NEEDED
Status: DISCONTINUED | OUTPATIENT
Start: 2021-06-10 | End: 2021-06-10

## 2021-06-10 RX ORDER — SODIUM CHLORIDE, SODIUM LACTATE, POTASSIUM CHLORIDE, CALCIUM CHLORIDE 600; 310; 30; 20 MG/100ML; MG/100ML; MG/100ML; MG/100ML
50 INJECTION, SOLUTION INTRAVENOUS CONTINUOUS
Status: DISCONTINUED | OUTPATIENT
Start: 2021-06-10 | End: 2021-06-10 | Stop reason: HOSPADM

## 2021-06-10 RX ORDER — ACETAMINOPHEN 325 MG/1
650 TABLET ORAL EVERY 6 HOURS PRN
Status: DISCONTINUED | OUTPATIENT
Start: 2021-06-10 | End: 2021-06-10 | Stop reason: HOSPADM

## 2021-06-10 RX ORDER — METOCLOPRAMIDE HYDROCHLORIDE 5 MG/ML
10 INJECTION INTRAMUSCULAR; INTRAVENOUS ONCE AS NEEDED
Status: DISCONTINUED | OUTPATIENT
Start: 2021-06-10 | End: 2021-06-10 | Stop reason: HOSPADM

## 2021-06-10 RX ORDER — NEOSTIGMINE METHYLSULFATE 1 MG/ML
INJECTION INTRAVENOUS AS NEEDED
Status: DISCONTINUED | OUTPATIENT
Start: 2021-06-10 | End: 2021-06-10

## 2021-06-10 RX ORDER — LIDOCAINE HYDROCHLORIDE 10 MG/ML
INJECTION, SOLUTION EPIDURAL; INFILTRATION; INTRACAUDAL; PERINEURAL AS NEEDED
Status: DISCONTINUED | OUTPATIENT
Start: 2021-06-10 | End: 2021-06-10

## 2021-06-10 RX ORDER — ONDANSETRON 2 MG/ML
INJECTION INTRAMUSCULAR; INTRAVENOUS AS NEEDED
Status: DISCONTINUED | OUTPATIENT
Start: 2021-06-10 | End: 2021-06-10

## 2021-06-10 RX ORDER — FENTANYL CITRATE/PF 50 MCG/ML
50 SYRINGE (ML) INJECTION
Status: DISCONTINUED | OUTPATIENT
Start: 2021-06-10 | End: 2021-06-10 | Stop reason: HOSPADM

## 2021-06-10 RX ORDER — GLYCOPYRROLATE 0.2 MG/ML
INJECTION INTRAMUSCULAR; INTRAVENOUS AS NEEDED
Status: DISCONTINUED | OUTPATIENT
Start: 2021-06-10 | End: 2021-06-10

## 2021-06-10 RX ORDER — OXYCODONE HYDROCHLORIDE 5 MG/1
5 TABLET ORAL EVERY 4 HOURS PRN
Status: DISCONTINUED | OUTPATIENT
Start: 2021-06-10 | End: 2021-06-10 | Stop reason: HOSPADM

## 2021-06-10 RX ORDER — PROPOFOL 10 MG/ML
INJECTION, EMULSION INTRAVENOUS AS NEEDED
Status: DISCONTINUED | OUTPATIENT
Start: 2021-06-10 | End: 2021-06-10

## 2021-06-10 RX ORDER — KETOROLAC TROMETHAMINE 30 MG/ML
15 INJECTION, SOLUTION INTRAMUSCULAR; INTRAVENOUS ONCE
Status: COMPLETED | OUTPATIENT
Start: 2021-06-10 | End: 2021-06-10

## 2021-06-10 RX ORDER — BUPIVACAINE HYDROCHLORIDE 2.5 MG/ML
INJECTION, SOLUTION EPIDURAL; INFILTRATION; INTRACAUDAL AS NEEDED
Status: DISCONTINUED | OUTPATIENT
Start: 2021-06-10 | End: 2021-06-10 | Stop reason: HOSPADM

## 2021-06-10 RX ORDER — FENTANYL CITRATE 50 UG/ML
INJECTION, SOLUTION INTRAMUSCULAR; INTRAVENOUS AS NEEDED
Status: DISCONTINUED | OUTPATIENT
Start: 2021-06-10 | End: 2021-06-10

## 2021-06-10 RX ADMIN — MIDAZOLAM 2 MG: 1 INJECTION INTRAMUSCULAR; INTRAVENOUS at 07:58

## 2021-06-10 RX ADMIN — Medication 50 MCG: at 10:07

## 2021-06-10 RX ADMIN — PROPOFOL 200 MG: 10 INJECTION, EMULSION INTRAVENOUS at 08:02

## 2021-06-10 RX ADMIN — DEXAMETHASONE SODIUM PHOSPHATE 10 MG: 10 INJECTION, SOLUTION INTRAMUSCULAR; INTRAVENOUS at 08:17

## 2021-06-10 RX ADMIN — PHENYLEPHRINE HYDROCHLORIDE 300 MCG: 10 INJECTION INTRAVENOUS at 08:05

## 2021-06-10 RX ADMIN — LIDOCAINE HYDROCHLORIDE 50 MG: 10 INJECTION, SOLUTION EPIDURAL; INFILTRATION; INTRACAUDAL; PERINEURAL at 08:02

## 2021-06-10 RX ADMIN — KETOROLAC TROMETHAMINE 15 MG: 30 INJECTION, SOLUTION INTRAMUSCULAR at 10:27

## 2021-06-10 RX ADMIN — PHENYLEPHRINE HYDROCHLORIDE 300 MCG: 10 INJECTION INTRAVENOUS at 09:11

## 2021-06-10 RX ADMIN — FENTANYL CITRATE 50 MCG: 50 INJECTION INTRAMUSCULAR; INTRAVENOUS at 08:28

## 2021-06-10 RX ADMIN — ROCURONIUM BROMIDE 35 MG: 50 INJECTION, SOLUTION INTRAVENOUS at 08:02

## 2021-06-10 RX ADMIN — ONDANSETRON 4 MG: 2 INJECTION INTRAMUSCULAR; INTRAVENOUS at 09:11

## 2021-06-10 RX ADMIN — FENTANYL CITRATE 50 MCG: 50 INJECTION INTRAMUSCULAR; INTRAVENOUS at 09:39

## 2021-06-10 RX ADMIN — SODIUM CHLORIDE, SODIUM LACTATE, POTASSIUM CHLORIDE, AND CALCIUM CHLORIDE: .6; .31; .03; .02 INJECTION, SOLUTION INTRAVENOUS at 07:55

## 2021-06-10 RX ADMIN — NEOSTIGMINE METHYLSULFATE 2 MG: 1 INJECTION INTRAVENOUS at 09:31

## 2021-06-10 RX ADMIN — GLYCOPYRROLATE 0.2 MG: 0.2 INJECTION, SOLUTION INTRAMUSCULAR; INTRAVENOUS at 09:31

## 2021-06-10 NOTE — H&P
Surgical Oncology Follow Up                                        [address] [facility] [address] [address]  [phone]     Mirella Julio  1995  [id number]  [address] [facility] [place]  [address] [facility] [place] [address] [phone]          Chief Complaint   Patient presents with    Follow-up       F/U after Sestamibi scan         Assessment/Plan:     No problem-specific Assessment & Plan notes found for this encounter          Diagnoses and all orders for this visit:     Hyperparathyroidism ([address] )  -     traMADol (ULTRAM) 50 mg tablet; Take 1 tablet (50 mg total) by mouth every 6 (six) hours as needed for moderate pain  -     Case request operating room: PARATHYROIDECTOMY, MINIMALLY INVASIVE, right, MONITORING INTRAOPERATIVE PTH (PARATHYROID HORMONE); Standing  -     CBC and differential; Future  -     Comprehensive metabolic panel; Future  -     EKG 12 lead; Future  -     XR chest pa & lateral; Future     Other orders  -     Incentive spirometry; Standing  -     Insert and maintain IV line; Standing  -     Void On-Call to O R ; Standing  -     Place sequential compression device; Standing  -     PTH, intact; Standing         Advance Care Planning/Advance Directives:  Discussed disease status, cancer treatment plans and/or cancer treatment goals with the patient           Oncology History     No history exists          History of Present Illness:   Negra Kamara is a 68-year-old woman here for follow-up visit status post sestamibi scan to look for potential parathyroid target   -Interval History:  No new issues or complaints since her procedure was done      Review of Systems:  Review of Systems   Constitutional: Positive for fatigue  HENT: Negative  Eyes: Negative  Respiratory: Negative  Cardiovascular: Negative      Gastrointestinal: Negative  Endocrine: Negative  Genitourinary: Negative  Musculoskeletal: Negative  Skin: Negative  Allergic/Immunologic: Negative  Neurological: Negative  Hematological: Negative      Psychiatric/Behavioral: Negative               Patient Active Problem List   Diagnosis    Encounter for surveillance of injectable contraceptive    Herpes simplex infection    External hemorrhoids    Cystic fibrosis carrier    Hyperparathyroidism ([facility] [address] )      Medical History        Past Medical History:   Diagnosis Date    Chronic pain          Surgical History         Past Surgical History:   Procedure Laterality Date    VAGINAL DELIVERY                  Family History   Problem Relation Age of Onset    No Known Problems Mother      No Known Problems Father      No Known Problems Sister      No Known Problems Brother      No Known Problems Son      No Known Problems Sister      No Known Problems Sister        Social History               Socioeconomic History    Marital status: /Civil Union       Spouse name: Not on file    Number of children: Not on file    Years of education: Not on file    Highest education level: Not on file   Occupational History    Occupation: restaurant   Social Needs    Financial resource strain: Not hard at all   Gabriella-Ella insecurity       Worry: Never true       Inability: Never true    Transportation needs       Medical: No       Non-medical: No   Tobacco Use    Smoking status: Never Smoker    Smokeless tobacco: Never Used   Substance and Sexual Activity    Alcohol use: Never       Frequency: Never       Binge frequency: Never    Drug use: Never    Sexual activity: Yes       Partners: Male       Birth control/protection: Injection   Lifestyle    Physical activity       Days per week: 0 days       Minutes per session: 0 min    Stress: Not at all   Relationships    Social connections       Talks on phone: Once a week       Gets together: Once a week       Attends Congregation service: Never       Active member of club or organization: No       Attends meetings of clubs or organizations: Never       Relationship status:     Intimate partner violence       Fear of current or ex partner: No       Emotionally abused: No       Physically abused: No       Forced sexual activity: No   Other Topics Concern    Not on file   Social History Narrative    Not on file           Current Outpatient Medications:     cholecalciferol (VITAMIN D3) 1,000 units tablet, Take 1 tablet (1,000 Units total) by mouth daily, Disp: 30 tablet, Rfl: 2    cyclobenzaprine (FLEXERIL) 5 mg tablet, Take 1/2 to 1 pill HS for muscle tension/ headache (Patient not taking: Reported on 2/8/2021), Disp: 10 tablet, Rfl: 0    medroxyPROGESTERone (DEPO-PROVERA) 150 mg/mL injection, Inject 1 mL (150 mg total) into a muscle every 3 (three) months (Patient not taking: Reported on 3/22/2021), Disp: 1 mL, Rfl: 4    traMADol (ULTRAM) 50 mg tablet, Take 1 tablet (50 mg total) by mouth every 6 (six) hours as needed for moderate pain, Disp: 10 tablet, Rfl: 0     Current Facility-Administered Medications:     medroxyPROGESTERone (DEPO-PROVERA) IM injection 150 mg, 150 mg, Intramuscular, Q3 Months, Rock Herrera MD, 150 mg at 12/21/20 1322  No Known Allergies      Vitals:   /71   Pulse 79   Temp (!) 97 °F (36 1 °C) (Temporal)   Resp 16   Ht 5' (1 524 m)   Wt 50 8 kg (112 lb)   LMP 05/15/2021   SpO2 98%   Breastfeeding No   BMI 21 87 kg/m²     Physical Exam  Constitutional:       Appearance: Normal appearance  HENT:      Head: Normocephalic and atraumatic  Right Ear: External ear normal       Left Ear: External ear normal       Nose: Nose normal    Eyes:      General: No scleral icterus  Extraocular Movements: Extraocular movements intact  Pupils: Pupils are equal, round, and reactive to light  Neck:      Musculoskeletal: Normal range of motion  No neck rigidity  Cardiovascular:      Rate and Rhythm: Normal rate and regular rhythm  Pulses: Normal pulses  Heart sounds: Normal heart sounds  Pulmonary:      Effort: Pulmonary effort is normal       Breath sounds: Normal breath sounds  Abdominal:      General: Abdomen is flat  Palpations: Abdomen is soft  Musculoskeletal: Normal range of motion  Lymphadenopathy:      Cervical: No cervical adenopathy  Skin:     General: Skin is warm and dry  Neurological:      General: No focal deficit present  Mental Status: She is alert and oriented to person, place, and time  Psychiatric:         Mood and Affect: Mood normal          Behavior: Behavior normal          Thought Content: Thought content normal          Judgment: Judgment normal                Results:  Labs:        Lab Results   Component Value Date      4 (H) 01/28/2021     CALCIUM 11 3 (H) 01/28/2021            Imaging  Nm Parathyroid Scan W Spect     Result Date: 4/5/2021  Narrative: PARATHYROID SCAN INDICATION:  E21 3: Hyperparathyroidism, unspecified COMPARISON:  None  TECHNIQUE:   Following the intravenous administration of 26 1 mCi Tc-99m Cardiolite, anterior and bilateral anterior oblique projection images of the neck and mediastinum were obtained at approximately 10 minutes post injection followed at 2 hours post injection by static anterior and bilateral oblique projections as well as SPECT images in coronal, sagittal and axial projections  FINDINGS: Static images demonstrate slightly greater radiotracer uptake in the right lobe of the thyroid gland compared to the left  Delayed images demonstrate washout of thyroid gland activity  Residual focus of radiotracer uptake noted in the right lower pole region  SPECT images demonstrate focal radiotracer uptake in the right lower pole region  This appears to be at the posterior aspect of the thyroid gland       Impression: 1    Focal radiotracer uptake in the right lower pole region suspicious for parathyroid adenoma  Workstation performed: [id number]      I reviewed the above laboratory and imaging data      Discussion/Summary:  80-year-old woman, primary hyperparathyroidism  She has a target on the right side  She is amenable to minimally Invasive right parathyroidectomy, possible 4 gland exploration with intraoperative PTH monitoring  Rationale for this along with risks and benefits of surgery including infection, bleeding, need for possible additional surgery, discussed with her and her   They understand the plan wish to proceed as outlined

## 2021-06-10 NOTE — DISCHARGE INSTRUCTIONS
Paratiroidectomía   LO QUE NECESITA SABER:   La paratiroidectomía es [place] para extirpar deb parte o la totalidad de las glándulas paratiroides  La paratiroides está compuesta de 4 glándulas pequeñas que por lo general se encuentran cerca de la glándula tiroides  Las glándulas paratiroides producen deb hormona que controla la cantidad de calcio en la [place] [facility]:   Medicamentos:  Es posible que usted necesite alguno de los siguientes:  · AINEs (Analgésicos antiinflamatorios no esteroides)  pueden reducir la inflamación y el dolor  Joe medicamento se puede comprar con o sin receta médica  Joe medicamento puede causar sangrado estomacal o problemas en los riñones en ciertas personas  Si usted dino un medicamento anticoagulante, siempre pregúntele a marie médico si los GERARD son seguros para usted  Ellyn siempre la etiqueta y siga cuidadosamente las instrucciones antes de usar joe medicamento  · El acetaminofén  [employer]  Está disponible sin receta médica  Pregunte la cantidad y la frecuencia con que debe tomarlos  [address]  El acetaminofén puede causar daño en el hígado cuando no se dino de forma correcta  · [place] sheila medicamentos julia se le haya indicado  Consulte con marie médico si usted linda que marie medicamento no le está ayudando o si presenta efectos secundarios  Infórmele si es alérgico a cualquier medicamento  Mantenga deb lista actualizada de los [place], las vitaminas y los productos herbales que dino  Incluya los siguientes datos de los medicamentos: cantidad, frecuencia y motivo de administración  Traiga con usted la lista o los envases de la píldoras a sheila citas de seguimiento  Lleve la lista de los medicamentos con usted en ashlee de deb emergencia    Programe deb shannen con marie médico o cirujano julia se le indique:  Usted tendrá que regresar a hacerse exámenes, revisión de marie incisión, y marie drenaje, o para que se remuevan sheila puntos de sutura  Usted podría ser referido a un endocrinólogo  Anote sheila preguntas para que se acuerde de hacerlas mauri sheila visitas  Cuidado de la herida:  Siga las instrucciones de marie médico sobre el cuidado de sheila heridas  Es posible que tenga que kenisha cuidadosamente la herida con agua y [place]  Seque el área y póngale vendajes nuevos y limpios julia le indicaron  Cambie sheila vendajes cuando se mojen o ensucien  Revise marie drenaje cuando se cambian los vendajes  No extraiga el drenaje hacia fuera  Solicite más información acerca de cómo cuidar marie drenaje  Descanse tanto julia le sea necesario:  Empiece a hacer un poco más día a día  Regrese a sheila actividades diarias julia se le haya indicado  [place] suplementos julia se le indique:  Usted podría necesitar medicamentos de calcio para mantener marie nivel normal de calcio en la chelsi  También puede ayudar a prevenir y tratar la pérdida de hueso  Marie médico también podría indicarle que tome vitamina D para ayudar a marie cuerpo a absorber el calcio  Comuníquese con marie médico o cirujano si:   · Usted tiene fiebre  · Marie herida está lesley, inflamada o drena pus  · Usted tiene dolor en la riri del tanja que no desaparece o empeora aun después de chester medicamento para el dolor  · Usted tiene escalofríos, tos o se siente débil y adolorido  · Usted tiene náuseas o está vomitando  · Usted tiene preguntas o inquietudes acerca de marie condición o cuidado  Busque atención médica de inmediato o llame al 911 si:   · Usted expectora chelsi  · Usted se siente mareada, le hace falta el aire y tiene dolor de [place]  · Marie brazo o pierna se siente caliente, sensible y [place]  Se podría reinaldo inflamado y mckeon  · Usted tiene dificultad para tragar o hablar, o se queda sin voz  · Usted se siente ansioso, asustado e inquieto      · Usted tiene los siguientes síntomas de bajo nivel de calcio en la chelsi:     ¨ Confusión    ¨ Cansancio    ¨ Espasmos musculares o rigidez muscular    ¨ Entumecimiento u hormigueo alrededor de la ryan, las lucy o los pies    ¨ Yazmin Ora convulsión  © 2017 [address] Information is for End User's use only and may not be sold, redistributed or otherwise used for commercial purposes  All illustrations and images included in CareNotes® are the copyrighted property of A D A M , Inc  or Thor Castillo  Esta información es sólo para uso en educación  Donato intención no es darle un consejo médico sobre enfermedades o tratamientos  Colsulte con donato Burlene Bourbon farmacéutico antes de seguir cualquier régimen médico para saber si es seguro y efectivo para usted

## 2021-06-10 NOTE — ANESTHESIA POSTPROCEDURE EVALUATION
Post-Op Assessment Note    CV Status:  Stable    Pain management: adequate     Mental Status:  Alert and awake   Hydration Status:  Euvolemic   PONV Controlled:  Controlled   Airway Patency:  Patent      Post Op Vitals Reviewed: Yes      Staff: CRNA   Comments: vss report rn        No complications documented      /69 (06/10/21 0945)    Temp (!) 97 °F (36 1 °C) (06/10/21 0945)    Pulse 77 (06/10/21 0945)   Resp 12 (06/10/21 0945)    SpO2 99 % (06/10/21 0945)

## 2021-06-10 NOTE — PERIOPERATIVE NURSING NOTE
Calcium drawn from right a-line and sent to lab  Neck is slightly swollen but soft, Dr Aleisha Elizalde and Debi Hurtado at bedside and aware, will continue to monitor, ice pack applied

## 2021-06-10 NOTE — TELEPHONE ENCOUNTER
----- Message from Darío Mayo MD sent at 6/10/2021  1:50 PM EDT -----  Pt pap was normal  Repeat Pap in 3 years

## 2021-06-10 NOTE — PERIOPERATIVE NURSING NOTE
Neck swelling has improved, pt resting quietly, report called, no questions, pt transferred to [facility]

## 2021-06-10 NOTE — OP NOTE
OPERATIVE REPORT  PATIENT NAME: Iva Gary    :  1995  MRN: [id number]  Pt Location:  OR ROOM 07    SURGERY DATE: 6/10/2021    Surgeon(s) and Role:     * Herbie Ritter MD - Primary     * Char Cordova MD - Assisting    Preop Diagnosis:  Hyperparathyroidism ([facility] [address] ) [E21 3]    Post-Op Diagnosis Codes:     * Hyperparathyroidism ([facility] [address] ) [E21 3]    Procedure(s) (LRB):  Right minimally invasive parathyroidectomy; intraoperative PTH monitoring (Right)    Specimen(s):  ID Type Source Tests Collected by Time Destination   1 : Right inferior parathyroid -collected at 0850 Tissue Parathyroid TISSUE EXAM Herbie Ritter MD 6/10/2021 0751        Estimated Blood Loss:   Minimal    Drains:  * No LDAs found *    Anesthesia Type:   General    Operative Indications:  Hyperparathyroidism ([facility] [address] ) [E21 3]      Operative Findings:  820n mg right parathyroid adenoma  Component      Latest Ref Rng & Units 6/10/2021 6/10/2021 6/10/2021 6/10/2021           6:29 AM  8:52 AM  8:57 AM  9:03 AM   PARATHYROID HORMONE      18 4 - 80 1 pg/mL 185 5 (H) 77 3 46 7 39 4     Complications:   None    Procedure and Technique:  The patient was brought to the OR and identified by proper time-out  Following this she was intubated by the anesthesia team, then was prepped and draped with the neck exposed in the extended position  Local anesthesia was given, then a 3 cm incision was made sharply 2 finger breaths above the sternal notch  Cautery was used to dissect through the dermis subcutaneous tissue  Wheatlanner retractor was placed  The platysma was then transected with cautery  Strap muscles were then divided the midline  This exposed the surface of the thyroid  We dissected the strap muscles off the anterolateral aspect of the right thyroid lobe  This enabled us to dissect between the thyroid and the strap muscles   The thyroid gland was then retracted medially and anteriorly which exposed what appeared to be a large parathyroid gland along the posterior aspect of the thyroid gland  This was dissected out from surrounding tissue in hemostatic fashion with cautery  Vascular pedicle was visualized  The pedicle was clipped  PTH levels were drawn at the start of the case, 0 min, 5 min, and 10 min after the case  Levels decreased appropriately to normal range upon 10 min post resection of the gland  We then irrigated the field and closed using 3-0 Vicryl to close the strap muscles the midline followed by 3-0 Vicryl to close the platysma, followed by 4-0 Vicryl close the dermis, followed by 5-0 Monocryl to close skin in subcuticular fashion  Benzoin and Steri-Strips were then applied in the usual fashion  The patient was awakened transferred to the recovery room in stable condition     I was present for the entire procedure    Patient Disposition:  PACU     SIGNATURE: Robi Nguyen MD  DATE: Shantell 10, 2021  TIME: 9:36 AM

## 2021-06-10 NOTE — TELEPHONE ENCOUNTER
----- Message from Dre Salgado MD sent at 6/10/2021  7:19 AM EDT -----  G/C swab negative  Please notify patient

## 2021-06-10 NOTE — NURSING NOTE
Pt tearful; c/o pain at the R Radial A-line site, I repositioned the patients arm/wrist but the patient continues to cry in pain  A-line flushes and draws back blood without difficulty  No redness or swelling around site  I offered to remove A-line and draw blood work peripherally going forward, patient stated they would like to leave the a-line in for blood work  Will continue to monitor

## 2021-06-24 PROBLEM — E89.2 STATUS POST PARATHYROIDECTOMY (HCC): Status: ACTIVE | Noted: 2021-06-24

## 2021-06-24 PROBLEM — Z98.890 STATUS POST PARATHYROIDECTOMY: Status: ACTIVE | Noted: 2021-06-24

## 2021-06-24 PROBLEM — Z90.89 STATUS POST PARATHYROIDECTOMY: Status: ACTIVE | Noted: 2021-06-24

## 2021-06-28 ENCOUNTER — OFFICE VISIT (OUTPATIENT)
Dept: SURGICAL ONCOLOGY | Facility: CLINIC | Age: 26
End: 2021-06-28

## 2021-06-28 VITALS
OXYGEN SATURATION: 98 % | SYSTOLIC BLOOD PRESSURE: 100 MMHG | RESPIRATION RATE: 16 BRPM | DIASTOLIC BLOOD PRESSURE: 66 MMHG | BODY MASS INDEX: 21.87 KG/M2 | HEART RATE: 74 BPM | HEIGHT: 60 IN | TEMPERATURE: 97.7 F

## 2021-06-28 DIAGNOSIS — E89.2 STATUS POST PARATHYROIDECTOMY (HCC): Primary | ICD-10-CM

## 2021-06-28 PROCEDURE — 99024 POSTOP FOLLOW-UP VISIT: CPT | Performed by: SURGERY

## 2021-06-28 NOTE — PROGRESS NOTES
Surgical Oncology Follow Up       [address] [facility] [place]  [address] [address] [place] [address] [phone]    Erasmo Sanya  1995  [id number]  [address] [facility] [place] Temple  [address] [place] [id number] [phone]    Chief Complaint   Patient presents with    Post-op       Assessment/Plan:    No problem-specific Assessment & Plan notes found for this encounter  Diagnoses and all orders for this visit:    Status post parathyroidectomy ([facility] [address] )  -     Calcium; Future  -     PTH, intact; Future        Advance Care Planning/Advance Directives:  Discussed disease status, cancer treatment plans and/or cancer treatment goals with the patient  Oncology History    No history exists  History of Present Illness: Patient is a 27year old woman here for a postop check s/p parathyroidectomy  -Interval History: Since surgery, her energy levels have improved  No trouble swallowing and breathing  Review of Systems:  Review of Systems   Constitutional: Negative  HENT: Negative  Eyes: Negative  Respiratory: Negative  Cardiovascular: Negative  Gastrointestinal: Negative  Endocrine: Negative  Genitourinary: Negative  Musculoskeletal: Negative  Skin: Negative  Allergic/Immunologic: Negative  Neurological: Negative  Hematological: Negative  Psychiatric/Behavioral: Negative  All other systems reviewed and are negative        Patient Active Problem List   Diagnosis    Encounter for surveillance of injectable contraceptive    Herpes simplex infection    External hemorrhoids    Cystic fibrosis carrier    Hyperparathyroidism ([address] )    Status post parathyroidectomy ([facility] [address] )     Past Medical History:   Diagnosis Date    Chronic pain     low back     Hyperparathyroidism ([address] )     Uses Faroese as primary spoken language      Past Surgical History:   Procedure Laterality Date    NO PAST SURGERIES      SC EXPLORE PARATHYROID GLANDS Right 6/10/2021    Procedure: Right minimally invasive parathyroidectomy; intraoperative PTH monitoring;  Surgeon: Rubina Galicia MD;  [facility];  Service: Surgical Oncology     Family History   Problem Relation Age of Onset    No Known Problems Mother     No Known Problems Father     No Known Problems Sister     No Known Problems Brother     No Known Problems Son     No Known Problems Sister     No Known Problems Sister      Social History     Socioeconomic History    Marital status: /Civil Union     Spouse name: Not on file    Number of children: Not on file    Years of education: Not on file    Highest education level: Not on file   Occupational History    Occupation: restaurant   Tobacco Use    Smoking status: Never Smoker    Smokeless tobacco: Never Used   Vaping Use    Vaping Use: Never used   Substance and Sexual Activity    Alcohol use: Never    Drug use: Never    Sexual activity: Yes     Partners: Male     Birth control/protection: None   Other Topics Concern    Not on file   Social History Narrative    Not on file     Social Determinants of Health     Financial Resource Strain: Low Risk     Difficulty of Paying Living Expenses: Not hard at all   Food Insecurity: No Food Insecurity    Worried About Running Out of Food in the Last Year: Never true    [place] of Food in the Last Year: Never true   Transportation Needs: No Transportation Needs    Lack of Transportation (Medical): No    Lack of Transportation (Non-Medical):  No   Physical Activity: Inactive    Days of Exercise per Week: 0 days    Minutes of Exercise per Session: 0 min   Stress: No Stress Concern Present    Feeling of Stress : Not at all   Social Connections: Socially Isolated    Frequency of Communication with Friends and Family: Once a week    Frequency of Social Gatherings with Friends and Family: Once a week    Attends Church Services: Never    Active Member of Clubs or Organizations: No    Attends Club or Organization Meetings: Never    Marital Status:    Intimate Partner Violence: Not At Risk    Fear of Current or Ex-Partner: No    Emotionally Abused: No    Physically Abused: No    Sexually Abused: No       Current Outpatient Medications:     cholecalciferol (VITAMIN D3) 25 mcg (1,000 units) tablet, TAKE 1 TABLET BY MOUTH EVERY DAY (Patient not taking: Reported on 6/7/2021), Disp: 30 tablet, Rfl: 2    medroxyPROGESTERone (DEPO-PROVERA) 150 mg/mL injection, Inject 1 mL (150 mg total) into a muscle every 3 (three) months (Patient not taking: Reported on 3/22/2021), Disp: 1 mL, Rfl: 4    Prenatal Vit-Fe Fumarate-FA (prenatal multivitamin) 28-0 8 MG TABS, Take 1 tablet by mouth daily (Patient not taking: Reported on 6/28/2021), Disp: 90 tablet, Rfl: 3    traMADol (ULTRAM) 50 mg tablet, Take 1 tablet (50 mg total) by mouth every 6 (six) hours as needed for moderate pain (Patient not taking: Reported on 6/7/2021), Disp: 10 tablet, Rfl: 0    Current Facility-Administered Medications:     medroxyPROGESTERone (DEPO-PROVERA) IM injection 150 mg, 150 mg, Intramuscular, Q3 Months, Esther Sampson MD, 150 mg at 12/21/20 1322  No Known Allergies  Vitals:    06/28/21 1553   BP: 100/66   Pulse: 74   Resp: 16   Temp: 97 7 °F (36 5 °C)   SpO2: 98%       Physical Exam  Constitutional:       Appearance: Normal appearance  Neck:      Comments: Incision c/d/i  Musculoskeletal:      Cervical back: Normal range of motion and neck supple  No rigidity or tenderness  Lymphadenopathy:      Cervical: No cervical adenopathy  Neurological:      Mental Status: She is alert             Results:  Labs:  Lab Results   Component Value Date    PTH 22 9 06/10/2021    CALCIUM 9 0 06/10/2021         Imaging  XR chest pa & lateral    Result Date: 6/5/2021  Narrative: CHEST INDICATION:   E21 3: Hyperparathyroidism, unspecified  COMPARISON:  None  EXAM PERFORMED/VIEWS:  XR CHEST PA & LATERAL  FINDINGS: Cardiomediastinal silhouette normal  Lungs clear  No effusion or pneumothorax  Osseous structures normal for age  Impression: No acute cardiopulmonary disease  Workstation performed: [id number]     I reviewed the above laboratory and imaging data  Discussion/Summary: S/p parathyroidectomy  Doing well  Follow-up in 6 months to assess for durability of operation

## 2021-06-28 NOTE — LETTER
June 28, 2021     Bryce Kasper PA-C  [address] [facility]    Patient: Jie Baker   YOB: 1995   Date of Visit: 6/28/2021       Dear Dr Rea Mason: Thank you for referring Gerard Monreal to me for evaluation  Below are my notes for this consultation  If you have questions, please do not hesitate to call me  I look forward to following your patient along with you  Sincerely,        Christina Pryor MD        CC: MD Christina Null MD  6/28/2021  4:33 PM  Sign when Signing Visit     Surgical Oncology Follow Up       [address] [facility] [address] [id number] [phone]    Jie Baker  1995  [id number]  [address] [facility] Rohit Monday  [address] [facility] [address]  [phone]    Chief Complaint   Patient presents with    Post-op       Assessment/Plan:    No problem-specific Assessment & Plan notes found for this encounter  Diagnoses and all orders for this visit:    Status post parathyroidectomy ([address] )  -     Calcium; Future  -     PTH, intact; Future        Advance Care Planning/Advance Directives:  Discussed disease status, cancer treatment plans and/or cancer treatment goals with the patient  Oncology History    No history exists  History of Present Illness: Patient is a 27year old woman here for a postop check s/p parathyroidectomy  -Interval History: Since surgery, her energy levels have improved  No trouble swallowing and breathing  Review of Systems:  Review of Systems   Constitutional: Negative  HENT: Negative  Eyes: Negative  Respiratory: Negative  Cardiovascular: Negative  Gastrointestinal: Negative  Endocrine: Negative  Genitourinary: Negative  Musculoskeletal: Negative  Skin: Negative  Allergic/Immunologic: Negative  Neurological: Negative  Hematological: Negative  Psychiatric/Behavioral: Negative  All other systems reviewed and are negative        Patient Active Problem List   Diagnosis    Encounter for surveillance of injectable contraceptive    Herpes simplex infection    External hemorrhoids    Cystic fibrosis carrier    Hyperparathyroidism ([facility] [address] )    Status post parathyroidectomy ([facility] [address] )     Past Medical History:   Diagnosis Date    Chronic pain     low back     Hyperparathyroidism ([facility] [address] )     Uses Setswana as primary spoken language      Past Surgical History:   Procedure Laterality Date    NO PAST SURGERIES      [place] EXPLORE PARATHYROID GLANDS Right 6/10/2021    Procedure: Right minimally invasive parathyroidectomy; intraoperative PTH monitoring;  Surgeon: Alton Angel MD;  Location: [facility] OR;  Service: Surgical Oncology     Family History   Problem Relation Age of Onset    No Known Problems Mother     No Known Problems Father     No Known Problems Sister     No Known Problems Brother     No Known Problems Son     No Known Problems Sister     No Known Problems Sister      Social History     Socioeconomic History    Marital status: /Civil Union     Spouse name: Not on file    Number of children: Not on file    Years of education: Not on file    Highest education level: Not on file   Occupational History    Occupation: restaurant   Tobacco Use    Smoking status: Never Smoker    Smokeless tobacco: Never Used   Vaping Use    Vaping Use: Never used   Substance and Sexual Activity    Alcohol use: Never    Drug use: Never    Sexual activity: Yes     Partners: Male     Birth control/protection: None   Other Topics Concern    Not on file   Social History Narrative    Not on file     Social Determinants of Health     Financial Resource Strain: Low Risk     Difficulty of Paying Living Expenses: Not hard at all   Food Insecurity: No Food Insecurity    Worried About [address] in the Last Year: Never true   [address] in the Last Year: Never true   Transportation Needs: No Transportation Needs    Lack of Transportation (Medical): No    Lack of Transportation (Non-Medical):  No   Physical Activity: Inactive    Days of Exercise per Week: 0 days    Minutes of Exercise per Session: 0 min   Stress: No Stress Concern Present    Feeling of Stress : Not at all   Social Connections: Socially Isolated    Frequency of Communication with Friends and Family: Once a week    Frequency of Social Gatherings with Friends and Family: Once a week    Attends Christian Services: Never    Active Member of Clubs or Organizations: No    Attends Club or Organization Meetings: Never    Marital Status:    Intimate Partner Violence: Not At Risk    Fear of Current or Ex-Partner: No    Emotionally Abused: No    Physically Abused: No    Sexually Abused: No       Current Outpatient Medications:     cholecalciferol (VITAMIN D3) 25 mcg (1,000 units) tablet, TAKE 1 TABLET BY MOUTH EVERY DAY (Patient not taking: Reported on 6/7/2021), Disp: 30 tablet, Rfl: 2    medroxyPROGESTERone (DEPO-PROVERA) 150 mg/mL injection, Inject 1 mL (150 mg total) into a muscle every 3 (three) months (Patient not taking: Reported on 3/22/2021), Disp: 1 mL, Rfl: 4    Prenatal Vit-Fe Fumarate-FA (prenatal multivitamin) 28-0 8 MG TABS, Take 1 tablet by mouth daily (Patient not taking: Reported on 6/28/2021), Disp: 90 tablet, Rfl: 3    traMADol (ULTRAM) 50 mg tablet, Take 1 tablet (50 mg total) by mouth every 6 (six) hours as needed for moderate pain (Patient not taking: Reported on 6/7/2021), Disp: 10 tablet, Rfl: 0    Current Facility-Administered Medications:     medroxyPROGESTERone (DEPO-PROVERA) IM injection 150 mg, 150 mg, Intramuscular, Q3 Months, John Gardner MD, 150 mg at 12/21/20 1322  No Known Allergies  Vitals:    06/28/21 1553   BP: 100/66   Pulse: 74   Resp: 16   Temp: 97 7 °F (36 5 °C)   SpO2: 98% Physical Exam  Constitutional:       Appearance: Normal appearance  Neck:      Comments: Incision c/d/i  Musculoskeletal:      Cervical back: Normal range of motion and neck supple  No rigidity or tenderness  Lymphadenopathy:      Cervical: No cervical adenopathy  Neurological:      Mental Status: She is alert  Results:  Labs:  Lab Results   Component Value Date    PTH 22 9 06/10/2021    CALCIUM 9 0 06/10/2021         Imaging  XR chest pa & lateral    Result Date: 6/5/2021  Narrative: CHEST INDICATION:   E21 3: Hyperparathyroidism, unspecified  COMPARISON:  None  EXAM PERFORMED/VIEWS:  XR CHEST PA & LATERAL  FINDINGS: Cardiomediastinal silhouette normal  Lungs clear  No effusion or pneumothorax  Osseous structures normal for age  Impression: No acute cardiopulmonary disease  Workstation performed: [id number]     I reviewed the above laboratory and imaging data  Discussion/Summary: S/p parathyroidectomy  Doing well  Follow-up in 6 months to assess for durability of operation

## 2021-07-07 ENCOUNTER — CONSULT (OUTPATIENT)
Dept: MULTI SPECIALTY CLINIC | Facility: CLINIC | Age: 26
End: 2021-07-07

## 2021-07-07 VITALS
HEART RATE: 80 BPM | BODY MASS INDEX: 21.2 KG/M2 | SYSTOLIC BLOOD PRESSURE: 104 MMHG | HEIGHT: 60 IN | TEMPERATURE: 97.1 F | WEIGHT: 108 LBS | DIASTOLIC BLOOD PRESSURE: 69 MMHG

## 2021-07-07 DIAGNOSIS — E21.3 HYPERPARATHYROIDISM (HCC): ICD-10-CM

## 2021-07-07 PROCEDURE — 99213 OFFICE O/P EST LOW 20 MIN: CPT | Performed by: INTERNAL MEDICINE

## 2021-07-07 NOTE — PROGRESS NOTES
Endocrinology consult     Assessment/Plan:    No problem-specific Assessment & Plan notes found for this encounter  Diagnoses and all orders for this visit:    Hyperparathyroidism [facility])  Primary hyperparathyroidism secondary to right lower parathyroid adenoma status post right minimally invasive parathyroidectomy on 06/10 by Dr Thierry Henry  Doing well, no complains  Calcium and PTH within normal limits   Continue to monitor levels regularly- will see her in 6 months   Recommended genetic counseling for screening for MEN as patient developed parathyroid adenoma at age < 27 yrs  Provided patients and  with informations of [facility] genetic counseling at [facility]  -     Ambulatory referral to Endocrinology      Follow up in 6 months    Subjective:      Patient ID: Romina Avery is a 27 y o  female  HPI    Darinel Velez is a 27years old patient of seen today for consultation for hyperparathyroidism  Initially patient was having multiple complaints including back pain and headaches  Workup revealed hypercalcemia which led to additional workup  PTH was found to be elevated  24 hour urine calcium was normal  No history of kidney stones or family hx of endocrine diseases  Nuclear parathyroid scan revealed focal radiotracer uptake in the right lower pole region suspicious for parathyroid adenoma  She underwent right minimally invasive parathyroidectomy on 06/10/2021  Since surgery her levels have improved  Denies any symptoms today  The following portions of the patient's history were reviewed and updated as appropriate: allergies, current medications, past family history, past medical history, past social history, past surgical history and problem list     Review of Systems   Constitutional: Negative for chills and fever  HENT: Negative for ear pain and sore throat  Eyes: Negative for pain and visual disturbance  Respiratory: Negative for cough and shortness of breath      Cardiovascular: Negative for chest pain and palpitations  Gastrointestinal: Negative for abdominal pain and vomiting  Genitourinary: Negative for dysuria and hematuria  Musculoskeletal: Negative for arthralgias and back pain  Skin: Negative for color change and rash  Neurological: Negative for seizures and syncope  All other systems reviewed and are negative  Objective:      /69 (BP Location: Left arm, Patient Position: Sitting, Cuff Size: Adult)   Pulse 80   Temp (!) 97 1 °F (36 2 °C) (Temporal)   Ht 5' (1 524 m)   Wt 49 kg (108 lb)   BMI 21 09 kg/m²          Physical Exam  Vitals and nursing note reviewed  Constitutional:       General: She is not in acute distress  Appearance: Normal appearance  She is not ill-appearing or toxic-appearing  HENT:      Head: Normocephalic and atraumatic  Neck:      Comments: Clean surgical scar   Cardiovascular:      Rate and Rhythm: Normal rate and regular rhythm  Heart sounds: Normal heart sounds  No murmur heard  No gallop  Pulmonary:      Effort: Pulmonary effort is normal  No respiratory distress  Breath sounds: Normal breath sounds  No wheezing or rales  Abdominal:      General: There is no distension  Palpations: Abdomen is soft  Tenderness: There is no abdominal tenderness  Musculoskeletal:         General: No swelling or tenderness  Cervical back: Normal range of motion and neck supple  No tenderness  Right lower leg: No edema  Left lower leg: No edema  Skin:     General: Skin is warm  Neurological:      General: No focal deficit present  Mental Status: She is alert and oriented to person, place, and time     Psychiatric:         Mood and Affect: Mood normal          Behavior: Behavior normal          Past Medical History:   Diagnosis Date    Chronic pain     low back     Hyperparathyroidism ([address] )     Uses Rwandan as primary spoken language        Past Surgical History:   Procedure Laterality Date    NO PAST SURGERIES      SD EXPLORE PARATHYROID GLANDS Right 6/10/2021    Procedure: Right minimally invasive parathyroidectomy; intraoperative PTH monitoring;  Surgeon: Eliel Will MD;  [facility];  Service: Surgical Oncology     Current Outpatient Medications   Medication Instructions    cholecalciferol (VITAMIN D3) 25 mcg (1,000 units) tablet TAKE 1 TABLET BY MOUTH EVERY DAY    medroxyPROGESTERone (DEPO-PROVERA) 150 mg, Intramuscular, Every 3 months    Prenatal Vit-Fe Fumarate-FA (prenatal multivitamin) 28-0 8 MG TABS 1 tablet, Oral, Daily    traMADol (ULTRAM) 50 mg, Oral, Every 6 hours PRN       Manny Ring MD  Internal Medicine Residency   PGY-3

## 2021-08-17 ENCOUNTER — OFFICE VISIT (OUTPATIENT)
Dept: INTERNAL MEDICINE CLINIC | Facility: CLINIC | Age: 26
End: 2021-08-17

## 2021-08-17 VITALS
DIASTOLIC BLOOD PRESSURE: 73 MMHG | TEMPERATURE: 98 F | HEART RATE: 73 BPM | SYSTOLIC BLOOD PRESSURE: 94 MMHG | WEIGHT: 106 LBS | BODY MASS INDEX: 20.81 KG/M2 | HEIGHT: 60 IN

## 2021-08-17 DIAGNOSIS — Z23 NEED FOR COVID-19 VACCINE: ICD-10-CM

## 2021-08-17 DIAGNOSIS — E89.2 STATUS POST PARATHYROIDECTOMY (HCC): ICD-10-CM

## 2021-08-17 DIAGNOSIS — E21.3 HYPERPARATHYROIDISM (HCC): Primary | ICD-10-CM

## 2021-08-17 PROCEDURE — 99213 OFFICE O/P EST LOW 20 MIN: CPT | Performed by: PHYSICIAN ASSISTANT

## 2021-08-17 RX ORDER — LORATADINE 10 MG
1 TABLET ORAL DAILY
COMMUNITY
Start: 2021-06-20 | End: 2022-08-08

## 2021-08-17 NOTE — PATIENT INSTRUCTIONS
On your visit today we reviewed that you have followed up with your surgeon and endocrinology after your diagnosis of hyperparathyroidism and undergoing surgical removal of your parathyroid glands in June of this year  As per discussion we confirm that you are feeling very well have no new medical concerns today  You are aware that your follow-up labs show normal parathyroid levels  You do have an order to repeat your labs and are scheduled for follow-up with the surgeon as well as the endocrinologist in January of 2022  We also reviewed you are aware of the recommendation for referral to genetic counseling secondary to developing a parathyroid adenoma at such a young age  New referral provided today and you will be given the phone number to set up that appointment to discuss screening for risk factors for multiple endocrine neoplasia  You are aware this is not because we think you have this condition just increased risk factors and would like you to have genetic counseling  We did discuss you have not yet received her COVID vaccine but are agreeable and information has been provided today for locations  Will plan on follow-up in 1 year for your annual but aware to contact our office if you have any questions or concerns

## 2021-08-17 NOTE — PROGRESS NOTES
Assessment/Plan: On your visit today we reviewed that you have followed up with your surgeon and endocrinology after your diagnosis of hyperparathyroidism and undergoing surgical removal of your parathyroid glands in June of this year  As per discussion we confirm that you are feeling very well have no new medical concerns today  You are aware that your follow-up labs show normal parathyroid levels  You do have an order to repeat your labs and are scheduled for follow-up with the surgeon as well as the endocrinologist in January of 2022  We also reviewed you are aware of the recommendation for referral to genetic counseling secondary to developing a parathyroid adenoma at such a young age  New referral provided today and you will be given the phone number to set up that appointment to discuss screening for risk factors for multiple endocrine neoplasia  You are aware this is not because we think you have this condition just increased risk factors and would like you to have genetic counseling  We did discuss you have not yet received her COVID vaccine but are agreeable and information has been provided today for locations  Will plan on follow-up in 1 year for your annual but aware to contact our office if you have any questions or concerns  No problem-specific Assessment & Plan notes found for this encounter  Diagnoses and all orders for this visit:    Hyperparathyroidism [facility])  -     Ambulatory Referral to Oncology Genetics; Future    Status post parathyroidectomy [facility])  -     Ambulatory Referral to Oncology Genetics; Future    Need for COVID-19 vaccine    Other orders  -     Prenatal Vit-Fe Fumarate-FA ([facility] Pren[facility]) 27-0 8 MG TABS; Take 1 tablet by mouth daily (Patient not taking: Reported on 8/17/2021)          Subjective:      Patient ID: José Miguel Munson is a 27 y o  female   # [id number]      Patient presents today for routine follow-up       As noted patient as part of testing was found to have hyperparathyroidism  Scan did reveal uptake in right lower pole region suspicious for adenoma  Patient was referred to surgical oncologist and underwent parathyroidectomy Shantell 10, 2021  Patient has been following with surgeon and recently established with endocrinology  Patient reports that she is feeling very well and also aware that her labs showed normal levels of her parathyroid  Patient is currently scheduled to repeat labs and follow up with the surgeon as well as endocrinology in January 2022  did review with patient the note from the endocrinologist on recommendation for genetic counseling and screening for Ambien as patient developed parathyroid adenoma under the age of 29  Patient states she does recall that information but requesting new contact information to schedule with the genetic counselor  Has not received COVID vaccine yet no issues is agreeable to scheduling        The following portions of the patient's history were reviewed and updated as appropriate: allergies, current medications, past family history, past medical history, past social history, past surgical history and problem list     Review of Systems   Constitutional: Negative  Negative for fatigue and unexpected weight change  HENT: Negative  Respiratory: Negative  Negative for cough and shortness of breath  Cardiovascular: Negative  Gastrointestinal: Negative  Negative for constipation  Endocrine: Negative  Negative for cold intolerance, heat intolerance, polydipsia, polyphagia and polyuria  Genitourinary: Negative for flank pain and urgency  No kidney stones   Musculoskeletal: Negative  Negative for arthralgias  Neurological: Negative  Negative for headaches  Psychiatric/Behavioral: Negative            Objective:      BP 94/73 (BP Location: Left arm, Patient Position: Sitting, Cuff Size: Adult)   Pulse 73   Temp 98 °F (36 7 °C) (Temporal) Ht 5' (1 524 m)   Wt 48 1 kg (106 lb)   BMI 20 70 kg/m²          Physical Exam  Vitals and nursing note reviewed  Constitutional:       Appearance: Normal appearance  HENT:      Mouth/Throat:      Mouth: Mucous membranes are moist       Pharynx: Oropharynx is clear  Eyes:      Extraocular Movements: Extraocular movements intact  Conjunctiva/sclera: Conjunctivae normal    Neck:      Vascular: No carotid bruit  Cardiovascular:      Rate and Rhythm: Normal rate and regular rhythm  Pulmonary:      Effort: Pulmonary effort is normal       Breath sounds: Normal breath sounds  Musculoskeletal:      Cervical back: Neck supple  No tenderness  Skin:     General: Skin is warm and dry  Comments: Well-healed anterior cervical incisional scar status post parathyroidectomy  Neurological:      General: No focal deficit present  Mental Status: She is alert  Psychiatric:         Mood and Affect: Mood normal          Thought Content:  Thought content normal

## 2021-08-24 ENCOUNTER — TELEPHONE (OUTPATIENT)
Dept: HEMATOLOGY ONCOLOGY | Facility: CLINIC | Age: 26
End: 2021-08-24

## 2021-08-24 NOTE — TELEPHONE ENCOUNTER
Genetics New Patient Intake Form   Patient Details   Santa Gupta     1995     [id number]     Background Information   Who is Calling to Schedule? Patient   Name, if not patient    Referring Provider Tonya Melvin PA-C   Is this a personal or family history? Personal    What type of Cancer is it? Hyperparathyroidism   Status post parathyroidectomy    Is the referral marked STAT? no   Was the patient diagnosed with cancer within the last 3 months? no   Does the patient have metastatic disease? no   Will the result of a genetic test affect your surgical treatment? No   If the patient is pending surgery Needs to be scheduled within 48 hours  If none available, email STAT Cancer Genetics   If the patient is metastatic or newly diagnosed Needs to be scheduled within 2 weeks  If none available, email STAT Cancer Genetics   If no appointment is available, schedule then email Stat Cancer Genetics   Have you had genetic testing that showed a positive genetic mutation? (If yes, schedule within 2 weeks or email STAT cancer genetics) No   Has your family member had genetic testing that resulted in a positive genetic mutation? (If yes, and in the last 6 months schedule within 3 weeks  If over 6 months schedule as usual) No   Scheduling Information   Preferred [facility]   Are there any dates patient cannot be seen?  No   Miscellaneous   (If the patient did not schedule in the next available spot and wanted later, please add why here)    After completing the above information, please route to the  Oncology [facility]

## 2021-10-27 ENCOUNTER — OFFICE VISIT (OUTPATIENT)
Dept: OBGYN CLINIC | Facility: CLINIC | Age: 26
End: 2021-10-27

## 2021-10-27 VITALS
BODY MASS INDEX: 20.42 KG/M2 | WEIGHT: 104 LBS | SYSTOLIC BLOOD PRESSURE: 114 MMHG | HEART RATE: 76 BPM | DIASTOLIC BLOOD PRESSURE: 76 MMHG | HEIGHT: 60 IN

## 2021-10-27 DIAGNOSIS — N91.2 AMENORRHEA: ICD-10-CM

## 2021-10-27 DIAGNOSIS — N89.8 VAGINAL DISCHARGE: Primary | ICD-10-CM

## 2021-10-27 DIAGNOSIS — B37.3 VULVOVAGINAL CANDIDIASIS: ICD-10-CM

## 2021-10-27 DIAGNOSIS — Z11.3 SCREEN FOR STD (SEXUALLY TRANSMITTED DISEASE): ICD-10-CM

## 2021-10-27 LAB
BV WHIFF TEST VAG QL: NEGATIVE
CLUE CELLS SPEC QL WET PREP: NEGATIVE
PH SMN: 4 [PH]
SL AMB POCT URINE HCG: NEGATIVE
SL AMB POCT WET MOUNT: ABNORMAL
T VAGINALIS VAG QL WET PREP: NEGATIVE
YEAST VAG QL WET PREP: POSITIVE

## 2021-10-27 PROCEDURE — 87210 SMEAR WET MOUNT SALINE/INK: CPT | Performed by: NURSE PRACTITIONER

## 2021-10-27 PROCEDURE — 81025 URINE PREGNANCY TEST: CPT | Performed by: NURSE PRACTITIONER

## 2021-10-27 PROCEDURE — 99213 OFFICE O/P EST LOW 20 MIN: CPT | Performed by: NURSE PRACTITIONER

## 2021-10-27 RX ORDER — FLUCONAZOLE 150 MG/1
150 TABLET ORAL ONCE
Qty: 1 TABLET | Refills: 0 | Status: SHIPPED | OUTPATIENT
Start: 2021-10-27 | End: 2021-10-27

## 2022-01-05 ENCOUNTER — OFFICE VISIT (OUTPATIENT)
Dept: MULTI SPECIALTY CLINIC | Facility: CLINIC | Age: 27
End: 2022-01-05

## 2022-01-05 VITALS
HEART RATE: 76 BPM | SYSTOLIC BLOOD PRESSURE: 108 MMHG | WEIGHT: 105.4 LBS | DIASTOLIC BLOOD PRESSURE: 71 MMHG | TEMPERATURE: 98.2 F | BODY MASS INDEX: 20.58 KG/M2

## 2022-01-05 DIAGNOSIS — E21.3 HYPERPARATHYROIDISM (HCC): Primary | ICD-10-CM

## 2022-01-05 PROCEDURE — 99214 OFFICE O/P EST MOD 30 MIN: CPT | Performed by: INTERNAL MEDICINE

## 2022-01-05 NOTE — PROGRESS NOTES
[facility]  Endocrinology OFFICE VISIT     PATIENT INFORMATION     Lita Barlow   27 y o  female   MRN: [id number]    ASSESSMENT/PLAN     Diagnoses and all orders for this visit:    Hyperparathyroidism [facility])        - The patient underwent right lower parathyroidectomy on 06/10/2021  She is continuing to follow-up with Surgical Oncology  She is also currently following [facility] due to the possibility of an underlying hereditary endocrine disorder (ie  MEN)  Will order laboratory studies to further workup he underlying cause of the patient's hyperparathyroidism         - Calcium; Future        - PTH; Future  -     TSH + Free T4; Future  -     Prolactin; Future  -     Vitamin D 25 hydroxy; Future  -     Comprehensive metabolic panel; Future  -     Phosphorus; Future  -     Gastrin; Future    Schedule a follow-up appointment in 6 months  HEALTH MAINTENANCE     Immunization History   Administered Date(s) Administered    Fluzone Split Quad 0 5 mL 10/11/2016    HPV9 05/22/2018, 06/10/2020, 10/12/2020    INFLUENZA 10/11/2016    Influenza Quadrivalent Preservative Free 3 years and older IM 10/11/2016    Tdap 10/11/2016     CHIEF COMPLAINT     Chief Complaint   Patient presents with    Follow-up      HISTORY OF PRESENT ILLNESS     The patient is a 74-year-old female with a past medical history of hyperparathyroidism status post parathyroidectomy in June of 2021 who presented to the clinic today for a follow-up visit  Overall today the patient was feeling well  She is currently following with [facility] counseling as well as with [facility] surgical oncology  Her next appointment is on the 13th of this month  Her last laboratory studies were obtained on 06/10/2021 and showed a calcium of 9 0 and a PTH of 22 9    The patient stated that prior to her surgery she was experiencing headaches, fatigue, body aches, constipation, difficulty sleeping and irregular menses  Since undergoing the procedure a majority of her symptoms have improved  She still experiences mild constipation, difficulty sleeping and irregular menses  However, overall she states that she feels much better and prior to the procedure  She denies any recent fevers, chills, chest pain, shortness of breath or abdominal pain  Otherwise the patient had no other acute complaints  REVIEW OF SYSTEMS     Review of Systems   Constitutional: Negative for activity change, appetite change, chills, diaphoresis, fatigue and fever  HENT: Negative for congestion, ear discharge, ear pain, hearing loss, sinus pressure, sinus pain and sore throat  Eyes: Negative for pain, discharge, redness and itching  Respiratory: Negative for cough, chest tightness, shortness of breath and wheezing  Cardiovascular: Negative for chest pain, palpitations and leg swelling  Gastrointestinal: Negative for abdominal distention, abdominal pain, blood in stool, constipation, diarrhea, nausea and vomiting  Genitourinary: Negative for difficulty urinating, dysuria, flank pain and frequency  Musculoskeletal: Negative for arthralgias, back pain and gait problem  Skin: Negative for color change, pallor and rash  Neurological: Negative for dizziness, weakness, light-headedness, numbness and headaches  Psychiatric/Behavioral: Negative for agitation, behavioral problems, confusion and decreased concentration  OBJECTIVE     Vitals:    01/05/22 0808   BP: 108/71   BP Location: Left arm   Patient Position: Sitting   Cuff Size: Standard   Pulse: 76   Temp: 98 2 °F (36 8 °C)   TempSrc: Temporal   Weight: 47 8 kg (105 lb 6 4 oz)     Physical Exam  Constitutional:       Appearance: She is well-developed  HENT:      Head: Normocephalic and atraumatic  Nose: Nose normal    Eyes:      Conjunctiva/sclera: Conjunctivae normal       Pupils: Pupils are equal, round, and reactive to light     Neck:      Vascular: No JVD  Cardiovascular:      Rate and Rhythm: Normal rate and regular rhythm  Heart sounds: Normal heart sounds  No murmur heard  No friction rub  No gallop  Pulmonary:      Effort: Pulmonary effort is normal  No respiratory distress  Breath sounds: Normal breath sounds  No stridor  No wheezing or rales  Chest:      Chest wall: No tenderness  Abdominal:      General: Bowel sounds are normal  There is no distension  Palpations: Abdomen is soft  There is no mass  Tenderness: There is no abdominal tenderness  There is no guarding or rebound  Hernia: No hernia is present  Musculoskeletal:         General: No tenderness or deformity  Right lower leg: No edema  Left lower leg: No edema  Skin:     General: Skin is warm and dry  Neurological:      Mental Status: She is alert and oriented to person, place, and time  Psychiatric:         Behavior: Behavior normal          Thought Content:  Thought content normal          Judgment: Judgment normal        CURRENT MEDICATIONS     Current Outpatient Medications:     cholecalciferol (VITAMIN D3) 25 mcg (1,000 units) tablet, TAKE 1 TABLET BY MOUTH EVERY DAY (Patient not taking: Reported on 6/7/2021), Disp: 30 tablet, Rfl: 2    medroxyPROGESTERone (DEPO-PROVERA) 150 mg/mL injection, Inject 1 mL (150 mg total) into a muscle every 3 (three) months (Patient not taking: Reported on 3/22/2021), Disp: 1 mL, Rfl: 4    Prenatal Vit-Fe Fumarate-FA ([facility] Pren[facility]) 27-0 8 MG TABS, Take 1 tablet by mouth daily (Patient not taking: Reported on 8/17/2021), Disp: , Rfl:     Prenatal Vit-Fe Fumarate-FA (prenatal multivitamin) 28-0 8 MG TABS, Take 1 tablet by mouth daily (Patient not taking: Reported on 6/28/2021), Disp: 90 tablet, Rfl: 3    PAST MEDICAL & SURGICAL HISTORY     Past Medical History:   Diagnosis Date    Chronic pain     low back     Hyperparathyroidism ([address] )     Uses Ivorian as primary spoken language      Past Surgical History:   Procedure Laterality Date    NO PAST SURGERIES      NC EXPLORE PARATHYROID GLANDS Right 6/10/2021    Procedure: Right minimally invasive parathyroidectomy; intraoperative PTH monitoring;  Surgeon: Kamila Peterson MD;  [facility];  Service: Surgical Oncology     SOCIAL & FAMILY HISTORY     Social History     Socioeconomic History    Marital status: /Civil Union     Spouse name: Not on file    Number of children: Not on file    Years of education: Not on file    Highest education level: Not on file   Occupational History    Occupation: restaurant   Tobacco Use    Smoking status: Never Smoker    Smokeless tobacco: Never Used   Vaping Use    Vaping Use: Never used   Substance and Sexual Activity    Alcohol use: Never    Drug use: Never    Sexual activity: Yes     Partners: Male     Birth control/protection: None   Other Topics Concern    Not on file   Social History Narrative    Not on file     Social Determinants of Health     Financial Resource Strain: Low Risk     Difficulty of Paying Living Expenses: Not hard at all   Food Insecurity: No Food Insecurity    Worried About Running Out of Food in the Last Year: Never true    [place] of Food in the Last Year: Never true   Transportation Needs: No Transportation Needs    Lack of Transportation (Medical): No    Lack of Transportation (Non-Medical):  No   Physical Activity: Inactive    Days of Exercise per Week: 0 days    Minutes of Exercise per Session: 0 min   Stress: No Stress Concern Present    Feeling of Stress : Not at all   Social Connections: Socially Isolated    Frequency of Communication with Friends and Family: Once a week    Frequency of Social Gatherings with Friends and Family: Once a week    Attends Protestant Services: Never    Active Member of Clubs or Organizations: No    Attends Club or Organization Meetings: Never    Marital Status:    Intimate Partner Violence: Not At Risk    Fear of Current or Ex-Partner: No    Emotionally Abused: No    Physically Abused: No    Sexually Abused: No   Housing Stability: Not on file     Social History     Substance and Sexual Activity   Alcohol Use Never       Social History     Substance and Sexual Activity   Drug Use Never     Social History     Tobacco Use   Smoking Status Never Smoker   Smokeless Tobacco Never Used     Family History   Problem Relation Age of Onset    No Known Problems Mother     No Known Problems Father     No Known Problems Sister     No Known Problems Brother     No Known Problems Son     No Known Problems Sister     No Known Problems Sister      ==  Derrick Alston MD   Resident, PGY-2  [place] [address] Internal Medicine [facility]  [address] , [address] [address] [facility] [address] [facility]  Office: [phone]  Fax: [phone]

## 2022-01-11 ENCOUNTER — APPOINTMENT (OUTPATIENT)
Dept: LAB | Facility: HOSPITAL | Age: 27
End: 2022-01-11
Attending: SURGERY
Payer: MEDICARE

## 2022-01-11 DIAGNOSIS — E89.2 STATUS POST PARATHYROIDECTOMY (HCC): ICD-10-CM

## 2022-01-11 LAB
CALCIUM SERPL-MCNC: 9.5 MG/DL (ref 8.3–10.1)
PTH-INTACT SERPL-MCNC: 67.5 PG/ML (ref 18.4–80.1)

## 2022-01-11 PROCEDURE — 82310 ASSAY OF CALCIUM: CPT

## 2022-01-11 PROCEDURE — 36415 COLL VENOUS BLD VENIPUNCTURE: CPT

## 2022-01-11 PROCEDURE — 83970 ASSAY OF PARATHORMONE: CPT

## 2022-01-13 ENCOUNTER — OFFICE VISIT (OUTPATIENT)
Dept: SURGICAL ONCOLOGY | Facility: CLINIC | Age: 27
End: 2022-01-13
Payer: MEDICARE

## 2022-01-13 VITALS
HEART RATE: 68 BPM | DIASTOLIC BLOOD PRESSURE: 70 MMHG | OXYGEN SATURATION: 97 % | HEIGHT: 60 IN | RESPIRATION RATE: 16 BRPM | TEMPERATURE: 96.8 F | WEIGHT: 104 LBS | SYSTOLIC BLOOD PRESSURE: 116 MMHG | BODY MASS INDEX: 20.42 KG/M2

## 2022-01-13 DIAGNOSIS — E89.2 STATUS POST PARATHYROIDECTOMY (HCC): Primary | ICD-10-CM

## 2022-01-13 PROCEDURE — 99213 OFFICE O/P EST LOW 20 MIN: CPT | Performed by: SURGERY

## 2022-01-13 NOTE — PROGRESS NOTES
Surgical Oncology Follow Up       [facility] [place]  [address] [facility] [id number] [phone]    Herber Goodrich  1995  [id number]  [facility] [place]  [address] [facility] [address]  [phone]    Chief Complaint   Patient presents with    Follow-up     6 month f/u       Assessment/Plan:    No problem-specific Assessment & Plan notes found for this encounter  Diagnoses and all orders for this visit:    Status post parathyroidectomy [facility])        Advance Care Planning/Advance Directives:  Discussed disease status, cancer treatment plans and/or cancer treatment goals with the patient  Oncology History    No history exists  History of Present Illness:  Patient is a 27 old woman here for follow-up status post parathyroidectomy approximately 6 months ago   -Interval History: No new complaints to report  Since surgery, she feels less tired  Review of Systems:  Review of Systems   Constitutional: Negative  HENT: Negative  Eyes: Negative  Respiratory: Negative  Cardiovascular: Negative  Gastrointestinal: Negative  Endocrine: Negative  Genitourinary: Negative  Musculoskeletal: Negative  Skin: Negative  Allergic/Immunologic: Negative  Neurological: Negative  Hematological: Negative  Psychiatric/Behavioral: Negative          Patient Active Problem List   Diagnosis    Encounter for surveillance of injectable contraceptive    Herpes simplex infection    External hemorrhoids    Cystic fibrosis carrier    Hyperparathyroidism ([address] )    Status post parathyroidectomy ([address] )     Past Medical History:   Diagnosis Date    Chronic pain     low back     Hyperparathyroidism ([address] )     Uses Bulgarian as primary spoken language      Past Surgical History:   Procedure Laterality Date    NO PAST SURGERIES      NC EXPLORE PARATHYROID GLANDS Right 6/10/2021    Procedure: Right minimally invasive parathyroidectomy; intraoperative PTH monitoring;  Surgeon: Wenceslao Paulino MD;  [facility];  Service: Surgical Oncology     Family History   Problem Relation Age of Onset    No Known Problems Mother     No Known Problems Father     No Known Problems Sister     No Known Problems Brother     No Known Problems Son     No Known Problems Sister     No Known Problems Sister      Social History     Socioeconomic History    Marital status: /Civil Union     Spouse name: Not on file    Number of children: Not on file    Years of education: Not on file    Highest education level: Not on file   Occupational History    Occupation: restaurant   Tobacco Use    Smoking status: Never Smoker    Smokeless tobacco: Never Used   Vaping Use    Vaping Use: Never used   Substance and Sexual Activity    Alcohol use: Never    Drug use: Never    Sexual activity: Yes     Partners: Male     Birth control/protection: None   Other Topics Concern    Not on file   Social History Narrative    Not on file     Social Determinants of Health     Financial Resource Strain: Low Risk     Difficulty of Paying Living Expenses: Not hard at all   Food Insecurity: No Food Insecurity    Worried About Running Out of Food in the Last Year: Never true    [place] of Food in the Last Year: Never true   Transportation Needs: No Transportation Needs    Lack of Transportation (Medical): No    Lack of Transportation (Non-Medical):  No   Physical Activity: Inactive    Days of Exercise per Week: 0 days    Minutes of Exercise per Session: 0 min   Stress: No Stress Concern Present    Feeling of Stress : Not at all   Social Connections: Socially Isolated    Frequency of Communication with Friends and Family: Once a week    Frequency of Social Gatherings with Friends and Family: Once a week    Attends Hoahaoism Services: Never    Active Member of Clubs or Organizations: No    Attends Club or Organization Meetings: Never    Marital Status:    Intimate Partner Violence: Not At Risk    Fear of Current or Ex-Partner: No    Emotionally Abused: No    Physically Abused: No    Sexually Abused: No   Housing Stability: Not on file       Current Outpatient Medications:     cholecalciferol (VITAMIN D3) 25 mcg (1,000 units) tablet, TAKE 1 TABLET BY MOUTH EVERY DAY (Patient not taking: Reported on 6/7/2021), Disp: 30 tablet, Rfl: 2    medroxyPROGESTERone (DEPO-PROVERA) 150 mg/mL injection, Inject 1 mL (150 mg total) into a muscle every 3 (three) months (Patient not taking: Reported on 3/22/2021), Disp: 1 mL, Rfl: 4    Prenatal Vit-Fe Fumarate-FA ([facility] Prenatal) 27-0 8 MG TABS, Take 1 tablet by mouth daily (Patient not taking: Reported on 8/17/2021 ), Disp: , Rfl:     Prenatal Vit-Fe Fumarate-FA (prenatal multivitamin) 28-0 8 MG TABS, Take 1 tablet by mouth daily (Patient not taking: Reported on 6/28/2021), Disp: 90 tablet, Rfl: 3  No Known Allergies  Vitals:    01/13/22 1558   Resp: 16       Physical Exam  Vitals reviewed  Constitutional:       Appearance: Normal appearance  HENT:      Head: Normocephalic and atraumatic  Right Ear: External ear normal       Left Ear: External ear normal    Eyes:      General: No scleral icterus  Extraocular Movements: Extraocular movements intact  Pupils: Pupils are equal, round, and reactive to light  Cardiovascular:      Rate and Rhythm: Normal rate and regular rhythm  Heart sounds: Normal heart sounds  Pulmonary:      Effort: Pulmonary effort is normal       Breath sounds: Normal breath sounds  Abdominal:      General: Abdomen is flat  Bowel sounds are normal       Palpations: Abdomen is soft  Skin:     General: Skin is warm and dry  Neurological:      General: No focal deficit present  Mental Status: She is alert and oriented to person, place, and time     Psychiatric:         Mood and Affect: Mood normal          Behavior: Behavior normal          Thought Content: Thought content normal          Judgment: Judgment normal            Results:  Labs:  Lab Results   Component Value Date    PTH 67 5 01/11/2022    CALCIUM 9 5 01/11/2022         Imaging  No results found  I reviewed the above laboratory and imaging data  Discussion/Summary:  Status post parathyroidectomy  She is doing well  Will follow-up on a p r n  Basis

## 2022-01-13 NOTE — LETTER
January 13, 2022     Liz Rea PA-C    Patient: Mat Kirby   YOB: 1995   Date of Visit: 1/13/2022       Dear Dr Kait Álvarez: Thank you for referring Kennedy Hicks to me for evaluation  Below are my notes for this consultation  If you have questions, please do not hesitate to call me  I look forward to following your patient along with you  Sincerely,        Masha Neal MD        CC: MD Masha Dyson MD  1/13/2022  4:28 PM  Sign when Signing Visit     Surgical Oncology Follow Up       [address] [facility] [address] [address]  [phone]    Mat Braneswon  1995  [id number]  [address] [place] [address] [facility] [place] [id number] [facility] [address]  [phone]    Chief Complaint   Patient presents with    Follow-up     6 month f/u       Assessment/Plan:    No problem-specific Assessment & Plan notes found for this encounter  Diagnoses and all orders for this visit:    Status post parathyroidectomy [facility])        Advance Care Planning/Advance Directives:  Discussed disease status, cancer treatment plans and/or cancer treatment goals with the patient  Oncology History    No history exists  History of Present Illness:  Patient is a 27 old woman here for follow-up status post parathyroidectomy approximately 6 months ago   -Interval History: No new complaints to report  Since surgery, she feels less tired  Review of Systems:  Review of Systems   Constitutional: Negative  HENT: Negative  Eyes: Negative  Respiratory: Negative  Cardiovascular: Negative  Gastrointestinal: Negative  Endocrine: Negative  Genitourinary: Negative  Musculoskeletal: Negative  Skin: Negative  Allergic/Immunologic: Negative  Neurological: Negative  Hematological: Negative      Psychiatric/Behavioral: Negative          Patient Active Problem List   Diagnosis    Encounter for surveillance of injectable contraceptive    Herpes simplex infection    External hemorrhoids    Cystic fibrosis carrier    [facility]parathyroidism ([facility] )    Status post parathyroidectomy ([facility] )     Past Medical History:   Diagnosis Date    Chronic pain     low back     Hyperparathyroidism ([facility] )     Uses Malay as primary spoken language      Past Surgical History:   Procedure Laterality Date    NO PAST SURGERIES      CT EXPLORE PARATHYROID GLANDS Right 6/10/2021    Procedure: Right minimally invasive parathyroidectomy; intraoperative PTH monitoring;  Surgeon: Joe Rizvi MD;  [facility];  Service: Surgical Oncology     Family History   Problem Relation Age of Onset    No Known Problems Mother     No Known Problems Father     No Known Problems Sister     No Known Problems Brother     No Known Problems Son     No Known Problems Sister     No Known Problems Sister      Social History     Socioeconomic History    Marital status: /Civil Union     Spouse name: Not on file    Number of children: Not on file    Years of education: Not on file    Highest education level: Not on file   Occupational History    Occupation: restaurant   Tobacco Use    Smoking status: Never Smoker    Smokeless tobacco: Never Used   Vaping Use    Vaping Use: Never used   Substance and Sexual Activity    Alcohol use: Never    Drug use: Never    Sexual activity: Yes     Partners: Male     Birth control/protection: None   Other Topics Concern    Not on file   Social History Narrative    Not on file     Social Determinants of Health     Financial Resource Strain: Low Risk     Difficulty of Paying Living Expenses: Not hard at all   Food Insecurity: No Food Insecurity    Worried About [address] in the Last Year: Never true    [place] of Food in the Last Year: Never true   Transportation Needs: No Transportation Needs    Lack of Transportation (Medical): No    Lack of Transportation (Non-Medical): No   Physical Activity: Inactive    Days of Exercise per Week: 0 days    Minutes of Exercise per Session: 0 min   Stress: No Stress Concern Present    Feeling of Stress : Not at all   Social Connections: Socially Isolated    Frequency of Communication with Friends and Family: Once a week    Frequency of Social Gatherings with Friends and Family: Once a week    Attends Tenriism Services: Never    Active Member of Clubs or Organizations: No    Attends Club or Organization Meetings: Never    Marital Status:    Intimate Partner Violence: Not At Risk    Fear of Current or Ex-Partner: No    Emotionally Abused: No    Physically Abused: No    Sexually Abused: No   Housing Stability: Not on file       Current Outpatient Medications:     cholecalciferol (VITAMIN D3) 25 mcg (1,000 units) tablet, TAKE 1 TABLET BY MOUTH EVERY DAY (Patient not taking: Reported on 6/7/2021), Disp: 30 tablet, Rfl: 2    medroxyPROGESTERone (DEPO-PROVERA) 150 mg/mL injection, Inject 1 mL (150 mg total) into a muscle every 3 (three) months (Patient not taking: Reported on 3/22/2021), Disp: 1 mL, Rfl: 4    Prenatal Vit-Fe Fumarate-FA ([facility] Prenatal) 27-0 8 MG TABS, Take 1 tablet by mouth daily (Patient not taking: Reported on 8/17/2021 ), Disp: , Rfl:     Prenatal Vit-Fe Fumarate-FA (prenatal multivitamin) 28-0 8 MG TABS, Take 1 tablet by mouth daily (Patient not taking: Reported on 6/28/2021), Disp: 90 tablet, Rfl: 3  No Known Allergies  Vitals:    01/13/22 1558   Resp: 16       Physical Exam  Vitals reviewed  Constitutional:       Appearance: Normal appearance  HENT:      Head: Normocephalic and atraumatic  Right Ear: External ear normal       Left Ear: External ear normal    Eyes:      General: No scleral icterus  Extraocular Movements: Extraocular movements intact  Pupils: Pupils are equal, round, and reactive to light  Cardiovascular:      Rate and Rhythm: Normal rate and regular rhythm  Heart sounds: Normal heart sounds  Pulmonary:      Effort: Pulmonary effort is normal       Breath sounds: Normal breath sounds  Abdominal:      General: Abdomen is flat  Bowel sounds are normal       Palpations: Abdomen is soft  Skin:     General: Skin is warm and dry  Neurological:      General: No focal deficit present  Mental Status: She is alert and oriented to person, place, and time  Psychiatric:         Mood and Affect: Mood normal          Behavior: Behavior normal          Thought Content: Thought content normal          Judgment: Judgment normal            Results:  Labs:  Lab Results   Component Value Date    PTH 67 5 01/11/2022    CALCIUM 9 5 01/11/2022         Imaging  No results found  I reviewed the above laboratory and imaging data  Discussion/Summary:  Status post parathyroidectomy  She is doing well  Will follow-up on a p r n  Basis

## 2022-01-18 ENCOUNTER — TELEPHONE (OUTPATIENT)
Dept: SURGICAL ONCOLOGY | Facility: CLINIC | Age: 27
End: 2022-01-18

## 2022-01-18 ENCOUNTER — TELEPHONE (OUTPATIENT)
Dept: GENETICS | Facility: CLINIC | Age: 27
End: 2022-01-18

## 2022-01-18 NOTE — TELEPHONE ENCOUNTER
Reviewed with Tammy and Marely Costello       Spoke with patient, genetics appointment rescheduled for 8/9 at 9:00 am with Marely Costello

## 2022-01-18 NOTE — TELEPHONE ENCOUNTER
Patient's  called into [facility] to cancel patient's upcoming genetics appointment on 1/20 at 10:00 am, they are unable to attend due to work  I offered switching to televisit, they are unable too either  I spoke to patient I explained to patient I will review with Genetic Counselor based on rescheduling her, I collected a fhx : gila stout with [facility]  No other cancer

## 2022-02-22 ENCOUNTER — TELEPHONE (OUTPATIENT)
Dept: OTHER | Facility: OTHER | Age: 27
End: 2022-02-22

## 2022-02-22 NOTE — TELEPHONE ENCOUNTER
Patient called to schedule a new patient appointment  She took a pregnancy test last night and it said negative but this morning when she checked it again, it said positive  She did it again and the same thing happened  She has not had her period since 12/15/21  Patient is requesting a call back for appointment and preferred language is Liberian

## 2022-03-15 ENCOUNTER — TELEPHONE (OUTPATIENT)
Dept: INTERNAL MEDICINE CLINIC | Facility: CLINIC | Age: 27
End: 2022-03-15

## 2022-03-15 NOTE — TELEPHONE ENCOUNTER
Patient advised Dr Dashawn Barrera no longer works here and she agreed to be added to our recall list

## 2022-05-23 ENCOUNTER — APPOINTMENT (OUTPATIENT)
Dept: LAB | Facility: HOSPITAL | Age: 27
End: 2022-05-23
Payer: MEDICARE

## 2022-05-23 DIAGNOSIS — E21.3 HYPERPARATHYROIDISM (HCC): ICD-10-CM

## 2022-05-23 LAB
25(OH)D3 SERPL-MCNC: 16.6 NG/ML (ref 30–100)
ALBUMIN SERPL BCP-MCNC: 3.7 G/DL (ref 3.5–5)
ALP SERPL-CCNC: 49 U/L (ref 46–116)
ALT SERPL W P-5'-P-CCNC: 29 U/L (ref 12–78)
ANION GAP SERPL CALCULATED.3IONS-SCNC: 3 MMOL/L (ref 4–13)
AST SERPL W P-5'-P-CCNC: 17 U/L (ref 5–45)
BILIRUB SERPL-MCNC: 0.68 MG/DL (ref 0.2–1)
BUN SERPL-MCNC: 17 MG/DL (ref 5–25)
CALCIUM SERPL-MCNC: 8.8 MG/DL (ref 8.3–10.1)
CHLORIDE SERPL-SCNC: 111 MMOL/L (ref 100–108)
CO2 SERPL-SCNC: 23 MMOL/L (ref 21–32)
CREAT SERPL-MCNC: 0.69 MG/DL (ref 0.6–1.3)
GFR SERPL CREATININE-BSD FRML MDRD: 121 ML/MIN/1.73SQ M
GLUCOSE P FAST SERPL-MCNC: 92 MG/DL (ref 65–99)
PHOSPHATE SERPL-MCNC: 3 MG/DL (ref 2.7–4.5)
POTASSIUM SERPL-SCNC: 4.1 MMOL/L (ref 3.5–5.3)
PROLACTIN SERPL-MCNC: 32.3 NG/ML
PROT SERPL-MCNC: 7.6 G/DL (ref 6.4–8.2)
SODIUM SERPL-SCNC: 137 MMOL/L (ref 136–145)
T4 FREE SERPL-MCNC: 0.91 NG/DL (ref 0.76–1.46)
TSH SERPL DL<=0.05 MIU/L-ACNC: 1.32 UIU/ML (ref 0.45–4.5)

## 2022-05-23 PROCEDURE — 82941 ASSAY OF GASTRIN: CPT

## 2022-05-23 PROCEDURE — 84146 ASSAY OF PROLACTIN: CPT

## 2022-05-23 PROCEDURE — 84439 ASSAY OF FREE THYROXINE: CPT

## 2022-05-23 PROCEDURE — 80053 COMPREHEN METABOLIC PANEL: CPT

## 2022-05-23 PROCEDURE — 36415 COLL VENOUS BLD VENIPUNCTURE: CPT

## 2022-05-23 PROCEDURE — 84443 ASSAY THYROID STIM HORMONE: CPT

## 2022-05-23 PROCEDURE — 84100 ASSAY OF PHOSPHORUS: CPT

## 2022-05-23 PROCEDURE — 82306 VITAMIN D 25 HYDROXY: CPT

## 2022-05-25 LAB — GASTRIN SERPL-MCNC: 85 PG/ML (ref 0–115)

## 2022-06-27 ENCOUNTER — TELEPHONE (OUTPATIENT)
Dept: INTERNAL MEDICINE CLINIC | Facility: CLINIC | Age: 27
End: 2022-06-27

## 2022-06-27 DIAGNOSIS — E21.3 HYPERPARATHYROIDISM (HCC): Primary | ICD-10-CM

## 2022-06-27 NOTE — TELEPHONE ENCOUNTER
Pt is scheduled to see Paulo Alston 27 on 7/6/22 for a Follow Up appt  Can you please put in a new order?     Dx- E21 3 (ICD-10-CM) - Hyperparathyroidism ([address] )

## 2022-07-06 ENCOUNTER — TELEPHONE (OUTPATIENT)
Dept: MULTI SPECIALTY CLINIC | Facility: CLINIC | Age: 27
End: 2022-07-06

## 2022-08-08 ENCOUNTER — TELEPHONE (OUTPATIENT)
Dept: GENETICS | Facility: CLINIC | Age: 27
End: 2022-08-08

## 2022-08-08 ENCOUNTER — TELEPHONE (OUTPATIENT)
Dept: HEMATOLOGY ONCOLOGY | Facility: CLINIC | Age: 27
End: 2022-08-08

## 2022-08-08 ENCOUNTER — ULTRASOUND (OUTPATIENT)
Dept: OBGYN CLINIC | Facility: CLINIC | Age: 27
End: 2022-08-08

## 2022-08-08 VITALS
SYSTOLIC BLOOD PRESSURE: 129 MMHG | HEIGHT: 60 IN | DIASTOLIC BLOOD PRESSURE: 82 MMHG | HEART RATE: 73 BPM | BODY MASS INDEX: 20.22 KG/M2 | WEIGHT: 103 LBS

## 2022-08-08 DIAGNOSIS — Z3A.09 9 WEEKS GESTATION OF PREGNANCY: ICD-10-CM

## 2022-08-08 DIAGNOSIS — N91.2 AMENORRHEA: Primary | ICD-10-CM

## 2022-08-08 PROBLEM — O09.899 H/O PRETERM DELIVERY, CURRENTLY PREGNANT: Status: ACTIVE | Noted: 2022-08-08

## 2022-08-08 PROBLEM — Z87.59 HISTORY OF CHOLESTASIS DURING PREGNANCY: Status: ACTIVE | Noted: 2022-08-08

## 2022-08-08 PROBLEM — Z87.19 HISTORY OF CHOLESTASIS DURING PREGNANCY: Status: ACTIVE | Noted: 2022-08-08

## 2022-08-08 LAB — SL AMB POCT URINE HCG: POSITIVE

## 2022-08-08 PROCEDURE — 76815 OB US LIMITED FETUS(S): CPT | Performed by: OBSTETRICS & GYNECOLOGY

## 2022-08-08 PROCEDURE — 81025 URINE PREGNANCY TEST: CPT | Performed by: OBSTETRICS & GYNECOLOGY

## 2022-08-08 RX ORDER — DOCUSATE SODIUM 100 MG/1
100 CAPSULE, LIQUID FILLED ORAL 2 TIMES DAILY
Qty: 60 CAPSULE | Refills: 3 | Status: SHIPPED | OUTPATIENT
Start: 2022-08-08 | End: 2022-10-04 | Stop reason: SDUPTHER

## 2022-08-08 RX ORDER — DIPHENHYDRAMINE HYDROCHLORIDE 25 MG/1
25 CAPSULE ORAL DAILY
Qty: 30 TABLET | Refills: 2 | Status: SHIPPED | OUTPATIENT
Start: 2022-08-08 | End: 2022-09-08

## 2022-08-08 RX ORDER — VITAMIN A, VITAMIN C, VITAMIN D-3, VITAMIN E, VITAMIN B-1, VITAMIN B-2, NIACIN, VITAMIN B-6, CALCIUM, IRON, ZINC, COPPER 4000; 120; 400; 22; 1.84; 3; 20; 10; 1; 12; 200; 27; 25; 2 [IU]/1; MG/1; [IU]/1; MG/1; MG/1; MG/1; MG/1; MG/1; MG/1; UG/1; MG/1; MG/1; MG/1; MG/1
1 TABLET ORAL
Qty: 30 TABLET | Refills: 5 | Status: SHIPPED | OUTPATIENT
Start: 2022-08-08 | End: 2022-08-23

## 2022-08-08 NOTE — PROGRESS NOTES
[facility]  Rogelio Nuñez  [address] [address]  Phone#  [phone]  Fax#  [phone]  Mat Kirby      Viability scan visit         Assessment  24 y o   at 9 weeks and 0 days presenting for amenorrhea  Pregnancy confirmed, dating consistent with LMP  TANIA   Plan    - Prenatal vitamins  - Prenatal panel (slips given today)  - Discussed genetic screening, patient desire to think about it a little more  - Prenatal Nursing Intake in 2 weeks  - Prenatal H&P in 4 weeks     Nausea and vomit  - Unisom and vit B6 ordered    Constipation  - Colace  ____________________________________________________________      Subjective   Mat Kirby is a 24 y o  Yulissaanabell Marie with an LMP of Patient's last menstrual period was 2022  who presents for amenorrhea  She states she took a home pregnancy test on  and it was positive  She is currently otherwise feeling good, states nausea with sporadic emesis  Patient states this pregnancy was planned  She was not using contraception at the time  She reports she is certain of her LMP and that she has regular menses  She has has no vaginal bleeding since her LMP  Patient's prior pregnancies were complicated by  labor at 35 weeks and cholestasis of pregnancy  Patient had a VAVD      Objective  /82   Pulse 73   Ht 5' (1 524 m)   Wt 46 7 kg (103 lb)   LMP 2022   BMI 20 12 kg/m²       Physical Exam:  Physical Exam  Constitutional:       Appearance: She is well-developed  HENT:      Head: Normocephalic and atraumatic  Eyes:      Conjunctiva/sclera: Conjunctivae normal       Pupils: Pupils are equal, round, and reactive to light  Cardiovascular:      Rate and Rhythm: Normal rate and regular rhythm  Heart sounds: Normal heart sounds  Pulmonary:      Effort: Pulmonary effort is normal       Breath sounds: Normal breath sounds     Abdominal:      General: Bowel sounds are normal       Palpations: Abdomen is soft  Genitourinary:     Vagina: Normal    Musculoskeletal:         General: Normal range of motion  Cervical back: Normal range of motion and neck supple  Skin:     General: Skin is warm  Neurological:      Mental Status: She is alert and oriented to person, place, and time  Transvaginal Pelvic Ultrasound  1  Gale IUP  2  CRL 2 02CM, consistent with EGA 8w4d  3  +yolk sac  4  +cardiac activity  5    6   No adnexal masses appreciated    Guera Limno MD

## 2022-08-08 NOTE — TELEPHONE ENCOUNTER
CALL TRANSFER   Reason for patient call? Patient calling for genetics  Transferred to genetics with a [address] [facility]     Patient's primary physician? Genetics   RN call was transferred to and time it was transferred? Genetics @2:10PM   Informed patient that the message will be forwarded to the team and someone will get back to them as soon as possible    Did you relay this information to the patient?  N/A

## 2022-08-08 NOTE — TELEPHONE ENCOUNTER
Patient called to reschedule her genetics appointment as she is not available to come in tomorrow due to work  Patient has been rescheduled for August 31 at 2:00 pm, patient was offered sooner appointments but she preferred this date/time  Televisit was offered but she mentioned that she wanted in person

## 2022-08-09 ENCOUNTER — TELEPHONE (OUTPATIENT)
Dept: OBGYN CLINIC | Facility: CLINIC | Age: 27
End: 2022-08-09

## 2022-08-09 NOTE — TELEPHONE ENCOUNTER
Received a call from pt, pt stated that she's at her pharmacy trying to  her prescriptions that was sent to her pharmacy  She stated that pharmacist told her that they haven't received any prescriptions  I called pt's pharmacy to confirm if they have received the prescriptions  Pharmacist stated that they only received the Prenatals and pt's insurance does not cover that type of prenatals with low iron and pt will need to pay $35 monthly  Called pt and informed pt agreed on paying the $35   Can you please review and advise for the other medications  Thank you

## 2022-08-10 ENCOUNTER — APPOINTMENT (OUTPATIENT)
Dept: LAB | Facility: HOSPITAL | Age: 27
End: 2022-08-10
Attending: INTERNAL MEDICINE
Payer: MEDICARE

## 2022-08-10 ENCOUNTER — CONSULT (OUTPATIENT)
Dept: ENDOCRINOLOGY | Facility: CLINIC | Age: 27
End: 2022-08-10
Payer: MEDICARE

## 2022-08-10 VITALS
HEART RATE: 65 BPM | HEIGHT: 60 IN | SYSTOLIC BLOOD PRESSURE: 102 MMHG | DIASTOLIC BLOOD PRESSURE: 60 MMHG | WEIGHT: 106 LBS | BODY MASS INDEX: 20.81 KG/M2

## 2022-08-10 DIAGNOSIS — E55.9 VITAMIN D DEFICIENCY: Primary | ICD-10-CM

## 2022-08-10 DIAGNOSIS — E21.3 HYPERPARATHYROIDISM (HCC): ICD-10-CM

## 2022-08-10 DIAGNOSIS — Z3A.09 9 WEEKS GESTATION OF PREGNANCY: ICD-10-CM

## 2022-08-10 DIAGNOSIS — E55.9 VITAMIN D DEFICIENCY: ICD-10-CM

## 2022-08-10 LAB
25(OH)D3 SERPL-MCNC: 12.3 NG/ML (ref 30–100)
ABO GROUP BLD: NORMAL
AMORPH URATE CRY URNS QL MICRO: ABNORMAL
BACTERIA UR QL AUTO: ABNORMAL /HPF
BASOPHILS # BLD AUTO: 0.02 THOUSANDS/ΜL (ref 0–0.1)
BASOPHILS NFR BLD AUTO: 0 % (ref 0–1)
BILIRUB UR QL STRIP: NEGATIVE
BLD GP AB SCN SERPL QL: NEGATIVE
CAOX CRY URNS QL MICRO: ABNORMAL /HPF
CLARITY UR: ABNORMAL
COLOR UR: ABNORMAL
EOSINOPHIL # BLD AUTO: 0.11 THOUSAND/ΜL (ref 0–0.61)
EOSINOPHIL NFR BLD AUTO: 1 % (ref 0–6)
ERYTHROCYTE [DISTWIDTH] IN BLOOD BY AUTOMATED COUNT: 11.9 % (ref 11.6–15.1)
GLUCOSE UR STRIP-MCNC: NEGATIVE MG/DL
HBV SURFACE AG SER QL: NORMAL
HCT VFR BLD AUTO: 35.1 % (ref 34.8–46.1)
HGB BLD-MCNC: 11.9 G/DL (ref 11.5–15.4)
HGB UR QL STRIP.AUTO: NEGATIVE
IMM GRANULOCYTES # BLD AUTO: 0.03 THOUSAND/UL (ref 0–0.2)
IMM GRANULOCYTES NFR BLD AUTO: 0 % (ref 0–2)
KETONES UR STRIP-MCNC: ABNORMAL MG/DL
LEUKOCYTE ESTERASE UR QL STRIP: ABNORMAL
LYMPHOCYTES # BLD AUTO: 2.25 THOUSANDS/ΜL (ref 0.6–4.47)
LYMPHOCYTES NFR BLD AUTO: 26 % (ref 14–44)
MCH RBC QN AUTO: 31.3 PG (ref 26.8–34.3)
MCHC RBC AUTO-ENTMCNC: 33.9 G/DL (ref 31.4–37.4)
MCV RBC AUTO: 92 FL (ref 82–98)
MONOCYTES # BLD AUTO: 0.7 THOUSAND/ΜL (ref 0.17–1.22)
MONOCYTES NFR BLD AUTO: 8 % (ref 4–12)
NEUTROPHILS # BLD AUTO: 5.66 THOUSANDS/ΜL (ref 1.85–7.62)
NEUTS SEG NFR BLD AUTO: 65 % (ref 43–75)
NITRITE UR QL STRIP: NEGATIVE
NON-SQ EPI CELLS URNS QL MICRO: ABNORMAL /HPF
NRBC BLD AUTO-RTO: 0 /100 WBCS
PH UR STRIP.AUTO: 6.5 [PH]
PLATELET # BLD AUTO: 272 THOUSANDS/UL (ref 149–390)
PMV BLD AUTO: 9 FL (ref 8.9–12.7)
PROT UR STRIP-MCNC: NEGATIVE MG/DL
RBC # BLD AUTO: 3.8 MILLION/UL (ref 3.81–5.12)
RBC #/AREA URNS AUTO: ABNORMAL /HPF
RH BLD: POSITIVE
RUBV IGG SERPL IA-ACNC: 8.6 IU/ML
SP GR UR STRIP.AUTO: 1.02 (ref 1–1.03)
SPECIMEN EXPIRATION DATE: NORMAL
UROBILINOGEN UR STRIP-ACNC: <2 MG/DL
WBC # BLD AUTO: 8.77 THOUSAND/UL (ref 4.31–10.16)
WBC #/AREA URNS AUTO: ABNORMAL /HPF

## 2022-08-10 PROCEDURE — 80081 OBSTETRIC PANEL INC HIV TSTG: CPT

## 2022-08-10 PROCEDURE — 36415 COLL VENOUS BLD VENIPUNCTURE: CPT

## 2022-08-10 PROCEDURE — 99243 OFF/OP CNSLTJ NEW/EST LOW 30: CPT | Performed by: INTERNAL MEDICINE

## 2022-08-10 PROCEDURE — 81001 URINALYSIS AUTO W/SCOPE: CPT

## 2022-08-10 PROCEDURE — 82306 VITAMIN D 25 HYDROXY: CPT

## 2022-08-10 PROCEDURE — 87086 URINE CULTURE/COLONY COUNT: CPT

## 2022-08-10 NOTE — PROGRESS NOTES
Consultation - Quinn Mancera 24 y o  female MRN: [id number]    Unit/Bed#:  Encounter: [id number]      Assessment/Plan     Assessment: This is a 24y o -year-old pregnant(9weeks) female with a past medical history of vitamin D deficiency, hyperparathyroidism now s/p parathyroidectomy in 06/2021 presents as a consult for hyperparathyroidism  Consult was delayed since last year due to logistic reasons per patient  All labs since surgery have been unremarkable except low levels of vitamin-D 16 6  She is currently not taking vitamin-D supplements but she is taking prenatal multivitamin that contains 400u of vitamin-D, we discussed restarting a vitamin-D if level is still low and she agrees  Plan:  - patient encouraged to follow-up with genetic counselor and she verbalized understanding   - will recheck vitamin-D level and if low will consider restarting   - will see patient if needed after further testing with genetic counselor     Consults    CC:  Consult for hyperparathyroidism    History of Present Illness     HPI: Quinn Mancera is a 24y o  year old female consult for hyperparathyroidism referred by PCP  She is currently 9 weeks pregnant and has a past medical history of vitamin-D deficiency and hyperparathyroidism( NM parathyroid scan showed uptake in the right lower pole suspicious for parathyroid adenoma), patient now s/p parathyroidectomy in 6/2021  She was given a referral since last year for endocrine but was not successful due to logistic reasons  Her most recent labs showed low vitamin-D level 16 6 and all other labs were within normal limits including calcium 8 8, PTH 67 5  Pt  has since stopped taking vitamin-D after her surgery, we discussed restarting the medication and she agrees  She has an upcoming appointment with genetic counselor on 08/31/2022 due to concerns about parathyroid adenoma at a young age        Today, she reports constipation which started since her pregnancy and hair loss which is chronic, patient denies dry skin, poor memory/ concentration, generalized fatigue, diarrhea, cold intolerance, difficulty/pain swallowing or neck discomfort and voice changes  Patient works as a , denies use of alcohol, cigarettes or recreational drugs, she has a family history of thyroid disease in her aunt, she denies history of cancer or any pertinent history, she has not had any other surgeries other than parathyroidectomy      Review of Systems 12 point review of system unremarkable except that listed in my HPI above    Historical Information   Past Medical History:   Diagnosis Date    Chronic pain     low back     Hyperparathyroidism ([address] )     Uses Telugu as primary spoken language      Past Surgical History:   Procedure Laterality Date    OR EXPLORE PARATHYROID GLANDS Right 06/10/2021    Procedure: Right minimally invasive parathyroidectomy; intraoperative PTH monitoring;  Surgeon: Alton Angel MD;  [facility];  Service: Surgical Oncology     Social History   Social History     Substance and Sexual Activity   Alcohol Use Never     Social History     Substance and Sexual Activity   Drug Use Never     Social History     Tobacco Use   Smoking Status Never Smoker   Smokeless Tobacco Never Used     Family History:   Family History   Problem Relation Age of Onset    No Known Problems Mother     No Known Problems Father     No Known Problems Sister     No Known Problems Brother     No Known Problems Son     No Known Problems Sister     No Known Problems Sister        Meds/Allergies   Current Outpatient Medications   Medication Sig Dispense Refill    Prenatal Vit-Fe Fumarate-FA (Prenatal Vitamin Plus Low Iron) 27-1 MG TABS Take 1 tablet by mouth daily in the early morning 30 tablet 5    cholecalciferol (VITAMIN D3) 25 mcg (1,000 units) tablet TAKE 1 TABLET BY MOUTH EVERY DAY (Patient not taking: No sig reported) 30 tablet 2    docusate sodium (COLACE) 100 mg capsule Take 1 capsule (100 mg total) by mouth 2 (two) times a day (Patient not taking: Reported on 8/10/2022) 60 capsule 3    doxylamine (UNISOM) 25 MG tablet Take 0 5 tablets (12 5 mg total) by mouth daily at bedtime as needed for sleep (Patient not taking: Reported on 8/10/2022) 30 tablet 1    medroxyPROGESTERone (DEPO-PROVERA) 150 mg/mL injection Inject 1 mL (150 mg total) into a muscle every 3 (three) months (Patient not taking: No sig reported) 1 mL 4    Pyridoxine HCl (vitamin B-6) 25 MG tablet Take 1 tablet (25 mg total) by mouth daily (Patient not taking: Reported on 8/10/2022) 30 tablet 2     No current facility-administered medications for this visit  No Known Allergies    Objective   Vitals: Blood pressure 102/60, pulse 65, height 5' (1 524 m), weight 48 1 kg (106 lb), not currently breastfeeding  [unfilled]  Invasive Devices  Report    None                 Physical Exam  Vitals reviewed  Constitutional:       Appearance: Normal appearance  HENT:      Head: Normocephalic and atraumatic  Nose: Nose normal       Mouth/Throat:      Mouth: Mucous membranes are moist       Pharynx: Oropharynx is clear  Eyes:      General: No scleral icterus  Extraocular Movements: Extraocular movements intact  Cardiovascular:      Rate and Rhythm: Normal rate and regular rhythm  Pulses: Normal pulses  Heart sounds: Normal heart sounds  No murmur heard  Pulmonary:      Effort: Pulmonary effort is normal    Abdominal:      General: Bowel sounds are normal       Palpations: Abdomen is soft  Musculoskeletal:         General: No swelling or tenderness  Normal range of motion  Cervical back: Normal range of motion and neck supple  Skin:     General: Skin is warm and dry  Capillary Refill: Capillary refill takes less than 2 seconds  Neurological:      General: No focal deficit present  Mental Status: She is alert and oriented to person, place, and time     Psychiatric:         Mood and Affect: Mood normal          Behavior: Behavior normal          The history was obtained from the review of the chart, patient  Lab Results:       Lab Results   Component Value Date    WBC 6 79 05/28/2021    HGB 14 6 05/28/2021    HCT 43 1 05/28/2021    MCV 94 05/28/2021     05/28/2021     Lab Results   Component Value Date/Time    BUN 17 05/23/2022 08:14 AM    K 4 1 05/23/2022 08:14 AM     (H) 05/23/2022 08:14 AM    CO2 23 05/23/2022 08:14 AM    CREATININE 0 69 05/23/2022 08:14 AM    AST 17 05/23/2022 08:14 AM    ALT 29 05/23/2022 08:14 AM    ALB 3 7 05/23/2022 08:14 AM     No results for input(s): CHOL, HDL, LDL, TRIG, VLDL in the last 72 hours  No results found for: Saadiael Ramon  No results found for: POCGLU    Imaging Studies: I have personally reviewed pertinent reports  Portions of the record may have been created with voice recognition software

## 2022-08-11 LAB
BACTERIA UR CULT: NORMAL
HIV 1+2 AB+HIV1 P24 AG SERPL QL IA: NORMAL
RPR SER QL: NORMAL

## 2022-08-12 ENCOUNTER — TELEPHONE (OUTPATIENT)
Dept: ENDOCRINOLOGY | Facility: CLINIC | Age: 27
End: 2022-08-12

## 2022-08-12 NOTE — TELEPHONE ENCOUNTER
----- Message from Trav Stevesn MD sent at 2022 10:58 AM EDT -----  Let her know that the vitamin D is low at 12 3  Please ask her to add 1000units of vitamin D in addition to what she is taking in the pre-nick vitamin   Thank you

## 2022-08-16 ENCOUNTER — INITIAL PRENATAL (OUTPATIENT)
Dept: OBGYN CLINIC | Facility: CLINIC | Age: 27
End: 2022-08-16

## 2022-08-16 DIAGNOSIS — Z34.91 PRENATAL CARE IN FIRST TRIMESTER: Primary | ICD-10-CM

## 2022-08-16 PROCEDURE — T1001 NURSING ASSESSMENT/EVALUATN: HCPCS

## 2022-08-16 NOTE — LETTER
Dentist [address] [facility]  1995  [address] [facility] [address]          08/16/22    We have had several requests from local dentist requesting permission to perform procedures on our patients who are pregnant  We wish to respond with this letter regarding some of the more routine procedures that we have been asked about  The following procedures may be performed on our obstetric patients:   1  Administration of local anesthesia   2  Administration of antibiotics such as PCN, Ampicillin, and Erythromycin  3  Administration of pain medications such as Tylenol, Tylenol with codeine, and if needed Percocet  4  Shielded X-rays    Should you have any questions, please do not hesitate to contact at [phone]          Sincerely,    [facility]   [phone]

## 2022-08-16 NOTE — LETTER
Work Letter    Jany Schneider  1995  [address] [address]    Dear Umu Nelson,      08/16/22        Your employee is a patient at [place] [address]  We recommend that all pregnant women:    1  Have a well-ventilated workspace  2  Wear low-heeled shoes  3  Work no more than 40 hours per week  4  Have a 15 minute break every 2 hours and at least 30 minutes for a meal break  5  Use good body mechanics by bending at your knees to avoid back strain and lift no more than 20 pounds without assistance  Will need assistance with lifting over 20 lbs  6  Have ready access to bathrooms and water  She may continue to work until her due date unless medical complications arise  We anticipate she may return to work in 6-8 weeks after delivery       Sincerely,  1 Alvin Lamar

## 2022-08-16 NOTE — LETTER
Proof of Pregnancy Letter    Jennifer Pain Wyatt  1995  [address] [facility] [address]        08/16/22      Jelani Rosario is a patient at our facility  Jennifer Schneider Estimated Date of Delivery: 3/13/2023  Any questions or concerns, please feel free to contact our office  Sincerely,    [facility]    [address] [place] [address], [facility]

## 2022-08-16 NOTE — LETTER
[facility] Letter    Herber Filler Wyatt  1995 [address]  [facility] [address]       08/16/22          Alton Winslow is a patient and under our care in our office  Herber Filler Wyatt's Estimated Date of Delivery: 3/13/2023  Any questions or concerns feel free to contact our office       Thank you,  [address]  [id number] [facility]/Ilya Bautista 15  [facility]/Geeta Damico [address] [place] [address]/[address]  [phone]

## 2022-08-16 NOTE — PROGRESS NOTES
OB INTAKE INTERVIEW  Pt presents for OB intake    OB History    Para Term  AB Living   2 1   1   1   SAB IAB Ectopic Multiple Live Births         0 1      # Outcome Date GA Lbr Pradip/2nd Weight Sex Delivery Anes PTL Lv   2 Current            1  10/16/16 35w3d / 00:50 2438 g (5 lb 6 oz) M Vag-Vacuum Local Y GLORY     Hx of  delivery prior to 36 weeks 6 days:  Yes   If yes, place a referral for cervical surveillance at 16 weeks  Last Menstrual Period:    Patient's last menstrual period was 2022  Ultrasound date: 2022  8 weeks 4 days     Estimated Date of Delivery: 3/13/2023  by LMP   H&P visit scheduled  2022 @ 1630  with Dr Saritha Roper     Last pap smear: 2021  Findings; lab pap smear results: no abnormalities    Current Issues:  Constipation :   Yes  Headaches :   No  Cramping:  No  Spotting :   No  PICA cravings :  No  FOB Involved:   Yes  Planned pregnancy:  Yes  I have these concerns about this prenatal patient:   [employer]  #[id number] used for visit today  Prenatals ordered to pharmacy are not covered by pt's insurance  Would like a different prenatal sent to pharmacy  Additionally would like vitamin D ordered to pharmacy because of recent lab results  C/o nausea  Discussed dietary modifications and use of Vit B6 and Unisom ordered to pharmacy prior  Pt states pharmacy did not provide these medications  Confirmed correct pharmacy  Reports some transportation issue for appointments  Care management referral placed  Hx  birth at 35 3wk  MFM orders placed at prior visit  Hx cholestasis  Partially vaccinated for covid  2nd shot not received      Interview education   [facility] reviewed and discussed    Baby and Me [address] Handout   [facility] Hand[facility]   Discussed genetic testing   Prenatal lab work: Scripts printed and given to pt   Influenza vaccine given today: No   Discussed Tdap vaccine  Immunizations:   Immunization History   Administered Date(s) Administered    Fluzone Split Quad 0 5 mL 10/11/2016    HPV9 05/22/2018, 06/10/2020, 10/12/2020    INFLUENZA 10/11/2016    Influenza Quadrivalent Preservative Free 3 years and older IM 10/11/2016    Tdap 10/11/2016     Depression Screening Follow-up Plan: Patient's depression screening was negative with an Rikkianeen Peeling score of  0  Clinically patient does not have depression  No treatment is required     Nurse/Family Partnership- referral placed:  N/A   If yes, place referral for nurse family partnership  BMI Counseling:  Tobacco Cessation Counseling: non-smoker  Infection Screening: Does the pt have a hx of MRSA? No  If yes- please follow MRSA protocol and obtain a nasal swab for MRSA culture  The patient was oriented to our practice and all questions were answered    Interviewed by: Thaddeus Gustafson RN 08/16/22

## 2022-08-17 ENCOUNTER — PATIENT OUTREACH (OUTPATIENT)
Dept: OBGYN CLINIC | Facility: CLINIC | Age: 27
End: 2022-08-17

## 2022-08-17 NOTE — PROGRESS NOTES
ROSHAN ARMSTRONG spoke with 26 y/o-S- G2:P1-  Gambian speaking woman for pre nick assessment  Pt resides with FOB and their 10 y/o son  Pt reported pregnancy was intended and both are happy  Pt denies any usage of drug, alcohol or smoking  Pt denies any Mental Health or domestic violence history  Pt has MA and need to call [facility] for appointment  Pt work part time as a   Pt reported FOB is the one who drives ans she need to schedule appointment on his days off  ROSHAN ARMSTRONG advice to mention her needs to the  staff at the time of d/c form her appointment  Pt denies other concerns at this time  Pt clamed FOB and extended family are very supportive  Pt is know to ROSHAN ARMSTRONG from prior pregnancy and is aware of role and contact information  Pt was encouraged to call at any time needed

## 2022-08-23 DIAGNOSIS — Z3A.11 11 WEEKS GESTATION OF PREGNANCY: Primary | ICD-10-CM

## 2022-08-23 PROBLEM — O09.899 RUBELLA NON-IMMUNE STATUS, ANTEPARTUM: Status: ACTIVE | Noted: 2022-08-23

## 2022-08-23 PROBLEM — Z28.39 RUBELLA NON-IMMUNE STATUS, ANTEPARTUM: Status: ACTIVE | Noted: 2022-08-23

## 2022-08-23 RX ORDER — PNV NO.95/FERROUS FUM/FOLIC AC 28MG-0.8MG
1 TABLET ORAL DAILY
Qty: 30 TABLET | Refills: 9 | Status: SHIPPED | OUTPATIENT
Start: 2022-08-23 | End: 2022-09-08

## 2022-08-23 RX ORDER — MELATONIN
2000 DAILY
Qty: 60 TABLET | Refills: 2 | Status: SHIPPED | OUTPATIENT
Start: 2022-08-23 | End: 2022-09-22

## 2022-08-30 ENCOUNTER — ROUTINE PRENATAL (OUTPATIENT)
Dept: OBGYN CLINIC | Facility: CLINIC | Age: 27
End: 2022-08-30

## 2022-08-30 ENCOUNTER — TELEPHONE (OUTPATIENT)
Dept: HEMATOLOGY ONCOLOGY | Facility: CLINIC | Age: 27
End: 2022-08-30

## 2022-08-30 VITALS
HEART RATE: 75 BPM | RESPIRATION RATE: 18 BRPM | WEIGHT: 105 LBS | HEIGHT: 60 IN | BODY MASS INDEX: 20.62 KG/M2 | DIASTOLIC BLOOD PRESSURE: 72 MMHG | SYSTOLIC BLOOD PRESSURE: 107 MMHG

## 2022-08-30 DIAGNOSIS — B00.9 HERPES SIMPLEX INFECTION: Primary | ICD-10-CM

## 2022-08-30 DIAGNOSIS — Z11.3 ENCOUNTER FOR SCREENING EXAMINATION FOR SEXUALLY TRANSMITTED DISEASE: ICD-10-CM

## 2022-08-30 PROBLEM — E21.3 HYPERPARATHYROIDISM (HCC): Status: RESOLVED | Noted: 2021-02-08 | Resolved: 2022-08-30

## 2022-08-30 PROCEDURE — 87591 N.GONORRHOEAE DNA AMP PROB: CPT | Performed by: OBSTETRICS & GYNECOLOGY

## 2022-08-30 PROCEDURE — 99213 OFFICE O/P EST LOW 20 MIN: CPT | Performed by: OBSTETRICS & GYNECOLOGY

## 2022-08-30 PROCEDURE — 87491 CHLMYD TRACH DNA AMP PROBE: CPT | Performed by: OBSTETRICS & GYNECOLOGY

## 2022-08-30 RX ORDER — ASCORBIC ACID, CHOLECALCIFEROL, .ALPHA.-TOCOPHEROL ACETATE, DL-, PYRIDOXINE, FOLIC ACID, CYANOCOBALAMIN, CALCIUM, FERROUS FUMARATE, MAGNESIUM, DOCONEXENT 85; 200; 10; 25; 1; 12; 140; 27; 45; 300 [IU]/1; [IU]/1; [IU]/1; [IU]/1; MG/1; UG/1; MG/1; MG/1; MG/1; MG/1
CAPSULE, GELATIN COATED ORAL
COMMUNITY
Start: 2022-08-09

## 2022-08-30 RX ORDER — CHOLECALCIFEROL (VITAMIN D3) 25 MCG
2000 CAPSULE ORAL DAILY
COMMUNITY
Start: 2022-08-23 | End: 2022-09-08

## 2022-08-30 RX ORDER — DIPHENHYDRAMINE HCL 12.5MG/5ML
LIQUID (ML) ORAL
COMMUNITY
Start: 2022-08-23

## 2022-08-30 NOTE — PROGRESS NOTES
OB/GYN  PRENATAL H&P VISIT  Reece Canales  2022  5:02 PM  Dr Kylah Bolton MD      SUBJECTIVE  Patient is a [id number] at 12w1d here for initial prenatal H&P, dating by LMP, confirmed by first trimester ultrasound  This is an intended and desired pregnancy  She is currently doing well  She works at [employer] as a   She denies hx of STD/STI, denies a hx of TB or close contacts with persons with TB  She has not had MRSA  She denies a family history of inheritable conditions such as physical or intellectual disabilities, birth defects, blood disorders, heart or neural tube defects  She denies recent travel or travel planned in the near future  She denies use of nicotine or recreational drug use  She denies use of ETOH  She denies vaginal bleeding, cramping, leakage, abnormal discharge  She reports some nausea, improved with unisom, but no vomiting  OB History    Para Term  AB Living   2 1 0 1 0 1   SAB IAB Ectopic Multiple Live Births   0 0 0 0 1      # Outcome Date GA Lbr Pradip/2nd Weight Sex Delivery Anes PTL Lv   2 Current            1  10/16/16 35w3d / 00:50 2438 g (5 lb 6 oz) M Vag-Vacuum Local Y GLORY      Name: Steph Carpio  ([address])      Apgar1: 8  Apgar5: 9       Review of Systems   Constitutional: Negative for fever  HENT: Negative for rhinorrhea, sinus pressure, sneezing and sore throat  Eyes: Negative for visual disturbance  Respiratory: Negative for cough, shortness of breath and wheezing  Cardiovascular: Negative for chest pain, palpitations and leg swelling  Gastrointestinal: Positive for nausea  Negative for abdominal distention, abdominal pain, blood in stool and vomiting  Genitourinary: Negative for dysuria, flank pain, vaginal bleeding and vaginal discharge  Musculoskeletal: Negative for neck pain and neck stiffness  Skin: Negative for color change, pallor and rash     Neurological: Negative for light-headedness, numbness and headaches  Past Medical History:   Diagnosis Date    Chronic pain     low back     Hyperparathyroidism ([address] )     Uses Slovak as primary spoken language        Past Surgical History:   Procedure Laterality Date    [place] EXPLORE PARATHYROID GLANDS Right 06/10/2021    Procedure: Right minimally invasive parathyroidectomy; intraoperative PTH monitoring;  Surgeon: Joe Rizvi MD;  [facility];  Service: Surgical Oncology       Social History     Socioeconomic History    Marital status: /Civil Union     Spouse name: Angela Hui Number of children: 1    Years of education: 15    Highest education level: High school graduate   Occupational History    Occupation: restaurant   Tobacco Use    Smoking status: Never Smoker    Smokeless tobacco: Never Used   Vaping Use    Vaping Use: Never used   Substance and Sexual Activity    Alcohol use: Never    Drug use: Never    Sexual activity: Yes     Partners: Male     Birth control/protection: None   Other Topics Concern    Not on file   Social History Narrative    Not on file     Social Determinants of Health     Financial Resource Strain: Low Risk     Difficulty of Paying Living Expenses: Not hard at all   Food Insecurity: No Food Insecurity    Worried About [address] in the Last Year: Never true    [address] in the Last Year: Never true   Transportation Needs: No Transportation Needs    Lack of Transportation (Medical): No    Lack of Transportation (Non-Medical):  No   Physical Activity: Not on file   Stress: No Stress Concern Present    Feeling of Stress : Not at all   Social Connections: Not on file   Intimate Partner Violence: Not At Risk    Fear of Current or Ex-Partner: No    Emotionally Abused: No    Physically Abused: No    Sexually Abused: No   Housing Stability: Low Risk     Unable to Pay for Housing in the Last Year: No    Number of Places Lived in the Last Year: 1    Unstable Housing in the Last Year: No       OBJECTIVE  Vitals:    08/30/22 1648   BP: 107/72   Pulse: 75   Resp: 18     Physical Exam  Constitutional:       General: She is not in acute distress  Appearance: She is well-developed  She is not diaphoretic  Genitourinary:      Vulva normal       No lesions in the vagina  Right Labia: No rash, tenderness, lesions or skin changes  Left Labia: No tenderness, lesions, skin changes or rash  No vaginal discharge, erythema, tenderness or bleeding  Right Adnexa: not full  Left Adnexa: not full  No cervical motion tenderness, discharge, friability, lesion or polyp  No parametrium nodularity or thickening present  Uterus is not fixed or tender  No uterine mass detected  Breasts:      Right: No supraclavicular adenopathy  Left: No supraclavicular adenopathy  HENT:      Head: Normocephalic and atraumatic  Eyes:      Conjunctiva/sclera: Conjunctivae normal    Cardiovascular:      Rate and Rhythm: Normal rate and regular rhythm  Heart sounds: Normal heart sounds  No murmur heard  No friction rub  No gallop  Pulmonary:      Effort: Pulmonary effort is normal  No respiratory distress  Breath sounds: Normal breath sounds  No stridor  No wheezing or rales  Chest:      Chest wall: No tenderness  Abdominal:      General: There is no distension  Palpations: Abdomen is soft  There is no mass  Tenderness: There is no abdominal tenderness  There is no guarding or rebound  Hernia: No hernia is present  Musculoskeletal:         General: No deformity  Normal range of motion  Cervical back: Normal range of motion and neck supple  Right lower leg: No edema  Left lower leg: No edema  Lymphadenopathy:      Upper Body:      Right upper body: No supraclavicular adenopathy  Left upper body: No supraclavicular adenopathy  Neurological:      General: No focal deficit present        Mental Status: She is alert  Mental status is at baseline  Skin:     General: Skin is warm and dry  Findings: No erythema  Psychiatric:         Mood and Affect: Mood normal          Behavior: Behavior normal    Exam conducted with a chaperone present  ASSESSMENT AND PLAN    24 y o , , with /72 (BP Location: Left arm, Patient Position: Sitting, Cuff Size: Adult)   Pulse 75   Resp 18   Ht 5' (1 524 m)   Wt 47 6 kg (105 lb)   LMP 2022 , at 12w1d here for her prenatal H&P   by TRAVIS    Pregnancy: H&P completed today  PN Labs reviewed today  A positive, notable for Rubella equivocal status  Labor expectations discussed with patient, including appointment schedule, nutrition, exercise, medications, sexual intercourse, and nausea/vomiting  Patient's BMI is 20  Recommended weight gain is 25-35lbs  Advised patient to call clinic for triage of obstetric complaints  Advised to present to [facility] for labor and delivery, for triage, or for clinical emergencies  Screening: Pap smear due in , last performed 21  GC/CT collected   Center appointment 22  Consents: Delivery process including potential OVD and  reviewed  Sign delivery consent form at 28 weeks  Labor: For analgesia, patient plans on avoiding epidural, is planning on breastfeeding    History of  delivery: Delivered at 35 weeks after presenting in  labor  Follow up with MFM, consider cervical length surveillance at 16 weeks    History of cholestasis of pregnancy: Discussed with patient risk of recurrence, and to monitor symptoms  History of primary hyperparathyroidism s/p parathyroidectomy: Normal PTH and calcium, on Vitamin D supplementation due to low levels  Following up with genetic counseling tomorrow to discuss testing for MEN1      Contraception: Different methods of contraception were discussed with patient, including progesterone only oral pills, depo provera, nexplanon, mirena, and paragard  Patient would like to use undecided during postpartum phase  Immunizations: Covid vaccine received 2 shots, influenza vaccine due this fall, TDAP due at 28 weeks  Follow up: RTC in 4 weeks  Precautions regarding labor, leakage, bleeding, and fetal movement reviewed      Discussed with Dr Haile Peres MD  8/30/2022  5:02 PM

## 2022-08-31 ENCOUNTER — CLINICAL SUPPORT (OUTPATIENT)
Dept: GENETICS | Facility: CLINIC | Age: 27
End: 2022-08-31
Payer: MEDICARE

## 2022-08-31 DIAGNOSIS — E21.3 HYPERPARATHYROIDISM (HCC): Primary | ICD-10-CM

## 2022-08-31 LAB
C TRACH DNA SPEC QL NAA+PROBE: NEGATIVE
N GONORRHOEA DNA SPEC QL NAA+PROBE: NEGATIVE

## 2022-08-31 PROCEDURE — NC001 PR NO CHARGE

## 2022-08-31 PROCEDURE — 36415 COLL VENOUS BLD VENIPUNCTURE: CPT

## 2022-08-31 NOTE — PROGRESS NOTES
Pre-Test Genetic Counseling Consult Note    Patient is Tamazight speaking and all conversation took place using [id number]  Patient Name: Bedford Lombard   /Age: /78 y o  Referring Provider: Geo Booker MD    Date of Service: 2022  Genetic Counselor: Jojo Vilchis Genetic Counselor  Interpretation Services: [email or web address] video    [facility]      Blair Bolanos was referred to the [address] [place] [address] and Genetic Assessment Program due to her personal history of a parathyroid adenoma and family history of thyroid cancer  she presents today to discuss the possibility of a hereditary cancer syndrome, options for genetic testing, and implications for her and her family  Her  and son accompanied her to the appointment  Cancer History and Treatment:   Personal History: Primary hyperparathyroidism secondary to right lower parathyroid adenoma   Hypercalcemia discovered in the lab work   S/p right minimally invasive parathyroidectomy (2021)  A  Right inferior parathyroid:  - Hypercellular parathyroid gland, 820 milligrams in weight, consistent with proliferative parathyroid disorder   - Benign thymus tissue with age-appropriate morphology is found adjacent to the parathyroid gland      Screening Hx:   Breast:  Breast Imaging: none  Breast Biopsy: none  Breast Density: Unknown    Colon:  Colonoscopy: none    Abdominal:   Pancreatic, duodenal, foregut: none    Brain:  Imaging: none    Gynecologic:  Ovaries and Uterus intact     Skin:  No current screening    Other screening: none    Reproductive History  Age at menarche: 17  Age at first live birth: 23  Menopause: Premenopausal  Hormone replacement: none    Medical and Surgical History  Pertinent surgical history:   Past Surgical History:   Procedure Laterality Date    [place] EXPLORE PARATHYROID GLANDS Right 06/10/2021    Procedure: Right minimally invasive parathyroidectomy; intraoperative PTH monitoring;  Surgeon: Radha Campbell MD;  [facility];  Service: Surgical Oncology      Pertinent medical history:  Past Medical History:   Diagnosis Date    Chronic pain     low back     Hyperparathyroidism ([address] )     Uses English as primary spoken language          Other History:  Height:   Ht Readings from Last 1 Encounters:   08/30/22 5' (1 524 m)     Weight:   Wt Readings from Last 1 Encounters:   08/30/22 47 6 kg (105 lb)       Relevant Family History   Patient reports non-Ashkenazi Yarsanism ancestry  Maternal Family History: Mother: s/p DAVID secondary to myomas   First cousin: thyroid cancer, unknown type diagnosed at 52 (currently 47 y/o)  Aunt: "thyroid issues"  First cousin: leukemia diagnosed at 15 (currently 26 y/o)  Limited information     Paternal Family History:  Non-contributory  Limited information     Please refer to the scanned pedigree in the Media Tab for a complete family history     *All history is reported as provided by the patient; records are not available for review, except where indicated  Assessment:  We discussed sporadic, familial and hereditary cancer  We also discussed the many factors that influence our risk for cancer such as age, environmental exposures, lifestyle choices and family history  We reviewed the indications suggestive of a hereditary predisposition to cancer  Genetic testing is indicated for Tita Osei based on the following criteria: Meets NCCN Neuroendocrine and Adrenal Tumors Testing Criteria V1 2021 which recommends individuals with clinical suspicion for MEN1 pursue testing  The patient's primary hyperparathyroidism (parathyroid adenoma and hypercalcemia) and family history of thyroid cancer and thyroid issues       The risks, benefits, and limitations of genetic testing were reviewed with the patient, as well as genetic discrimination laws, and possible test results (positive, negative, variants of uncertain significance) and their clinical implications  If positive for a mutation, options for managing cancer risk including increased surveillance, chemoprevention, and in some cases prophylactic surgery were discussed  Markus Justice was informed that if a hereditary cancer syndrome was identified in her, first degree relatives (parents, siblings, and children) have a chance of also inheriting the condition  Genetic testing would allow for predictive genetic testing in other relatives, who may also be at risk depending on their degree of relation  Plan: Patient decided to proceed with testing and provided consent  Summary:     Sample Collection:  The patient's blood sample was drawn in the office on 8/31/22 by medical assistant Eduardo Parra    Genetic Testing Preformed: [facility] Common Hereditary Cancers Panel- Customized (52 genes): APC, CHAN, AXIN2, BARD1, BMPR1A, BRCA1, BRCA2, BRIP1, CDH1, CDK4, CDKN2A, CHEK2, CTNNA1, DICER1, EPCAM, FH, GREM1, HOXB13, KIT, MEN1, MLH1, MSH2, MSH3, MSH6, MUTYH, NBN, NF1, NTHL1, PALB2, PDGFRA, PMS2, POLD1, POLE, PTEN, RAD50, RAD51C, RAD51D, RET, SDHA, SDHB, SDHC, SDHD, SMAD4, SMARCA4, STK11, TP53, TSC1, TSC2, VHL    Results take approximately 2-3 weeks to complete once test is started  We will contact Markus Justice once results are available  Additional recommendations for surveillance/medical management will be made pending genetic test results

## 2022-08-31 NOTE — LETTER
2022     Randy Mccormick PA-C    Patient: Mary Schneider  YOB: 1995  Date of Visit: 2022      Dear Dr Jessica Gunn: Thank you for referring [facility] to me for evaluation  Below are my notes for this consultation  If you have questions, please do not hesitate to call me  I look forward to following your patient along with you  Sincerely,        Tommy Gomes        CC: No Recipients        Pre-Test Genetic Counseling Consult Note    Patient is Albanian speaking and all conversation took place using [id number]  Patient Name: Erin Frederick   /Age: /85 y o  Referring Provider: Randy Mccormick MD    Date of Service: 2022  Genetic Counselor: Tommy Gomes, Jojo Genetic Counselor  Interpretation Services: [email or web address]    [facility]: [facility]      Nanette Wong was referred to the [address] [place] [address] and Genetic Assessment Program due to her personal history of a parathyroid adenoma and family history of thyroid cancer  she presents today to discuss the possibility of a hereditary cancer syndrome, options for genetic testing, and implications for her and her family  Her  and son accompanied her to the appointment  Cancer History and Treatment:   Personal History: Primary hyperparathyroidism secondary to right lower parathyroid adenoma   Hypercalcemia discovered in the lab work   S/p right minimally invasive parathyroidectomy (2021)  A  Right inferior parathyroid:  - Hypercellular parathyroid gland, 820 milligrams in weight, consistent with proliferative parathyroid disorder   - Benign thymus tissue with age-appropriate morphology is found adjacent to the parathyroid gland      Screening Hx:   Breast:  Breast Imaging: none  Breast Biopsy: none  Breast Density: Unknown    Colon:  Colonoscopy: none    Abdominal:   Pancreatic, duodenal, foregut: none    Brain:  Imaging: none    Gynecologic:  Ovaries and Uterus intact     Skin:  No current screening    Other screening: none    Reproductive History  Age at menarche: 17  Age at first live birth: 23  Menopause: Premenopausal  Hormone replacement: none    Medical and Surgical History  Pertinent surgical history:   Past Surgical History:   Procedure Laterality Date    [place] EXPLORE PARATHYROID GLANDS Right 06/10/2021    Procedure: Right minimally invasive parathyroidectomy; intraoperative PTH monitoring;  Surgeon: Rick Feldman MD;  [facility];  Service: Surgical Oncology      Pertinent medical history:  Past Medical History:   Diagnosis Date    Chronic pain     low back     Hyperparathyroidism ([address] )     Uses Polish as primary spoken language          Other History:  Height:   Ht Readings from Last 1 Encounters:   08/30/22 5' (1 524 m)     Weight:   Wt Readings from Last 1 Encounters:   08/30/22 47 6 kg (105 lb)       Relevant Family History   Patient reports non-Ashkenazi Episcopal ancestry  Maternal Family History: Mother: s/p DAVID secondary to myomas   First cousin: thyroid cancer, unknown type diagnosed at 52 (currently 49 y/o)  Aunt: "thyroid issues"  First cousin: leukemia diagnosed at 15 (currently 28 y/o)  Limited information     Paternal Family History:  Non-contributory  Limited information     Please refer to the scanned pedigree in the Media Tab for a complete family history     *All history is reported as provided by the patient; records are not available for review, except where indicated  Assessment:  We discussed sporadic, familial and hereditary cancer  We also discussed the many factors that influence our risk for cancer such as age, environmental exposures, lifestyle choices and family history  We reviewed the indications suggestive of a hereditary predisposition to cancer      Genetic testing is indicated for Shubham Ramirez based on the following criteria: Meets NCCN Neuroendocrine and Adrenal Tumors Testing Criteria V1 2021 which recommends individuals with clinical suspicion for MEN1 pursue testing  The patient's primary hyperparathyroidism (parathyroid adenoma and hypercalcemia) and family history of thyroid cancer and thyroid issues  The risks, benefits, and limitations of genetic testing were reviewed with the patient, as well as genetic discrimination laws, and possible test results (positive, negative, variants of uncertain significance) and their clinical implications  If positive for a mutation, options for managing cancer risk including increased surveillance, chemoprevention, and in some cases prophylactic surgery were discussed  Susan Riley was informed that if a hereditary cancer syndrome was identified in her, first degree relatives (parents, siblings, and children) have a chance of also inheriting the condition  Genetic testing would allow for predictive genetic testing in other relatives, who may also be at risk depending on their degree of relation  Plan: Patient decided to proceed with testing and provided consent  Summary:     Sample Collection:  The patient's blood sample was drawn in the office on 8/31/22 by medical assistant Wilman Jesus    Genetic Testing Preformed: [facility] Common Hereditary Cancers Panel- Customized (52 genes): APC, CHAN, AXIN2, BARD1, BMPR1A, BRCA1, BRCA2, BRIP1, CDH1, CDK4, CDKN2A, CHEK2, CTNNA1, DICER1, EPCAM, FH, GREM1, HOXB13, KIT, MEN1, MLH1, MSH2, MSH3, MSH6, MUTYH, NBN, NF1, NTHL1, PALB2, PDGFRA, PMS2, POLD1, POLE, PTEN, RAD50, RAD51C, RAD51D, RET, SDHA, SDHB, SDHC, SDHD, SMAD4, SMARCA4, STK11, TP53, TSC1, TSC2, VHL    Results take approximately 2-3 weeks to complete once test is started  We will contact Susan Riley once results are available  Additional recommendations for surveillance/medical management will be made pending genetic test results

## 2022-09-07 NOTE — PATIENT INSTRUCTIONS
Thank you for choosing us for your  care today  If you have any questions about your ultrasound or care, please do not hesitate to contact us or your primary obstetrician  Some general instructions for your pregnancy are:    Protect against coronavirus: get vaccinated - pregnant women are increased risk of severe COVID  Notify your primary care doctor if you have any symptoms  Exercise: Aim for 22 minutes per day (150 minutes per week) of regular exercise  Walking is great! Nutrition: aim for calcium-rich and iron-rich foods as well as healthy sources of protein  Learn about Preeclampsia: preeclampsia is a common, serious high blood pressure complication in pregnancy  A blood pressure of 030KDVV (systolic or top number) or 51RIQF (diastolic or bottom number) is not normal and needs evaluation by your doctor  Aspirin is sometimes prescribed in early pregnancy to prevent preeclampsia in women with risk factors - ask your obstetrician if you should be on this medication  If you smoke, try to reduce how many cigarettes you smoke or try to quit completely  Do not vape  Other warning signs to watch out for in pregnancy or postpartum: chest pain, obstructed breathing or shortness of breath, seizures, thoughts of hurting yourself or your baby, bleeding, a painful or swollen leg, fever, or headache (see [facility] POST-BIRTH Warning Signs campaign)  If these happen call 911  Itching is also not normal in pregnancy and if you experience this, especially over your hands and feet, potentially worse at night, notify your doctors

## 2022-09-08 ENCOUNTER — ROUTINE PRENATAL (OUTPATIENT)
Dept: PERINATAL CARE | Facility: CLINIC | Age: 27
End: 2022-09-08
Payer: MEDICARE

## 2022-09-08 VITALS
HEART RATE: 69 BPM | SYSTOLIC BLOOD PRESSURE: 118 MMHG | HEIGHT: 60 IN | WEIGHT: 107.6 LBS | DIASTOLIC BLOOD PRESSURE: 60 MMHG | BODY MASS INDEX: 21.13 KG/M2

## 2022-09-08 DIAGNOSIS — Z3A.13 13 WEEKS GESTATION OF PREGNANCY: ICD-10-CM

## 2022-09-08 DIAGNOSIS — Z87.19 HISTORY OF CHOLESTASIS DURING PREGNANCY: ICD-10-CM

## 2022-09-08 DIAGNOSIS — Z87.59 HISTORY OF CHOLESTASIS DURING PREGNANCY: ICD-10-CM

## 2022-09-08 DIAGNOSIS — O09.899 H/O PRETERM DELIVERY, CURRENTLY PREGNANT: Primary | ICD-10-CM

## 2022-09-08 DIAGNOSIS — Z36.82 ENCOUNTER FOR (NT) NUCHAL TRANSLUCENCY SCAN: ICD-10-CM

## 2022-09-08 PROCEDURE — 76801 OB US < 14 WKS SINGLE FETUS: CPT | Performed by: NURSE PRACTITIONER

## 2022-09-08 PROCEDURE — 76801 OB US < 14 WKS SINGLE FETUS: CPT | Performed by: OBSTETRICS & GYNECOLOGY

## 2022-09-08 PROCEDURE — 76813 OB US NUCHAL MEAS 1 GEST: CPT | Performed by: OBSTETRICS & GYNECOLOGY

## 2022-09-08 PROCEDURE — 99242 OFF/OP CONSLTJ NEW/EST SF 20: CPT | Performed by: NURSE PRACTITIONER

## 2022-09-08 PROCEDURE — 36415 COLL VENOUS BLD VENIPUNCTURE: CPT | Performed by: NURSE PRACTITIONER

## 2022-09-08 PROCEDURE — 76813 OB US NUCHAL MEAS 1 GEST: CPT | Performed by: NURSE PRACTITIONER

## 2022-09-08 NOTE — LETTER
2022     Carla Bynum, [address] [address]    Patient: Courtney Guzmán   YOB: 1995   Date of Visit: 2022       Dear Dr Stephanie Mehta: Thank you for referring Patricia Alarcon to me for evaluation  Below are my notes for this consultation  If you have questions, please do not hesitate to call me  I look forward to following your patient along with you  Sincerely,        Kyle Dahl MD        CC: No Recipients  Kyle Dahl MD  2022 10:09 AM  Sign when Signing Edwin Alvarez      Dear Dr Daquan Pruett: Thank you for requesting a  consultation on your patient Ms Schneider for the following indications:  Genetic screening   services used for translation into [facility] [place]  History  Medications: Prenatal Vitamin, Colace, Vit D  Allergies to medications: NKDA  Past medical history: Parathyroid adenoma  Past surgical history:  right minimally invasive parathyroidectomy of the right gland only (negative)  She met with genetics at  [place] [address] on 22 due to her personal history of a parathyroid adenoma and family history of thyroid cancer and had blood work drawn to screen to see if she has any abnormal genes that would predispose her to developing cancer -results will take 2-3 weeks and are pending  She met with endocrinology in pregnancy and has a hx of a normal Calcium 22  and PTH level in 2022 and a low vit D of 12 8/10/22 and was started on Vit D 2000 iu daily on 22  Past obstetrical history:  10/16/16  (35w3d)/vaginal/female/5-6- pregnancy was complicated by cholestasis  Social history:  Denies alcohol, tobacco or drug use  First generation family history: Thyroid cancer     Ultrasound findings: The ultrasound shows a fetus concordant with dates  The nuchal translucency appears normal   The nasal bone was not visualized due to fetal position  No malformations are seen on today's early ultrasound  The patient was informed of the findings and counseled about the limitations of the exam in detecting all forms of fetal congenital abnormalities  She does not report any vaginal bleeding or uterine cramping or contractions  Specific counseling was provided on the following problems:  1  We discussed the options for genetic screening which include invasive testing on the fetal placenta or on fetal skin cells within the amniotic fluid and compared this to noninvasive testing which includes cell free DNA screening and the sequential screen  We reviewed the risks, the benefits and the limitations of each  In the end patient chose to complete the cell free DNA screen  2  We reviewed her history of spontaneous  delivery, which increases her risk for  birth in this pregnancy  In women with a history of spontaneous  birth, treatment with progesterone therapy from 16-36 weeks gestation may reduce recurrence risk  Studies evaluating the effectiveness of progesterone therapy in reducing recurrence risk for  delivery have produced conflicting results regarding effectiveness, but have not suggested  harm with treatment  Progesterone can either be given as a weekly injection (IM or SQ) of 17-OH progesterone caproate, or as a vaginal suppository of 200mg daily  Either route of progesterone is a reasonable option endorsed by the Society for Maternal-Fetal Medicine  Serial cervical surveillance (by transvaginal sonography) is helpful in predicting risk of  delivery  Measurement of transvaginal cervical length is recommended between 16 0/7 and 23 6/7 weeks gestation  Cervical length equal to or less than 25mm before 24 weeks gestation would prompt recommendation for a transvaginal ultrasound-indicated cerclage   In women with a history of spontaneous  delivery, approximately 1/3 will require cerclage placement, and 2/3 will have normal cervical length screening and need progesterone therapy alone  Surveillance for signs and symptoms of  labor and rupture of membranes are recommended during the pregnancy  After our discussion, she plans to pursue cervical lengths and vaginal progesterone which was prescribed  3  Jeremiah Ramires is a known carrier of cystic fibrosis and states her partner was tested in her prior pregnancy and was negative  Future tests recommended:  1  The results of her NIPT will return in 7-10 days and her OB office will order an MSAFP screen at 16-18 weeks to screen for spina bifida  2    Given her history of cholestasis a baseline bile acid level is recommended and ordered today  3  Recommend another vit D level in 6-8 weeks after supplementation started  Future ultrasounds ordered today:         1  Cervical lengths every 2 weeks beginning at 16           weeks until 23 weeks 6 days scheduled today  2   Fetal Level II ultrasound imaging is recommended at 19-20 weeks' gestation  Pre visit time reviewing her records   10 minutes  Face to face time 30 minutes  Post visit time on documentation of note, updating her problem list, adding orders and prescriptions 10 minutes  Procedures that were completed today were charged separately  The level of decision making was moderate complexity    Katie MITCHELL    I supervised the Advanced Practitioner, and we discussed the care of this patient after she saw the patient independently while I was present in the office  I reviewed the note and her ultrasound pictures and agree        Isabelle Best MD

## 2022-09-08 NOTE — PROGRESS NOTES
Patient chose to have [facility] Non-invasive Prenatal Screen  Patient given brochure and is aware [facility] will contact patients insurance and coordinate coverage  Patient made aware she will need to respond to text message or e-mail from [facility] within 2 business days or testing will be run through insurance  Patient informed text message will come from area code  "415"  Provided [employer] # [phone] and web site : [email or web address]     2 vials of blood drawn from Right arm, patient tolerated blood draw without difficulty  Specimens labeled with patient identifiers (name, date of birth, specimen collection date), order and specimen was verified with patient, packed and sent via [employer]  Copy of lab order scanned to Epic media  Maternal Fetal Medicine will have results in approximately 7-10 business days and will call patient or notify via [address]  Patient aware viewing lab result online will reveal fetal sex If ordered  Patient verbalized understanding of all instructions and no questions at this time

## 2022-09-09 PROBLEM — E55.9 VITAMIN D DEFICIENCY: Status: ACTIVE | Noted: 2022-09-09

## 2022-09-09 NOTE — PROGRESS NOTES
Brad Found      Dear Dr Susana Corona: Thank you for requesting a  consultation on your patient Ms Schneider for the following indications:  Genetic screening   services used for translation into [address]  History  Medications: Prenatal Vitamin, Colace, Vit D  Allergies to medications: NKDA  Past medical history: Parathyroid adenoma  Past surgical history:  right minimally invasive parathyroidectomy of the right gland only (negative)  She met with genetics at  [place] [address] on 22 due to her personal history of a parathyroid adenoma and family history of thyroid cancer and had blood work drawn to screen to see if she has any abnormal genes that would predispose her to developing cancer -results will take 2-3 weeks and are pending  She met with endocrinology in pregnancy and has a hx of a normal Calcium 22  and PTH level in 2022 and a low vit D of 12 8/10/22 and was started on Vit D 2000 iu daily on 22  Past obstetrical history:  10/16/16  (35w3d)/vaginal/female/5-6- pregnancy was complicated by cholestasis  Social history:  Denies alcohol, tobacco or drug use  First generation family history: Thyroid cancer     Ultrasound findings: The ultrasound shows a fetus concordant with dates  The nuchal translucency appears normal   The nasal bone was not visualized due to fetal position  No malformations are seen on today's early ultrasound  The patient was informed of the findings and counseled about the limitations of the exam in detecting all forms of fetal congenital abnormalities  She does not report any vaginal bleeding or uterine cramping or contractions  Specific counseling was provided on the following problems:  1   We discussed the options for genetic screening which include invasive testing on the fetal placenta or on fetal skin cells within the amniotic fluid and compared this to noninvasive testing which includes cell free DNA screening and the sequential screen  We reviewed the risks, the benefits and the limitations of each  In the end patient chose to complete the cell free DNA screen  2  We reviewed her history of spontaneous  delivery, which increases her risk for  birth in this pregnancy  In women with a history of spontaneous  birth, treatment with progesterone therapy from 16-36 weeks gestation may reduce recurrence risk  Studies evaluating the effectiveness of progesterone therapy in reducing recurrence risk for  delivery have produced conflicting results regarding effectiveness, but have not suggested  harm with treatment  Progesterone can either be given as a weekly injection (IM or SQ) of 17-OH progesterone caproate, or as a vaginal suppository of 200mg daily  Either route of progesterone is a reasonable option endorsed by the Society for Maternal-Fetal Medicine  Serial cervical surveillance (by transvaginal sonography) is helpful in predicting risk of  delivery  Measurement of transvaginal cervical length is recommended between 16 0/7 and 23 6/7 weeks gestation  Cervical length equal to or less than 25mm before 24 weeks gestation would prompt recommendation for a transvaginal ultrasound-indicated cerclage  In women with a history of spontaneous  delivery, approximately 1/3 will require cerclage placement, and 2/3 will have normal cervical length screening and need progesterone therapy alone  Surveillance for signs and symptoms of  labor and rupture of membranes are recommended during the pregnancy  After our discussion, she plans to pursue cervical lengths and vaginal progesterone which was prescribed  3  Negra Kamara is a known carrier of cystic fibrosis and states her partner was tested in her prior pregnancy and was negative  Future tests recommended:  1   The results of her NIPT will return in 7-10 days and her OB office will order an MSAFP screen at 16-18 weeks to screen for spina bifida  2    Given her history of cholestasis a baseline bile acid level is recommended and ordered today  3  Recommend another vit D level in 6-8 weeks after supplementation started  Future ultrasounds ordered today:         1  Cervical lengths every 2 weeks beginning at 16           weeks until 23 weeks 6 days scheduled today  2   Fetal Level II ultrasound imaging is recommended at 19-20 weeks' gestation  Pre visit time reviewing her records   10 minutes  Face to face time 30 minutes  Post visit time on documentation of note, updating her problem list, adding orders and prescriptions 10 minutes  Procedures that were completed today were charged separately  The level of decision making was moderate complexity    Katie MITCHELL    I supervised the Advanced Practitioner, and we discussed the care of this patient after she saw the patient independently while I was present in the office  I reviewed the note and her ultrasound pictures and agree        Isabelle Best MD

## 2022-09-13 ENCOUNTER — TELEPHONE (OUTPATIENT)
Dept: GENETICS | Facility: CLINIC | Age: 27
End: 2022-09-13

## 2022-09-13 NOTE — TELEPHONE ENCOUNTER
Post-Test Genetic Counseling Consult Note    Patient is Ukrainian speaking  Mare Morales took place through [employer] [id number]  Today I spoke with [facility] over the phone to review the results of her genetic test for hereditary cancer  We met previously on 8/31/22 for pre-test counseling  A copy of this consult note and genetic test result will be shared with the patient  SUMMARY:    Test(s): [facility] Common Hereditary Cancers Panel- Customized (49 genes): APC, CHAN, AXIN2, BARD1, BMPR1A, BRCA1, BRCA2, BRIP1, CDH1, CDK4, CDKN2A, CHEK2, CTNNA1, DICER1, EPCAM, FH, GREM1, HOXB13, KIT, MEN1, MLH1, MSH2, MSH3, MSH6, MUTYH, NBN, NF1, NTHL1, PALB2, PDGFRA, PMS2, POLD1, POLE, PTEN, RAD50, RAD51C, RAD51D, RET, SDHA, SDHB, SDHC, SDHD, SMAD4, SMARCA4, STK11, TP53, TSC1, TSC2, VHL     Result: 2 Variants of uncertain significance     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); heterozygous; uncertain significance     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 (p [id number]); heterozygous; uncertain significance     Assessment: A variant of uncertain significance (VUS) means that a change was identified in a specific gene but it cannot be determined whether the variant is associated with an increased risk of cancer or is a harmless genetic change  It is possible that the variant was seen in only a handful of individuals, or there may be conflicting or incomplete information in the medical literature about the variant and its association with hereditary cancer  The significance of the MEN1 and MSH2 variants is currently not known and therefore this test result cannot be used to help determine [facility] cancer risks  Risks and Testing for Family Members:  Genetic testing for the MSH2 variant is not recommended for relatives who wish to determine their cancer risks for purposes of determining medical management   The presence or absence of this variant in a relative is not clinically meaningful unless the variant is reclassified in the future  [facility] is offering complimentary family studies for the MEN1 variant as part of the VUS Resolution Program   Familial VUS testing is recommended for informative family members if they are available  I spoke with Vinnie Apley, [facility] at [facility], and we agreed that at this time, it would be most beneficial to offer the testing to [facility] mother and father  I explained to the patient that testing family members is not guaranteed to result in a change in the variant's status, but it may provide additional evidence for future variant re-classification  Alfred Raines expressed interest in the VUS Resolution Program but mentioned that all her family members live in [facility] [place]   I informed the patient that genetic counseling is recommended for all individuals who pursue genetic testing  Her parents can follow up with a genetic counselor in [facility] [place] or their healthcare provider can schedule an appointment with Catina Batista, a [employer] counseling company that serves patient outside the [facility]  Although the familial testing is complimentary, there may be a fee associated with the genetic counseling session  I confirmed with [employer] that their sample collection kits can be sent to [facility] if one of their providers orders the testing  The patient was mailed a family letter with more specific instructions regarding this process  The laboratory will continue to accumulate information on these variants and will reclassify them as either a positive or negative genetic test result when they are confident that they have adequate information  As updated information is obtained, we will notify Alfred Raines  It is important to note that the majority of variants of uncertain significance are reclassified as likely benign or benign as additional information about the variant becomes available  Despite this result, Marika's first-degree relatives may be at increased risk for the cancers based on the family history   We recommend they discuss screening and management recommendations with their healthcare providers  If Saima Uribe has any affected family members with a cancer diagnosis, especially at a young age, they may still consider genetic testing  Relatives who wish to pursue genetic testing can reach out to the [phone]) to schedule an appointment or visit www [facility] org to identify a local genetic counselor  Risk Based on Family History:  The significance of these variants is currently not known and therefore this test result cannot be used to help determine Saima Uribe cancer risks  Rather her personal medical history and family history of cancer are the most important factors used to estimate her risk for developing certain cancers and to direct her medical management  Additional Information:  A healthy lifestyle will improve overall health and reduce risk for illness  Eating a healthy diet and exercising for 4 hours per week is recommended  Both diet and exercise have been shown to help maintain a healthy weight  Postmenopausal women who are overweight are at higher risk for breast cancer  Moderate to heavy alcohol use can increase the risk for some cancers  Smoking cigarettes can also increase risk for breast, lung, prostate, pancreatic and other cancers  Plan:   There are no additional recommendations based on Marika's negative result  she should continue cancer screening and medical management as clinically indicated and as determined appropriate by her healthcare providers  VUS Result: Saima Uribe was strongly encouraged to contact us regarding any changes in her personal or family history of cancer as these changes could alter our recommendation regarding genetic testing and/or cancer screening  Saima Uribe was also encouraged to follow up with us on an annual basis as variant classifications are subject to change

## 2022-09-15 ENCOUNTER — TELEPHONE (OUTPATIENT)
Dept: PERINATAL CARE | Facility: CLINIC | Age: 27
End: 2022-09-15

## 2022-09-15 NOTE — TELEPHONE ENCOUNTER
PC to Nitza Goldeneddie to review her NIPS results via  services ([id number])  She is aware of negative NIPS screen and she wanted to know the gender that resulted as female  MSAFP will be ordered by her OB between 16-18 weeks  She had no questions

## 2022-09-16 ENCOUNTER — TELEPHONE (OUTPATIENT)
Dept: GENETICS | Facility: CLINIC | Age: 27
End: 2022-09-16

## 2022-09-16 NOTE — TELEPHONE ENCOUNTER
Patient is [place]ian speaking and conversation took place using [place]  [id number]

## 2022-09-16 NOTE — TELEPHONE ENCOUNTER
Called the patient and confirmed that the lab [facility] and Catina Batista (a tele-genetic counseling company) can provide genetic counseling services to individuals outside the [address] [place] [address]

## 2022-09-20 ENCOUNTER — TELEPHONE (OUTPATIENT)
Dept: GENETICS | Facility: CLINIC | Age: 27
End: 2022-09-20

## 2022-09-20 NOTE — TELEPHONE ENCOUNTER
----- Message from 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 I sent the VUS Resolution program instructions to her the same way we'd send a family letter on [employer] because I wanted the letter head  If you are not able to access it I can send you a word doc via email  Thank you so so much!      GC Completed Chart     Result Type: VUS    Result Delivery: Mail    Monthly Review: Does not need monthly review- COMPLETE

## 2022-09-21 NOTE — PROGRESS NOTES
Please refer to the [facility] ultrasound report in Ob Procedures for additional information regarding today's visit

## 2022-09-22 ENCOUNTER — ROUTINE PRENATAL (OUTPATIENT)
Dept: PERINATAL CARE | Facility: CLINIC | Age: 27
End: 2022-09-22
Payer: MEDICARE

## 2022-09-22 VITALS
HEIGHT: 60 IN | DIASTOLIC BLOOD PRESSURE: 60 MMHG | BODY MASS INDEX: 20.97 KG/M2 | WEIGHT: 106.8 LBS | HEART RATE: 72 BPM | SYSTOLIC BLOOD PRESSURE: 106 MMHG

## 2022-09-22 DIAGNOSIS — O09.892 HISTORY OF PREMATURE DELIVERY, CURRENTLY PREGNANT, SECOND TRIMESTER: Primary | ICD-10-CM

## 2022-09-22 DIAGNOSIS — Z3A.15 15 WEEKS GESTATION OF PREGNANCY: ICD-10-CM

## 2022-09-22 PROCEDURE — 76817 TRANSVAGINAL US OBSTETRIC: CPT | Performed by: OBSTETRICS & GYNECOLOGY

## 2022-09-22 PROCEDURE — 76815 OB US LIMITED FETUS(S): CPT | Performed by: OBSTETRICS & GYNECOLOGY

## 2022-09-22 PROCEDURE — 99213 OFFICE O/P EST LOW 20 MIN: CPT | Performed by: OBSTETRICS & GYNECOLOGY

## 2022-09-22 RX ORDER — CHOLECALCIFEROL (VITAMIN D3) 25 MCG
CAPSULE ORAL
COMMUNITY
Start: 2022-09-22

## 2022-09-22 NOTE — PROGRESS NOTES
Ultrasound Probe Disinfection    A transvaginal ultrasound was performed  Prior to use, disinfection was performed with High Level Disinfection Process (Trophon)  Probe serial number B3: [id number] was used        Alvy Stage  09/22/22  12:57 PM

## 2022-09-22 NOTE — LETTER
September 23, 2022     [id number] [facility] PROVIDER    Patient: Jai Foster   YOB: 1995   Date of Visit: 9/22/2022       Dear   Provider: Thank you for referring Devin Paulino to me for evaluation  Below are my notes for this consultation  If you have questions, please do not hesitate to call me  I look forward to following your patient along with you  Sincerely,        Lauren Hopkins MD        CC: No Recipients  Lauren Hopkins MD  9/21/2022  8:35 AM  Sign when Signing Visit  Please refer to the [facility] ultrasound report in Ob Procedures for additional information regarding today's visit

## 2022-09-23 PROBLEM — O09.892 HISTORY OF PREMATURE DELIVERY, CURRENTLY PREGNANT, SECOND TRIMESTER: Status: ACTIVE | Noted: 2022-09-23

## 2022-09-26 DIAGNOSIS — Z34.92 PRENATAL CARE IN SECOND TRIMESTER: Primary | ICD-10-CM

## 2022-09-26 RX ORDER — PRENATAL WITH FERROUS FUM AND FOLIC ACID 3080; 920; 120; 400; 22; 1.84; 3; 20; 10; 1; 12; 200; 27; 25; 2 [IU]/1; [IU]/1; MG/1; [IU]/1; MG/1; MG/1; MG/1; MG/1; MG/1; MG/1; UG/1; MG/1; MG/1; MG/1; MG/1
1 TABLET ORAL DAILY
Qty: 30 TABLET | Refills: 11 | Status: SHIPPED | OUTPATIENT
Start: 2022-09-26

## 2022-10-04 ENCOUNTER — ROUTINE PRENATAL (OUTPATIENT)
Dept: OBGYN CLINIC | Facility: CLINIC | Age: 27
End: 2022-10-04

## 2022-10-04 VITALS
HEIGHT: 60 IN | BODY MASS INDEX: 21.4 KG/M2 | WEIGHT: 109 LBS | DIASTOLIC BLOOD PRESSURE: 67 MMHG | SYSTOLIC BLOOD PRESSURE: 111 MMHG | HEART RATE: 75 BPM

## 2022-10-04 DIAGNOSIS — Z3A.09 9 WEEKS GESTATION OF PREGNANCY: ICD-10-CM

## 2022-10-04 DIAGNOSIS — Z3A.17 17 WEEKS GESTATION OF PREGNANCY: Primary | ICD-10-CM

## 2022-10-04 PROCEDURE — 99213 OFFICE O/P EST LOW 20 MIN: CPT | Performed by: OBSTETRICS & GYNECOLOGY

## 2022-10-04 RX ORDER — PNV NO.95/FERROUS FUM/FOLIC AC 28MG-0.8MG
1 TABLET ORAL DAILY
Qty: 90 TABLET | Refills: 2 | Status: SHIPPED | OUTPATIENT
Start: 2022-10-04

## 2022-10-04 RX ORDER — DOCUSATE SODIUM 100 MG/1
100 CAPSULE, LIQUID FILLED ORAL 2 TIMES DAILY
Qty: 60 CAPSULE | Refills: 3 | Status: SHIPPED | OUTPATIENT
Start: 2022-10-04 | End: 2022-11-03

## 2022-10-04 NOTE — PROGRESS NOTES
[address] [facility]  [id number]  1995    ASSESSMENT/PLAN:  Problem List        Digestive    External hemorrhoids       Other    Herpes simplex infection    Overview     Will need Valtrex suppression at term         Cystic fibrosis carrier    Overview     Patient reports partner tested in prior pregnancy and was not a carrier  Status post parathyroidectomy ([address] )    Overview     Unilateral on the right for an adenoma which was benign  Being worked up for genetic predisposition for cancer with  and results are pending the end of September  History of cholestasis during pregnancy    Overview     History of cholestasis in last pregnancy  Baseline bile acid level ordered at 13 weeks  H/O  delivery, currently pregnant    Overview     Delivery at 28 weeks after presenting in  labor, outlet VAVD, with ML episiotomy  Recommend vaginal progesterone and serial TVS for cervical lengths         Rubella non-immune status, antepartum    Overview     Rubella equivocal  Will require MMR after delivery         Vitamin D deficiency    Overview     Started on vit d 2000 iu on 22 for level of 12  Recommend rescreening with another level in 6-8 weeks  History of premature delivery, currently pregnant, second trimester    17 weeks gestation of pregnancy    Overview     - Hx  delivery at 27 weeks  Follows closely with MFM, vaginal progesterone and cervical lengths  Upcoming anatomy scan  - AFP ordered  - C/o abd discomfort/ Urine Dip negative on 10/4/22  - Pt complaining of constipation, Has not been taking her Colace,  - Reordered Colace and prenatal today  - RTO in 4 weeks  Other Visit Diagnoses     9 weeks gestation of pregnancy            Subjective: 24 y o  Josi Ortega 17w3d here for prenatal visit  She denies contractions  She denies leakage of fluid and vaginal bleeding   She endorses good fetal movement  She is complaining of 7/10 diffuse abdominal pain for past 3 hrs  Describes her pain as a "colicky cramping " discomfort that she cannot localize  The pain has wax and waned since that time  No exacerbating or mitigating factors  Admits to constipation, last BM yesterday  Denies blood in stool  Reports straining with BM's, but has not been taking her colace  Denies fever, chills, chest pain, shortness of breath, dysuria, hematuria, diarrhea, or any other complaints at this time  Objective:  Pre- Vitals    Flowsheet Row Most Recent Value   Prenatal Assessment    Prenatal Vitals    Blood Pressure 111/67   Weight - Scale 49 4 kg (109 lb)   Urine Albumin/Glucose    Dilation/Effacement/Station    Vaginal Drainage    Edema          Pregnancy Plan:     Delivery Plans  Planned delivery method: Vaginal  Planned delivery location: AN L&D  Planned anesthesia: None  Acceptable blood products: All     Post-Delivery Plans  Feeding intentions: Breast Milk and Non-human milk substitute    General: Well appearing, no distress  Respiratory: Unlabored breathing, no wheezing  Cardiovascular: Regular rate  regular rhythm   Abdomen: Soft, gravid, minimal tenderness to deep palpation of suprapubic region  No other abdominal tenderness appreciated  Back: No CVA tenderness bilaterally, diffuse tenderness to palpation of lumbar spine  Fundal Height: Appropriate for gestational age  Extremities: Warm and well perfused  Non tender      D/w Dr Iesha Omer  OB/GYN, PGY-1

## 2022-10-06 ENCOUNTER — ROUTINE PRENATAL (OUTPATIENT)
Dept: PERINATAL CARE | Facility: CLINIC | Age: 27
End: 2022-10-06
Payer: MEDICARE

## 2022-10-06 VITALS
SYSTOLIC BLOOD PRESSURE: 90 MMHG | BODY MASS INDEX: 21.05 KG/M2 | HEIGHT: 60 IN | WEIGHT: 107.2 LBS | DIASTOLIC BLOOD PRESSURE: 58 MMHG | HEART RATE: 69 BPM

## 2022-10-06 DIAGNOSIS — O09.899 H/O PRETERM DELIVERY, CURRENTLY PREGNANT: ICD-10-CM

## 2022-10-06 DIAGNOSIS — Z87.19 HISTORY OF CHOLESTASIS DURING PREGNANCY: ICD-10-CM

## 2022-10-06 DIAGNOSIS — Z3A.17 17 WEEKS GESTATION OF PREGNANCY: ICD-10-CM

## 2022-10-06 DIAGNOSIS — Z87.59 HISTORY OF CHOLESTASIS DURING PREGNANCY: ICD-10-CM

## 2022-10-06 PROCEDURE — 76815 OB US LIMITED FETUS(S): CPT | Performed by: OBSTETRICS & GYNECOLOGY

## 2022-10-06 PROCEDURE — 99213 OFFICE O/P EST LOW 20 MIN: CPT | Performed by: OBSTETRICS & GYNECOLOGY

## 2022-10-06 PROCEDURE — 76817 TRANSVAGINAL US OBSTETRIC: CPT | Performed by: OBSTETRICS & GYNECOLOGY

## 2022-10-06 NOTE — PROGRESS NOTES
The patient was seen today for an ultrasound  Please see ultrasound report (located under Ob Procedures) for additional details  Thank you very much for allowing us to participate in the care of this very nice patient  Should you have any questions, please do not hesitate to contact me  Matthew Pryor MD [id number]  Attending Physician, [place]

## 2022-10-06 NOTE — PROGRESS NOTES
Ultrasound Probe Disinfection    A transvaginal ultrasound was performed  Prior to use, disinfection was performed with High Level Disinfection Process (Trophon)  Probe serial number [id number]: [id number] was used        Maximino Hernandez  10/06/22  9:59 AM

## 2022-10-26 ENCOUNTER — ROUTINE PRENATAL (OUTPATIENT)
Dept: PERINATAL CARE | Facility: OTHER | Age: 27
End: 2022-10-26
Payer: MEDICARE

## 2022-10-26 VITALS
HEIGHT: 60 IN | SYSTOLIC BLOOD PRESSURE: 112 MMHG | HEART RATE: 90 BPM | BODY MASS INDEX: 21.44 KG/M2 | WEIGHT: 109.2 LBS | DIASTOLIC BLOOD PRESSURE: 62 MMHG

## 2022-10-26 DIAGNOSIS — Z3A.20 20 WEEKS GESTATION OF PREGNANCY: ICD-10-CM

## 2022-10-26 DIAGNOSIS — Z36.3 ENCOUNTER FOR ANTENATAL SCREENING FOR MALFORMATION: Primary | ICD-10-CM

## 2022-10-26 DIAGNOSIS — E89.2 STATUS POST PARATHYROIDECTOMY (HCC): ICD-10-CM

## 2022-10-26 DIAGNOSIS — O09.899 H/O PRETERM DELIVERY, CURRENTLY PREGNANT: ICD-10-CM

## 2022-10-26 DIAGNOSIS — Z36.86 ENCOUNTER FOR ANTENATAL SCREENING FOR CERVICAL LENGTH: ICD-10-CM

## 2022-10-26 PROCEDURE — 76805 OB US >/= 14 WKS SNGL FETUS: CPT | Performed by: STUDENT IN AN ORGANIZED HEALTH CARE EDUCATION/TRAINING PROGRAM

## 2022-10-26 PROCEDURE — 76817 TRANSVAGINAL US OBSTETRIC: CPT | Performed by: STUDENT IN AN ORGANIZED HEALTH CARE EDUCATION/TRAINING PROGRAM

## 2022-10-26 NOTE — LETTER
2022     Evans Abarca, [address] [address]    Patient: Darla Homans   YOB: 1995   Date of Visit: 10/26/2022       Dear Dr Tash Partida: Thank you for referring Barbara Gutierrez to me for evaluation  Below are my notes for this consultation  If you have questions, please do not hesitate to call me  I look forward to following your patient along with you  Sincerely,        Myron Steve MD        CC: No Recipients  Myron Steve MD  10/27/2022 10:03 AM  Sign when Signing Visit  [id number] [facility]: Ms Lei November was seen today for anatomic survey and cervical length screening ultrasound  See ultrasound report under "OB Procedures" tab  MDM:   I  Diagnoses/Problems addressed:  Stable chronic illness: vitamin D deficiency and Acute uncomplicated illness/injury: prior  birth (ex  GDMA1, MO, UTI)  II  Data: I reviewed 3 lab tests ordered by another provider  and I ordered the following tests: CMP, vitamin D level  III  Risk of morbidity: minimal    Please don't hesitate to contact our office with any concerns or questions    -Myron Steve

## 2022-10-26 NOTE — PROGRESS NOTES
Ultrasound Probe Disinfection    A transvaginal ultrasound was performed  Prior to use, disinfection was performed with High Level Disinfection Process (Trophon)  Probe serial number [id number]: [id number] was used        Jeny Whitt  10/26/22  2:26 PM

## 2022-10-27 NOTE — PROGRESS NOTES
[id number] [facility]: Ms Anthony Russ was seen today for anatomic survey and cervical length screening ultrasound  See ultrasound report under "OB Procedures" tab  MDM:   I  Diagnoses/Problems addressed:  Stable chronic illness: vitamin D deficiency and Acute uncomplicated illness/injury: prior  birth (ex  GDMA1, MO, UTI)  II  Data: I reviewed 3 lab tests ordered by another provider  and I ordered the following tests: CMP, vitamin D level  III  Risk of morbidity: minimal    Please don't hesitate to contact our office with any concerns or questions    -Nick Menezes

## 2022-11-01 ENCOUNTER — ROUTINE PRENATAL (OUTPATIENT)
Dept: OBGYN CLINIC | Facility: CLINIC | Age: 27
End: 2022-11-01

## 2022-11-01 VITALS
SYSTOLIC BLOOD PRESSURE: 107 MMHG | WEIGHT: 112 LBS | BODY MASS INDEX: 21.99 KG/M2 | HEART RATE: 77 BPM | HEIGHT: 60 IN | DIASTOLIC BLOOD PRESSURE: 56 MMHG

## 2022-11-01 DIAGNOSIS — Z23 NEED FOR INFLUENZA VACCINATION: Primary | ICD-10-CM

## 2022-11-01 DIAGNOSIS — Z3A.21 21 WEEKS GESTATION OF PREGNANCY: ICD-10-CM

## 2022-11-01 DIAGNOSIS — Z34.92 PRENATAL CARE IN SECOND TRIMESTER: ICD-10-CM

## 2022-11-01 NOTE — PROGRESS NOTES
[address] [facility]  [id number]  1995    ASSESSMENT/PLAN:  Problem List        Digestive    External hemorrhoids       Other    Herpes simplex infection    Overview     Will need Valtrex suppression at term         Cystic fibrosis carrier    Overview     Patient reports partner tested in prior pregnancy and was not a carrier  Status post parathyroidectomy ([address] )    Overview     Unilateral on the right for an adenoma which was benign  Being worked up for genetic predisposition for cancer with  and results are pending the end of September  History of cholestasis during pregnancy    Overview     History of cholestasis in last pregnancy  Baseline bile acid level ordered at 13 weeks  Pt has not completed as of          H/O  delivery, currently pregnant    Overview     Delivery at 28 weeks after presenting in  labor, outlet VAVD, with ML episiotomy  Recommend vaginal progesterone and serial TVS for cervical lengths  Pt has not yet started her vaginal progesterone as of   Plans to  from pharmacy after appt         Rubella non-immune status, antepartum    Overview     Rubella equivocal  Will require MMR after delivery         Vitamin D deficiency    Overview     Started on vit d 2000 iu on 22 for level of 12  Recommend rescreening with another level in 6-8 weeks  History of premature delivery, currently pregnant, second trimester    21 weeks gestation of pregnancy    Overview     - Hx  delivery at 27 weeks  Follows closely with MFM, vaginal progesterone and cervical lengths   - NIPS negative  Pt did not complete AFP  - 10/26: 20 week scan  HC noted <5%  - RTO in 4 weeks  Subjective: 24 y o  [id number] 21w3d here for prenatal visit  She denies contractions  She denies leakage of fluid and vaginal bleeding  She endorses good fetal movement   Pt has not yet started vaginal progesterone, but will be picking up prescription after her appointment this afternoon  She reports some post nasal drip last week that has since resolved  She would like to receive her flu shot today  Objective:    FHT: 143 bpm  Fundal Height: 20 cm      Pregnancy Plan:     Delivery Plans  Planned delivery method: Vaginal  Planned delivery location: AN L&D  Planned anesthesia: None  Acceptable blood products: All     Post-Delivery Plans  Feeding intentions: Breast Milk and Non-human milk substitute    General: Well appearing, no distress  Respiratory: Unlabored breathing  Cardiovascular: Regular rate  Abdomen: Soft, gravid, nontender  Fundal Height: Appropriate for gestational age  Extremities: Warm and well perfused  Non tender          D/w Dr Candace Somers    12-14weeks: COVID vaccination visitor policy, genetic screening classes  16-18 weeks: sequential screening, level II ultrasound order, flu vaccine  26-28 weeks: 28 week labs (CBC, RPR, 1hr GTT), Rh status/rhogam, FKC, flu vaccine  28-32 weeks: delivery counseling, sign Ma31, tdap, flu vaccine  32 weeks: tdap, flu vaccine, check in card, rediscuss contraception, birth plan  36 weeks: collect GBS/PCN allergy, flu vaccine, SVE

## 2022-11-09 ENCOUNTER — ROUTINE PRENATAL (OUTPATIENT)
Dept: PERINATAL CARE | Facility: CLINIC | Age: 27
End: 2022-11-09

## 2022-11-09 VITALS
WEIGHT: 111.8 LBS | HEIGHT: 60 IN | SYSTOLIC BLOOD PRESSURE: 112 MMHG | BODY MASS INDEX: 21.95 KG/M2 | DIASTOLIC BLOOD PRESSURE: 60 MMHG | HEART RATE: 96 BPM

## 2022-11-09 DIAGNOSIS — Z14.1 CYSTIC FIBROSIS CARRIER: ICD-10-CM

## 2022-11-09 DIAGNOSIS — Z71.85 VACCINE COUNSELING: ICD-10-CM

## 2022-11-09 DIAGNOSIS — E89.2 STATUS POST PARATHYROIDECTOMY (HCC): ICD-10-CM

## 2022-11-09 DIAGNOSIS — O09.892 HISTORY OF PREMATURE DELIVERY, CURRENTLY PREGNANT, SECOND TRIMESTER: ICD-10-CM

## 2022-11-09 DIAGNOSIS — Z3A.22 22 WEEKS GESTATION OF PREGNANCY: Primary | ICD-10-CM

## 2022-11-09 NOTE — LETTER
November 18, 2022     Pedro Arango, [address] [address]    Patient: Corinne Paci   YOB: 1995   Date of Visit: 11/9/2022       Dear Dr Francisca Aguila: Thank you for referring Glorious Ashing to me for evaluation  Below are my notes for this consultation  If you have questions, please do not hesitate to call me  I look forward to following your patient along with you  Sincerely,        Saulo Man MD        CC: No Recipients  Saulo Man MD  11/18/2022  6:23 AM  Sign when Signing Visit  A fetal ultrasound was completed  See Ob procedures in Epic for an interpretation and recommendations  Do not hesitate to contact us in [facility] if you have questions  Marisol Salvador MD, [address] [place] [address]  Maternal Fetal Medicine

## 2022-11-18 PROBLEM — Z71.85 VACCINE COUNSELING: Status: ACTIVE | Noted: 2022-11-18

## 2022-11-18 PROBLEM — Z3A.22 22 WEEKS GESTATION OF PREGNANCY: Status: ACTIVE | Noted: 2022-10-04

## 2022-11-18 NOTE — PROGRESS NOTES
A fetal ultrasound was completed  See Ob procedures in Epic for an interpretation and recommendations  Do not hesitate to contact us in [facility] if you have questions  Janine Cee MD, [address] [facility]  Maternal Fetal Medicine

## 2022-11-28 NOTE — PROGRESS NOTES
[address] [facility]  [id number]  1995    ASSESSMENT/PLAN:  Problem List        Digestive    External hemorrhoids       Other    Herpes simplex infection    Overview     Will need Valtrex suppression at term         Cystic fibrosis carrier    Overview     Patient reports partner tested in prior pregnancy and was not a carrier  Status post parathyroidectomy ([address] )    Overview     Unilateral on the right for an adenoma which was benign  Being worked up for genetic predisposition for cancer with  and results are pending the end of September  History of cholestasis during pregnancy    Overview     History of cholestasis in last pregnancy  Baseline bile acid level ordered at 13 weeks  Pt has not completed as of          H/O  delivery, currently pregnant    Overview     Delivery at 28 weeks after presenting in  labor, outlet VAVD, with ML episiotomy  Recommend vaginal progesterone and serial TVS for cervical lengths  Pt has not yet started her vaginal progesterone as of   Plans to  from pharmacy after appt         Rubella non-immune status, antepartum    Overview     Rubella equivocal  Will require MMR after delivery         Vitamin D deficiency    Overview     Started on vit d 2000 iu on 22 for level of 12  Recommend rescreening with another level in 6-8 weeks  History of premature delivery, currently pregnant, second trimester    25 weeks gestation of pregnancy    Overview     - Hx  delivery at 27 weeks  Follows closely with MFM, cotninue with vaginal progesterone and cervical lengths   - NIPS negative  Pt did not complete AFP  Order cancelled    - 10/26: 20 week scan  HC noted <5%  - 28 week labs ordered  - reported pain with progesterone suppository insertion  Education regarding perineal massage provided   Encouraged her to continue with vaginal progesterone   - RTO in 4 weeks  Vaccine counseling     Subjective: 24 y o  [id number] 25w1d here for prenatal visit  Reports that she is having pain with progesterone for the past 2 days  Reports her vagina is feeling very sensitive  She has been taking it nightly for the past 3 weeks  Reports feeling a vaginal lump  She reports contractions when using her progesterone that last a 2-3 minutes at a time  She denies leakage of fluid and vaginal bleeding  She endorses good fetal movement  Objective:     Vitals:    11/29/22 1600   BP: 107/71   Pulse: 76      FHT: 146 bpm  Fundal Height: 25 cm    Pregnancy Plan:     Delivery Plans  Planned delivery method: Vaginal  Planned delivery location: AN L&D  Planned anesthesia: None  Acceptable blood products: All     Post-Delivery Plans  Feeding intentions: Breast Milk and Non-human milk substitute    General: Well appearing, no distress  Respiratory: Unlabored breathing  Cardiovascular: Regular rate  Abdomen: Soft, gravid, nontender  Fundal Height: Appropriate for gestational age  : Varicosity noted on left labia  Tenderness to palpation of left sided perineal muscles, no purulent discharge noted on examination  Extremities: Warm and well perfused  Non tender      D/w Dr Tyler Diallo, who evaluated pt with me and was presented to chaperone exam     Gabriela Kawasaki, DO    12-14weeks: COVID vaccination visitor policy, genetic screening classes  16-18 weeks: sequential screening, level II ultrasound order, flu vaccine  26-28 weeks: 28 week labs (CBC, RPR, 1hr GTT), Rh status/rhogam, FKC, flu vaccine  28-32 weeks: delivery counseling, sign Ma31, tdap, flu vaccine  32 weeks: tdap, flu vaccine, check in card, rediscuss contraception, birth plan  36 weeks: collect GBS/PCN allergy, flu vaccine, SVE

## 2022-11-29 ENCOUNTER — ROUTINE PRENATAL (OUTPATIENT)
Dept: OBGYN CLINIC | Facility: CLINIC | Age: 27
End: 2022-11-29

## 2022-11-29 VITALS
BODY MASS INDEX: 22.58 KG/M2 | HEART RATE: 76 BPM | HEIGHT: 60 IN | DIASTOLIC BLOOD PRESSURE: 71 MMHG | SYSTOLIC BLOOD PRESSURE: 107 MMHG | WEIGHT: 115 LBS

## 2022-11-29 DIAGNOSIS — Z3A.25 25 WEEKS GESTATION OF PREGNANCY: Primary | ICD-10-CM

## 2022-12-18 NOTE — PROGRESS NOTES
Please refer to the [facility] ultrasound report in Ob Procedures for additional information regarding today's visit

## 2022-12-19 ENCOUNTER — ULTRASOUND (OUTPATIENT)
Dept: PERINATAL CARE | Facility: OTHER | Age: 27
End: 2022-12-19

## 2022-12-19 VITALS
BODY MASS INDEX: 23.16 KG/M2 | DIASTOLIC BLOOD PRESSURE: 56 MMHG | WEIGHT: 118 LBS | HEART RATE: 85 BPM | HEIGHT: 60 IN | SYSTOLIC BLOOD PRESSURE: 90 MMHG

## 2022-12-19 DIAGNOSIS — O09.893 HISTORY OF PRETERM DELIVERY, CURRENTLY PREGNANT, THIRD TRIMESTER: Primary | ICD-10-CM

## 2022-12-19 DIAGNOSIS — Z3A.28 28 WEEKS GESTATION OF PREGNANCY: ICD-10-CM

## 2022-12-19 DIAGNOSIS — Z36.4 ULTRASOUND FOR ANTENATAL SCREENING FOR FETAL GROWTH RESTRICTION: ICD-10-CM

## 2022-12-19 NOTE — LETTER
December 19, 2022     [address] [facility] PROVIDER    Patient: Soo Rob   YOB: 1995   Date of Visit: 12/19/2022       Dear   Provider: Thank you for referring Benito Shea to me for evaluation  Below are my notes for this consultation  If you have questions, please do not hesitate to call me  I look forward to following your patient along with you           Sincerely,        Sarah Zelaya MD        CC: No Recipients  Sarah Zelaya MD  12/18/2022  3:19 PM  Sign when Signing Visit  Please refer to the [facility] ultrasound report in Ob Procedures for additional information regarding today's visit

## 2022-12-19 NOTE — PATIENT INSTRUCTIONS
Conteo de patadas en el embarazo   LO QUE NECESITA SABER:   El conteo de patadas mide cuánto se está moviendo marie bebé en el útero  Deb patada de marie bebé podría sentirse julia deb torcedura, deb vuelta, un crujido, un meneo o un golpe  Es común sentir a tu bebé patear a las 32 a 29 semanas de [place]  Es posible que sienta al bebé patear ya a las 20 semanas de [place]  Puede que desee empezar a contar a las 28 semanas  INSTRUCCIONES SOBRE EL ROXANA HOSPITALARIA:   Comuníquese con marie médico de inmediato si:  Usted siente un cambio en el número de patadas o movimientos de marie bebé  Siente menos de 10 patadas en 2 horas  Usted tiene preguntas o inquietudes acerca de los movimientos de marie bebé  Por qué realizar el conteo de patadas: Los movimientos de marie bebé podrían proporcionar información de la buddy de marie bebé  Es posible que si hay problemas, marie bebé se mueva menos o nada en lo absoluto  El bebé podría moverse menos si no recibe suficiente oxígeno o alimento de la placenta  No fume mientras está embarazada  Fumar disminuye la cantidad de oxígeno que llega a marie bebé  Hable con marie médico si necesita ayuda para dejar de fumar  Los problemas que se encuentran en deb etapa más temprana son más fáciles de tratar  Cuándo realizar el conteo de patadas:  Cuente las patadas en el mismo horario todos los GRASSE  Realice el conteo de las patadas cuando marie bebé esté despierto y [place]  Marie bebé podría estar más activo en la tarde  Cómo realizar el conteo de patadas: Revise que marie bebé esté despierto antes de realizar el conteo de patadas  Usted puede despertar a marie bebé empujando marie estómago suavemente, caminando o tomando algo frío  Marie médico podría indicarle diferentes maneras de realizar el conteo  Es posible que le indique que connie lo siguiente:  Use deb gráfica o un reloj para mantener un registro de la hora en que comienza y termina de contar      Siéntese en deb silla o acuéstese en marie costado derecho  Coloque sheila lucy en la parte más paul de marie BJURHOLM  Cuente hasta que llegue a las 10 patadas  Escriba cuánto tiempo le lleva contar las 10 patadas  Podría chester de 30 minutos a 2 horas para contar 10 patadas  No debería de chester más de 2 horas para contar 10 patadas  Acuda a la consulta de control con marie médico según las indicaciones: Anote sheila preguntas para que se acuerde de hacerlas mauri sheila visitas  © Copyright [employer] 2022 Information is for End User's use only and may not be sold, redistributed or otherwise used for commercial purposes  All illustrations and images included in CareNotes® are the copyrighted property of A D A M , Inc  or [address] [place] [address] es sólo para uso en educación  Marie intención no es darle un consejo médico sobre enfermedades o tratamientos  Colsulte con marie Qasim Cassette farmacéutico antes de seguir cualquier régimen médico para saber si es seguro y efectivo para usted

## 2022-12-20 ENCOUNTER — ROUTINE PRENATAL (OUTPATIENT)
Dept: OBGYN CLINIC | Facility: CLINIC | Age: 27
End: 2022-12-20

## 2022-12-20 VITALS
WEIGHT: 119 LBS | SYSTOLIC BLOOD PRESSURE: 100 MMHG | HEART RATE: 78 BPM | BODY MASS INDEX: 23.36 KG/M2 | DIASTOLIC BLOOD PRESSURE: 67 MMHG | HEIGHT: 60 IN

## 2022-12-20 DIAGNOSIS — B00.9 HERPES SIMPLEX INFECTION: ICD-10-CM

## 2022-12-20 DIAGNOSIS — O09.899 RUBELLA NON-IMMUNE STATUS, ANTEPARTUM: ICD-10-CM

## 2022-12-20 DIAGNOSIS — K21.9 GASTROESOPHAGEAL REFLUX DISEASE WITHOUT ESOPHAGITIS: ICD-10-CM

## 2022-12-20 DIAGNOSIS — Z34.93 PRENATAL CARE IN THIRD TRIMESTER: Primary | ICD-10-CM

## 2022-12-20 DIAGNOSIS — Z3A.28 28 WEEKS GESTATION OF PREGNANCY: ICD-10-CM

## 2022-12-20 DIAGNOSIS — K64.4 EXTERNAL HEMORRHOIDS: ICD-10-CM

## 2022-12-20 DIAGNOSIS — E89.2 STATUS POST PARATHYROIDECTOMY (HCC): ICD-10-CM

## 2022-12-20 DIAGNOSIS — Z87.59 HISTORY OF CHOLESTASIS DURING PREGNANCY: ICD-10-CM

## 2022-12-20 DIAGNOSIS — Z28.39 RUBELLA NON-IMMUNE STATUS, ANTEPARTUM: ICD-10-CM

## 2022-12-20 DIAGNOSIS — Z87.19 HISTORY OF CHOLESTASIS DURING PREGNANCY: ICD-10-CM

## 2022-12-20 DIAGNOSIS — O09.899 H/O PRETERM DELIVERY, CURRENTLY PREGNANT: ICD-10-CM

## 2022-12-20 NOTE — PROGRESS NOTES
[address] [facility]  [id number]  1995        A/P:  Problem List        Digestive    External hemorrhoids    Overview     Severe pain after previous delivery  Taking colace but still straining, encouraged addition of miralax and increasing water intake         GERD (gastroesophageal reflux disease)    Current Assessment & Plan     Using TUMS as needed            Other    Herpes simplex infection    Overview     Will need Valtrex suppression at 36 weeks         Cystic fibrosis carrier    Overview     Patient reports partner tested in prior pregnancy and was not a carrier  Status [address] [address] )    Overview     Unilateral on the right for an adenoma which was benign  Being worked up for genetic predisposition for cancer with  and results are pending the end of September  History of cholestasis during pregnancy    Overview     History of cholestasis in last pregnancy  Baseline bile acid level ordered at 13 weeks  Pt has not completed as of          H/O  delivery, currently pregnant    Overview     Delivery at 35 weeks after presenting in  labor, outlet VAVD, with ML episiotomy  Continuing to use vaginal progesterone         Rubella non-immune status, antepartum    Overview     Rubella equivocal  Will require MMR after delivery         Vitamin D deficiency    Overview     Started on vit d 2000 iu on 22 for level of 12  Recommend rescreening with another level in 6-8 weeks  History of premature delivery, currently pregnant, second trimester    28 weeks gestation of pregnancy    Overview     · Hx  delivery at 27 weeks  Follows closely with MFM, cotninue with vaginal progesterone and cervical lengths  · NIPS negative  Pt did not complete AFP  Order cancelled   · 10/26: 20 week scan   HC noted <5%  · : growth scan improved, f/up growth scan scheduled for 36 weeks  · 28 week labs ordered, not yet completed  · Delivery consent signed 12/20/22  · TDAP given 12/20/22  · Continue with vaginal progesterone until 36 weeks  · RTO in 4 weeks  Vaccine counseling   Other Visit Diagnoses     Prenatal care in third trimester    -  Primary              S: 24 y alexander Bunn 28w3d here for PN visit  She denies contractions  She denies leakage of fluid and vaginal bleeding  She has felt good fetal movement  She said that she went to go get her labs drawn but didn't know she needed to wait 1 hour for the GTT so she will go back ASAP  O:  Vitals:    12/20/22 1600   BP: 100/67   Pulse: 78     Physical Exam  GEN: The patient was alert and oriented x3, pleasant well-appearing female in no acute distress     CV: Regular rate  PULM: Non-labored respirations  MSK: Normal gait  Skin: Warm, dry  Neuro: No focal deficits  Psych: Normal affect and judgement, cooperative    Fetal Heart Rate: 150       D/w Dr Kathyrn Kussmaul, MD  OB/GYN PGY-2  12/21/2022  9:34 AM

## 2022-12-20 NOTE — PROGRESS NOTES
28 week education packet provided to patient on 12/20/22  Included in packet: Third Trimester paperwork  Delivery consent   Birthing room support person rules and acknowledgment  Birth Plan   Welcome information  Birth certificate worksheet   Consent for Photographers  Perineal/ Vaginal massage   Pediatric practices and location  Breast pump ordered today  TDAP given   [employer]: [id number] rooming pt

## 2022-12-21 LAB
DME PARACHUTE DELIVERY DATE REQUESTED: NORMAL
DME PARACHUTE ITEM DESCRIPTION: NORMAL
DME PARACHUTE ORDER STATUS: NORMAL
DME PARACHUTE SUPPLIER NAME: NORMAL
DME PARACHUTE SUPPLIER PHONE: NORMAL

## 2022-12-27 ENCOUNTER — APPOINTMENT (OUTPATIENT)
Dept: LAB | Facility: CLINIC | Age: 27
End: 2022-12-27

## 2022-12-27 DIAGNOSIS — Z87.19 HISTORY OF CHOLESTASIS DURING PREGNANCY: ICD-10-CM

## 2022-12-27 DIAGNOSIS — E89.2 STATUS POST PARATHYROIDECTOMY (HCC): ICD-10-CM

## 2022-12-27 DIAGNOSIS — Z87.59 HISTORY OF CHOLESTASIS DURING PREGNANCY: ICD-10-CM

## 2022-12-27 DIAGNOSIS — Z3A.25 25 WEEKS GESTATION OF PREGNANCY: ICD-10-CM

## 2022-12-27 LAB
25(OH)D3 SERPL-MCNC: 30.4 NG/ML (ref 30–100)
ALBUMIN SERPL BCP-MCNC: 2.9 G/DL (ref 3.5–5)
ALP SERPL-CCNC: 93 U/L (ref 46–116)
ALT SERPL W P-5'-P-CCNC: 20 U/L (ref 12–78)
ANION GAP SERPL CALCULATED.3IONS-SCNC: 6 MMOL/L (ref 4–13)
AST SERPL W P-5'-P-CCNC: 15 U/L (ref 5–45)
BASOPHILS # BLD AUTO: 0.03 THOUSANDS/ÂΜL (ref 0–0.1)
BASOPHILS NFR BLD AUTO: 0 % (ref 0–1)
BILIRUB SERPL-MCNC: 0.24 MG/DL (ref 0.2–1)
BUN SERPL-MCNC: 10 MG/DL (ref 5–25)
CALCIUM ALBUM COR SERPL-MCNC: 10.2 MG/DL (ref 8.3–10.1)
CALCIUM SERPL-MCNC: 9.3 MG/DL (ref 8.3–10.1)
CHLORIDE SERPL-SCNC: 107 MMOL/L (ref 96–108)
CO2 SERPL-SCNC: 26 MMOL/L (ref 21–32)
CREAT SERPL-MCNC: 0.52 MG/DL (ref 0.6–1.3)
EOSINOPHIL # BLD AUTO: 0.12 THOUSAND/ÂΜL (ref 0–0.61)
EOSINOPHIL NFR BLD AUTO: 1 % (ref 0–6)
ERYTHROCYTE [DISTWIDTH] IN BLOOD BY AUTOMATED COUNT: 12.7 % (ref 11.6–15.1)
GFR SERPL CREATININE-BSD FRML MDRD: 133 ML/MIN/1.73SQ M
GLUCOSE 1H P 50 G GLC PO SERPL-MCNC: 132 MG/DL (ref 40–134)
GLUCOSE SERPL-MCNC: 124 MG/DL (ref 65–140)
HCT VFR BLD AUTO: 35 % (ref 34.8–46.1)
HGB BLD-MCNC: 11.7 G/DL (ref 11.5–15.4)
IMM GRANULOCYTES # BLD AUTO: 0.05 THOUSAND/UL (ref 0–0.2)
IMM GRANULOCYTES NFR BLD AUTO: 1 % (ref 0–2)
LYMPHOCYTES # BLD AUTO: 2.04 THOUSANDS/ÂΜL (ref 0.6–4.47)
LYMPHOCYTES NFR BLD AUTO: 24 % (ref 14–44)
MCH RBC QN AUTO: 32.2 PG (ref 26.8–34.3)
MCHC RBC AUTO-ENTMCNC: 33.4 G/DL (ref 31.4–37.4)
MCV RBC AUTO: 96 FL (ref 82–98)
MONOCYTES # BLD AUTO: 0.63 THOUSAND/ÂΜL (ref 0.17–1.22)
MONOCYTES NFR BLD AUTO: 7 % (ref 4–12)
NEUTROPHILS # BLD AUTO: 5.77 THOUSANDS/ÂΜL (ref 1.85–7.62)
NEUTS SEG NFR BLD AUTO: 67 % (ref 43–75)
NRBC BLD AUTO-RTO: 0 /100 WBCS
PLATELET # BLD AUTO: 268 THOUSANDS/UL (ref 149–390)
PMV BLD AUTO: 9.5 FL (ref 8.9–12.7)
POTASSIUM SERPL-SCNC: 3.6 MMOL/L (ref 3.5–5.3)
PROT SERPL-MCNC: 7 G/DL (ref 6.4–8.4)
RBC # BLD AUTO: 3.63 MILLION/UL (ref 3.81–5.12)
SODIUM SERPL-SCNC: 139 MMOL/L (ref 135–147)
WBC # BLD AUTO: 8.64 THOUSAND/UL (ref 4.31–10.16)

## 2022-12-29 LAB
BILE AC SERPL-SCNC: 6.7 UMOL/L (ref 0–10)
RPR SER QL: NORMAL

## 2023-01-02 PROBLEM — Z3A.30 30 WEEKS GESTATION OF PREGNANCY: Status: ACTIVE | Noted: 2022-10-04

## 2023-01-03 ENCOUNTER — ROUTINE PRENATAL (OUTPATIENT)
Dept: OBGYN CLINIC | Facility: CLINIC | Age: 28
End: 2023-01-03

## 2023-01-03 VITALS
HEIGHT: 60 IN | WEIGHT: 121 LBS | RESPIRATION RATE: 18 BRPM | HEART RATE: 91 BPM | SYSTOLIC BLOOD PRESSURE: 114 MMHG | DIASTOLIC BLOOD PRESSURE: 76 MMHG | BODY MASS INDEX: 23.75 KG/M2

## 2023-01-03 DIAGNOSIS — O09.899 H/O PRETERM DELIVERY, CURRENTLY PREGNANT: Primary | ICD-10-CM

## 2023-01-03 DIAGNOSIS — K21.9 GASTROESOPHAGEAL REFLUX DISEASE WITHOUT ESOPHAGITIS: ICD-10-CM

## 2023-01-03 DIAGNOSIS — Z3A.30 30 WEEKS GESTATION OF PREGNANCY: ICD-10-CM

## 2023-01-03 DIAGNOSIS — Z71.85 VACCINE COUNSELING: ICD-10-CM

## 2023-01-03 RX ORDER — FAMOTIDINE 20 MG/1
20 TABLET, FILM COATED ORAL 2 TIMES DAILY
Qty: 90 TABLET | Refills: 3 | Status: SHIPPED | OUTPATIENT
Start: 2023-01-03

## 2023-01-03 NOTE — PROGRESS NOTES
This is a 24 y o   at 30w3d who presents for return OB visit  Denies contractions, leakage, bleeding  Endorses fetal movement   Continues to have vaginal soreness with use of progesterone  Also noticing worsening heartburn no longer able to be controlled with just tums  BP: 114/76 TWlb    Encouraged pt to continue progesterone for prevention of PTB   Discussed pregnancy support belts, compression underwear, etc for symptomatic relief  Pepcid sent to pharmacy, take daily or BID  Reviewed 28 wk labs - normal  Has 34 wk US scheduled  F/up 2 wks

## 2023-01-04 ENCOUNTER — TELEPHONE (OUTPATIENT)
Facility: HOSPITAL | Age: 28
End: 2023-01-04

## 2023-01-04 NOTE — RESULT ENCOUNTER NOTE
Normal bile acids  I am unable to send her a message due to no MyChart  Can you please call her and advise normal  She is Sinhala speaking so may need translation  She had OB visit yesterday but don't see that this was addressed  I copied Dr Cherie Pinon who is seeing her next     Thanks,  [place]

## 2023-01-04 NOTE — TELEPHONE ENCOUNTER
Left [facility] for Stephanie Szymanski via [employer] Portuguese ID# [id number] April Pound with results of her normal Bile Acid results  Stephanie Szymanski instructed to call the nurse line with any questions

## 2023-01-04 NOTE — TELEPHONE ENCOUNTER
----- Message from STEPHEN Simmons sent at 1/4/2023  8:13 AM EST -----  Normal bile acids  I am unable to send her a message due to no MyChart  Can you please call her and advise normal  She is Faroese speaking so may need translation  She had OB visit yesterday but don't see that this was addressed  I copied Dr Joyce Glass who is seeing her next     Thanks,  [place]

## 2023-01-12 DIAGNOSIS — Z34.93 PRENATAL CARE IN THIRD TRIMESTER: Primary | ICD-10-CM

## 2023-01-12 RX ORDER — ASCORBIC ACID, CHOLECALCIFEROL, .ALPHA.-TOCOPHEROL ACETATE, DL-, PYRIDOXINE, FOLIC ACID, CYANOCOBALAMIN, CALCIUM, FERROUS FUMARATE, MAGNESIUM, DOCONEXENT 85; 200; 10; 25; 1; 12; 140; 27; 45; 300 [IU]/1; [IU]/1; [IU]/1; [IU]/1; MG/1; UG/1; MG/1; MG/1; MG/1; MG/1
1 CAPSULE, GELATIN COATED ORAL DAILY
Qty: 30 CAPSULE | Refills: 10 | Status: SHIPPED | OUTPATIENT
Start: 2023-01-12

## 2023-01-17 ENCOUNTER — ROUTINE PRENATAL (OUTPATIENT)
Dept: OBGYN CLINIC | Facility: CLINIC | Age: 28
End: 2023-01-17

## 2023-01-17 VITALS
DIASTOLIC BLOOD PRESSURE: 75 MMHG | BODY MASS INDEX: 24.03 KG/M2 | RESPIRATION RATE: 18 BRPM | SYSTOLIC BLOOD PRESSURE: 110 MMHG | HEART RATE: 73 BPM | WEIGHT: 122.4 LBS | HEIGHT: 60 IN

## 2023-01-17 DIAGNOSIS — Z3A.32 32 WEEKS GESTATION OF PREGNANCY: Primary | ICD-10-CM

## 2023-01-17 RX ORDER — PRENATAL WITH FERROUS FUM AND FOLIC ACID 3080; 920; 120; 400; 22; 1.84; 3; 20; 10; 1; 12; 200; 27; 25; 2 [IU]/1; [IU]/1; MG/1; [IU]/1; MG/1; MG/1; MG/1; MG/1; MG/1; MG/1; UG/1; MG/1; MG/1; MG/1; MG/1
1 TABLET ORAL DAILY
Qty: 30 TABLET | Refills: 4 | Status: SHIPPED | OUTPATIENT
Start: 2023-01-17

## 2023-01-17 NOTE — PROGRESS NOTES
[address] VISIT  Name: Nissa Israel  MRN: [id number]  : 1995  : #[id number]    ASSESSMENT/PLAN:  Problem List        Digestive    External hemorrhoids    Overview     Severe pain after previous delivery  Taking colace but still straining, encouraged addition of miralax and increasing water intake         GERD (gastroesophageal reflux disease)       Other    Herpes simplex infection    Overview     Will need Valtrex suppression at 36 weeks         Cystic fibrosis carrier    Overview     Patient reports partner tested in prior pregnancy and was not a carrier  Status post parathyroidectomy ([facility] [address] )    Overview     Unilateral on the right for an adenoma which was benign  Being worked up for genetic predisposition for cancer with  and results are pending the end of September  History of cholestasis during pregnancy    Overview     History of cholestasis in last pregnancy  Baseline bile acid level ordered at 13 weeks  Pt has not completed as of          H/O  delivery, currently pregnant    Overview     Delivery at 35 weeks after presenting in  labor, outlet VAVD, with ML episiotomy  Continuing to use vaginal progesterone         Rubella non-immune status, antepartum    Overview     Rubella equivocal  Will require MMR after delivery         Vitamin D deficiency    Overview     Started on vit d 2000 iu on 22 for level of 12  Recommend rescreening with another level in 6-8 weeks  32 weeks gestation of pregnancy    Overview     · Hx  delivery at 35 weeks  Follows closely with MFM, cotninue with vaginal progesterone and cervical lengths  · NIPS low risk  Pt did not complete AFP  Order cancelled   · 10/26: 20 week scan   HC noted <5%  · : growth scan improved, f/up growth scan scheduled for 36 weeks  · 28 week labs wnl  · Delivery consent signed 22  · TDAP given 22  · Continue with vaginal progesterone until 36 weeks  · RTO in 4 weeks  Current Assessment & Plan     No obstetric complaints  Patient desires depo for contraception  F/u in 2 weeks         Vaccine counseling       SUBJECTIVE 24 y o  Divina Situ here for PN visit  She denies contractions  She denies leakage of fluid and vaginal bleeding  She denies good fetal movement  Her pregnancy is complicated by h/o cholestasis, h/o HSV, CF carrier, rubella NI and h/o PTD  She is continuing to take her vaginal progesterone and prenatal vitamin  She would like depo for contraception  OBJECTIVE:  Vitals:    23 1635   BP: 110/75   Pulse: 73   Resp: 18       Physical Exam    Fundal height: 32cm  Fetal Heart Rate: 154     Pregnancy Plan:     Delivery Plans  Planned delivery method: Vaginal  Planned delivery location: AN L&D  Planned anesthesia: None  Acceptable blood products:  All     Post-Delivery Plans  Feeding intentions: Breast Milk and Non-human milk substitute      Future Appointments   Date Time Provider Horacio Quach   2023  3:45 PM MD Man[address] [address] [facility] [address]   2023  3:30 PM  [facility] [address], MD  OB/GYN PGY-2  2023  4:43 PM

## 2023-01-31 ENCOUNTER — ROUTINE PRENATAL (OUTPATIENT)
Dept: OBGYN CLINIC | Facility: CLINIC | Age: 28
End: 2023-01-31

## 2023-01-31 VITALS
WEIGHT: 126.2 LBS | SYSTOLIC BLOOD PRESSURE: 139 MMHG | HEIGHT: 60 IN | DIASTOLIC BLOOD PRESSURE: 79 MMHG | HEART RATE: 87 BPM | BODY MASS INDEX: 24.77 KG/M2

## 2023-01-31 DIAGNOSIS — Z3A.34 34 WEEKS GESTATION OF PREGNANCY: Primary | ICD-10-CM

## 2023-01-31 DIAGNOSIS — Z34.93 PRENATAL CARE IN THIRD TRIMESTER: ICD-10-CM

## 2023-01-31 NOTE — PROGRESS NOTES
[address] VISIT  Name: Zack Decker  MRN: [id number]  : 1995      ASSESSMENT/PLAN:  Problem List        Digestive    External hemorrhoids    Overview     Severe pain after previous delivery  Taking colace but still straining, encouraged addition of miralax and increasing water intake         GERD (gastroesophageal reflux disease)       Other    Herpes simplex infection    Overview     Will need Valtrex suppression at 36 weeks  Unclear history of HSV  On chart review, patient was treated for suspected HSV outbreak in 2016  Patient denies h/o outbreaks         Cystic fibrosis carrier    Overview     Patient reports partner tested in prior pregnancy and was not a carrier  Status post parathyroidectomy ([facility] [address] )    Overview     Unilateral on the right for an adenoma which was benign  Being worked up for genetic predisposition for cancer with  and results are pending the end of September  History of cholestasis during pregnancy    Overview     History of cholestasis in last pregnancy  Baseline bile acid level ordered at 13 weeks  Pt has not completed as of          H/O  delivery, currently pregnant    Overview     Delivery at 35 weeks after presenting in  labor, outlet VAVD, with ML episiotomy  Continuing to use vaginal progesterone         Current Assessment & Plan     Patient taking irregularly  Would like to stop  Reiterated recommendation  Rubella non-immune status, antepartum    Overview     Rubella equivocal  Will require MMR after delivery         Vitamin D deficiency    Overview     Started on vit d 2000 iu on 22 for level of 12  Recommend rescreening with another level in 6-8 weeks  34 weeks gestation of pregnancy    Overview     · Hx  delivery at 35 weeks  Follows closely with MFM, cotninue with vaginal progesterone and cervical lengths  · NIPS low risk  Pt did not complete AFP  Order cancelled 11/29  · 10/26: 20 week scan  HC noted <5%  · 12/19: growth scan improved, f/up growth scan scheduled for 36 weeks  · 28 week labs wnl  · Delivery consent signed 12/20/22  · TDAP given 12/20/22  · Continue with vaginal progesterone until 36 weeks  · RTO in 4 weeks  Current Assessment & Plan     No obstetric complaints  F/u in 2 weeks for GBS swab         Vaccine counseling       SUBJECTIVE 24 y o  [id number] 34w3d here for PN visit  She denies contractions  She denies leakage of fluid and vaginal bleeding  She endorses good fetal movement  Her pregnancy is complicated by rubella NI, unclear h/o HSV, h/o cholestasis, h/o PTD, CF carrier, and external hemorrhoids  She states that her son had strep throat, but she has no symptoms and has not been sharing drinks or utensils  She also does not want to continue taking her vaginal progesterone  OBJECTIVE:  Vitals:    01/31/23 1605   BP: 139/79   Pulse: 87     Physical Exam  Vitals reviewed  Constitutional:       Appearance: Normal appearance  HENT:      Head: Normocephalic and atraumatic  Eyes:      Extraocular Movements: Extraocular movements intact  Cardiovascular:      Rate and Rhythm: Normal rate and regular rhythm  Pulses: Normal pulses  Heart sounds: Normal heart sounds  No murmur heard  Pulmonary:      Effort: Pulmonary effort is normal  No respiratory distress  Breath sounds: Normal breath sounds  Abdominal:      Palpations: Abdomen is soft  Tenderness: There is no abdominal tenderness  Comments: Gravid uterus   Musculoskeletal:         General: Normal range of motion  Cervical back: Normal range of motion  Skin:     General: Skin is warm and dry  Neurological:      Mental Status: She is alert     Psychiatric:         Mood and Affect: Mood normal          Behavior: Behavior normal          Fundal height: 34cm  Fetal Heart Rate: 159     Pregnancy Plan:     Delivery Plans  Planned delivery method: Vaginal  Planned delivery location: AN L&D  Planned anesthesia: None  Acceptable blood products:  All     Post-Delivery Plans  Feeding intentions: Breast Milk and Non-human milk substitute      Future Appointments   Date Time Provider Horacio Quach   2023  3:30 PM   [address]   2023  4:00 PM Tammy Vargas DO [address] Paul Richter MD  OB/GYN PGY-2  2023  4:21 PM

## 2023-02-13 ENCOUNTER — ULTRASOUND (OUTPATIENT)
Dept: PERINATAL CARE | Facility: CLINIC | Age: 28
End: 2023-02-13

## 2023-02-13 VITALS
WEIGHT: 130.2 LBS | BODY MASS INDEX: 25.56 KG/M2 | HEART RATE: 88 BPM | HEIGHT: 60 IN | SYSTOLIC BLOOD PRESSURE: 112 MMHG | DIASTOLIC BLOOD PRESSURE: 66 MMHG

## 2023-02-13 DIAGNOSIS — O09.899 H/O PRETERM DELIVERY, CURRENTLY PREGNANT: ICD-10-CM

## 2023-02-13 DIAGNOSIS — Z36.89 ENCOUNTER FOR ULTRASOUND TO CHECK FETAL GROWTH: Primary | ICD-10-CM

## 2023-02-13 NOTE — PROGRESS NOTES
[address]: Ms Cristhian Metzger was seen today for fetal growth assessment ultrasound  See ultrasound report under "OB Procedures" tab  Please don't hesitate to contact our office with any concerns or questions    Dorian Connell MD

## 2023-02-13 NOTE — PATIENT INSTRUCTIONS
Thank you for choosing us for your  care today  If you have any questions about your ultrasound or care, please do not hesitate to contact us or your primary obstetrician  Some general instructions for your pregnancy are:    Exercise: Aim for 22 minutes per day (150 minutes per week) of regular exercise  Walking is great! Nutrition: Choose healthy sources of calcium, iron, and protein  Learn about Preeclampsia: preeclampsia is a common, serious high blood pressure complication in pregnancy  A blood pressure of 163NSJB (systolic or top number) or 45VQPE (diastolic or bottom number) is not normal and needs evaluation by your doctor  Aspirin is sometimes prescribed in early pregnancy to prevent preeclampsia in women with risk factors - ask your obstetrician if you should be on this medication  For more resources, visit:  [employer] fi  If you smoke, try to reduce how many cigarettes you smoke or try to quit completely  Do not vape  Other warning signs to watch out for in pregnancy or postpartum: chest pain, obstructed breathing or shortness of breath, seizures, thoughts of hurting yourself or your baby, bleeding, a painful or swollen leg, fever, or headache (see [employer] POST-BIRTH Warning Signs campaign)  If these happen call 911  Itching is also not normal in pregnancy and if you experience this, especially over your hands and feet, potentially worse at night, notify your doctors

## 2023-02-14 ENCOUNTER — ROUTINE PRENATAL (OUTPATIENT)
Dept: OBGYN CLINIC | Facility: CLINIC | Age: 28
End: 2023-02-14

## 2023-02-14 VITALS
SYSTOLIC BLOOD PRESSURE: 104 MMHG | HEIGHT: 60 IN | HEART RATE: 82 BPM | BODY MASS INDEX: 25.52 KG/M2 | DIASTOLIC BLOOD PRESSURE: 69 MMHG | WEIGHT: 130 LBS

## 2023-02-14 DIAGNOSIS — Z86.19 HX OF HERPES SIMPLEX INFECTION: Primary | ICD-10-CM

## 2023-02-14 DIAGNOSIS — Z3A.36 36 WEEKS GESTATION OF PREGNANCY: ICD-10-CM

## 2023-02-14 RX ORDER — VALACYCLOVIR HYDROCHLORIDE 500 MG/1
500 TABLET, FILM COATED ORAL 2 TIMES DAILY
Qty: 60 TABLET | Refills: 0 | Status: ON HOLD | OUTPATIENT
Start: 2023-02-14 | End: 2023-03-16

## 2023-02-14 NOTE — PROGRESS NOTES
[address]  [id number]  1995    ASSESSMENT/PLAN:  Problem List        Digestive    External hemorrhoids    Overview     Severe pain after previous delivery  Taking colace but still straining, encouraged addition of miralax and increasing water intake         GERD (gastroesophageal reflux disease)       Other    Herpes simplex infection    Overview     Will need Valtrex suppression at 36 weeks  Unclear history of HSV  On chart review, patient was treated for suspected HSV outbreak in 2016  Patient denies h/o outbreaks         Cystic fibrosis carrier    Overview     Patient reports partner tested in prior pregnancy and was not a carrier  Status post parathyroidectomy ([facility] [address] )    Overview     Unilateral on the right for an adenoma which was benign  Being worked up for genetic predisposition for cancer with  and results are pending the end of September  History of cholestasis during pregnancy    Overview     History of cholestasis in last pregnancy  Baseline bile acid level ordered at 13 weeks  Pt has not completed as of          H/O  delivery, currently pregnant    Overview     Delivery at 35 weeks after presenting in  labor, outlet VAVD, with ML episiotomy  Continuing to use vaginal progesterone         Rubella non-immune status, antepartum    Overview     Rubella equivocal  Will require MMR after delivery         Vitamin D deficiency    Overview     Started on vit d 2000 iu on 22 for level of 12  Recommend rescreening with another level in 6-8 weeks  36 weeks gestation of pregnancy    Overview     · Hx  delivery at 35 weeks  Follows closely with MFM, cotninue with vaginal progesterone and cervical lengths  · NIPS low risk  Pt did not complete AFP  Order cancelled   · 10/26: 20 week scan   HC noted <5%  · : growth scan improved, f/up growth scan scheduled for 36 weeks  · 28 week labs wnl  · Delivery consent signed 12/20/22  · TDAP given 12/20/22  · Continue with vaginal progesterone until 36 weeks  · RTO in 1 weeks  Vaccine counseling   Other Visit Diagnoses     Hx of herpes simplex infection    -  Primary         assistance: [id number]  Subjective: 24 y o  [id number] 36w3d here for prenatal visit  She denies contractions  She denies leakage of fluid and vaginal bleeding  She endorses good fetal movement  GBS swab collected at today's visit  Pt denies PCN allergy  Discussed potential use of PCN in labor if result is positive  Discussed valtrex suppression  Pt unaware of prior HSV hx  On chart review, patient was treated for suspected HSV outbreak in 2016  Pt agreeable to starting valtrex suppression until delivery  Objective:    Vitals:    02/14/23 1615   BP: 104/69   Pulse: 82      FHT: 146 bpm  Fundal Height: 36 cm    Pregnancy Plan:     Delivery Plans  Planned delivery method: Vaginal  Planned delivery location: AN L&D  Planned anesthesia: None  Acceptable blood products: All     Post-Delivery Plans  Feeding intentions: Breast Milk and Non-human milk substitute    Chaperone present for exam    General: Well appearing, no distress  Respiratory: Unlabored breathing  Cardiovascular: Regular rate  Abdomen: Soft, gravid, nontender  Fundal Height: Appropriate for gestational age  Extremities: Warm and well perfused  Non tender    SVE: 1/0/-4    D/w Dr Elizabeth Hay, DO     36 weeks: collect GBS/PCN allergy, flu vaccine, SVE

## 2023-02-16 DIAGNOSIS — K21.9 GASTROESOPHAGEAL REFLUX DISEASE WITHOUT ESOPHAGITIS: ICD-10-CM

## 2023-02-16 LAB — GP B STREP DNA SPEC QL NAA+PROBE: NEGATIVE

## 2023-02-16 RX ORDER — FAMOTIDINE 20 MG/1
TABLET, FILM COATED ORAL
Qty: 180 TABLET | Refills: 2 | Status: ON HOLD | OUTPATIENT
Start: 2023-02-16

## 2023-02-22 ENCOUNTER — ROUTINE PRENATAL (OUTPATIENT)
Dept: OBGYN CLINIC | Facility: CLINIC | Age: 28
End: 2023-02-22

## 2023-02-22 VITALS
WEIGHT: 132.2 LBS | SYSTOLIC BLOOD PRESSURE: 112 MMHG | HEART RATE: 85 BPM | BODY MASS INDEX: 25.95 KG/M2 | HEIGHT: 60 IN | DIASTOLIC BLOOD PRESSURE: 73 MMHG

## 2023-02-22 DIAGNOSIS — Z34.93 PRENATAL CARE IN THIRD TRIMESTER: Primary | ICD-10-CM

## 2023-02-22 NOTE — PROGRESS NOTES
Courtney Guzmán presents today for routine OB visit at 37w4d  She is a  with a history of one  delivery at 27 weeks  Blood Pressure: 112/73  Wt=60 kg (132 lb 3 2 oz); Body mass index is 25 82 kg/m² ; TWG=13 2 kg (29 lb 3 2 oz)  Fetal Heart Rate: 140; Fundal Height (cm): 37 cm  Abdomen: gravid, soft, non-tender  She reports no complaints or concerns  Denies uterine contractions  Denies vaginal bleeding or leaking of fluid  Reports adequate fetal movement of at least 10 movements in 2 hours once daily  Reviewed premature labor precautions and fetal kick counts  Advised to continue medications and return in 1 weeks  Current Outpatient Medications   Medication Instructions   • famotidine (PEPCID) 20 mg tablet TAKE 1 TABLET BY MOUTH TWICE A DAY   • Prenat w/o B-HP-Ejcqpea-FA-DHA (PNV-DHA) 27-0 6-0 4-300 MG CAPS 1 tablet, Oral, Daily   • Prenatal 27-1 MG TABS 1 tablet, Oral, Daily   • valACYclovir (VALTREX) 500 mg, Oral, 2 times daily     Laboratory workup: initial OB labs (complete); 28 week labs (complete, GTT WNL); 36 week cultures (GBS negative)    Genetic Screening: NIPS low risk    Vaccinations: influenza (received 22); Tdap (received 22)    Fetal Ultrasounds:  23 36w2d: Vertex  Anterior placenta  EFW 30%    TANIA 3/13/2023 Problems (from 22 to present)     Problem Noted Resolved    GERD (gastroesophageal reflux disease) 2022 by Amando He MD No    Vaccine counseling 2022 by Aparna Mcgraw MD No    36 weeks gestation of pregnancy 10/4/2022 by Kristofer Canales DO No    Overview Addendum 2023  8:58 AM by Kristofer Canales DO     · Hx  delivery at 35 weeks  Follows closely with MFM, cotninue with vaginal progesterone and cervical lengths  · NIPS low risk  Pt did not complete AFP  Order cancelled   · 10/26: 20 week scan   HC noted <5%  · : growth scan improved, f/up growth scan scheduled for 36 weeks  · 28 week labs wnl  · Delivery consent signed 22  · TDAP given 22  · Continue with vaginal progesterone until 36 weeks  · RTO in 1 weeks  Rubella non-immune status, antepartum 2022 by Maddie Canas MD No    Overview Signed 2022 11:25 AM by Maddie Canas MD     Rubella equivocal  Will require MMR after delivery         History of cholestasis during pregnancy 2022 by Maddie Canas MD No    Overview Addendum 2022  4:17 PM by Benita Black MD     History of cholestasis in last pregnancy  Baseline bile acid level ordered at 13 weeks  Pt has not completed as of          H/O  delivery, currently pregnant 2022 by Maddie Canas MD No    Overview Addendum 2022  9:31 AM by Benita Black MD     Delivery at 35 weeks after presenting in  labor, outlet VAVD, with ML episiotomy  Continuing to use vaginal progesterone         Cystic fibrosis carrier 2021 by Jackeline Roche PA-C No    Overview Signed 2022 10:13 AM by Jose Madden MD     Patient reports partner tested in prior pregnancy and was not a carrier  Herpes simplex infection 2016 by Jackeline Roche PA-C No    Overview Addendum 2023  5:04 PM by Lindsey Muñoz MD     Will need Valtrex suppression at 36 weeks  Unclear history of HSV  On chart review, patient was treated for suspected HSV outbreak in    Patient denies h/o outbreaks

## 2023-02-26 ENCOUNTER — HOSPITAL ENCOUNTER (INPATIENT)
Facility: HOSPITAL | Age: 28
LOS: 2 days | Discharge: HOME | End: 2023-03-01
Attending: OBSTETRICS & GYNECOLOGY | Admitting: OBSTETRICS & GYNECOLOGY

## 2023-02-26 ENCOUNTER — HOSPITAL ENCOUNTER (EMERGENCY)
Facility: HOSPITAL | Age: 28
Discharge: STILL ADMITTED OR ADMINISTRATIVE | End: 2023-02-27

## 2023-02-26 VITALS
HEART RATE: 85 BPM | DIASTOLIC BLOOD PRESSURE: 82 MMHG | TEMPERATURE: 98.3 F | SYSTOLIC BLOOD PRESSURE: 118 MMHG | RESPIRATION RATE: 18 BRPM | OXYGEN SATURATION: 99 %

## 2023-02-26 DIAGNOSIS — Z28.39 RUBELLA NON-IMMUNE STATUS, ANTEPARTUM: ICD-10-CM

## 2023-02-26 DIAGNOSIS — O09.899 RUBELLA NON-IMMUNE STATUS, ANTEPARTUM: ICD-10-CM

## 2023-02-26 DIAGNOSIS — Z3A.36 36 WEEKS GESTATION OF PREGNANCY: Primary | ICD-10-CM

## 2023-02-27 ENCOUNTER — ANESTHESIA (OUTPATIENT)
Dept: ANESTHESIOLOGY | Facility: HOSPITAL | Age: 28
End: 2023-02-27

## 2023-02-27 ENCOUNTER — ANESTHESIA EVENT (OUTPATIENT)
Dept: ANESTHESIOLOGY | Facility: HOSPITAL | Age: 28
End: 2023-02-27

## 2023-02-27 PROBLEM — O47.9 UTERINE CONTRACTIONS: Status: ACTIVE | Noted: 2023-02-27

## 2023-02-27 PROBLEM — Z3A.38 38 WEEKS GESTATION OF PREGNANCY: Status: ACTIVE | Noted: 2022-10-04

## 2023-02-27 LAB
ABO GROUP BLD: NORMAL
ALBUMIN SERPL BCP-MCNC: 3.5 G/DL (ref 3.5–5)
ALP SERPL-CCNC: 149 U/L (ref 34–104)
ALT SERPL W P-5'-P-CCNC: 9 U/L (ref 7–52)
ANION GAP SERPL CALCULATED.3IONS-SCNC: 11 MMOL/L (ref 4–13)
AST SERPL W P-5'-P-CCNC: 17 U/L (ref 13–39)
BASE EXCESS BLDCOA CALC-SCNC: -4.3 MMOL/L (ref 3–11)
BASE EXCESS BLDCOV CALC-SCNC: -3.5 MMOL/L (ref 1–9)
BILIRUB SERPL-MCNC: 0.6 MG/DL (ref 0.2–1)
BLD GP AB SCN SERPL QL: NEGATIVE
BUN SERPL-MCNC: 13 MG/DL (ref 5–25)
CALCIUM SERPL-MCNC: 9.5 MG/DL (ref 8.4–10.2)
CHLORIDE SERPL-SCNC: 106 MMOL/L (ref 96–108)
CO2 SERPL-SCNC: 19 MMOL/L (ref 21–32)
CREAT SERPL-MCNC: 0.59 MG/DL (ref 0.6–1.3)
ERYTHROCYTE [DISTWIDTH] IN BLOOD BY AUTOMATED COUNT: 12.1 % (ref 11.6–15.1)
GFR SERPL CREATININE-BSD FRML MDRD: 127 ML/MIN/1.73SQ M
GLUCOSE SERPL-MCNC: 85 MG/DL (ref 65–140)
HCO3 BLDCOA-SCNC: 22.4 MMOL/L (ref 17.3–27.3)
HCO3 BLDCOV-SCNC: 21 MMOL/L (ref 12.2–28.6)
HCT VFR BLD AUTO: 37.9 % (ref 34.8–46.1)
HGB BLD-MCNC: 13.3 G/DL (ref 11.5–15.4)
HOLD SPECIMEN: NORMAL
MCH RBC QN AUTO: 32.8 PG (ref 26.8–34.3)
MCHC RBC AUTO-ENTMCNC: 35.1 G/DL (ref 31.4–37.4)
MCV RBC AUTO: 93 FL (ref 82–98)
O2 CT VFR BLDCOA CALC: 9.3 ML/DL
OXYHGB MFR BLDCOA: 46.6 %
OXYHGB MFR BLDCOV: 72.5 %
PCO2 BLDCOA: 47.3 MM[HG] (ref 30–60)
PCO2 BLDCOV: 36.4 MM HG (ref 27–43)
PH BLDCOA: 7.29 [PH] (ref 7.23–7.43)
PH BLDCOV: 7.38 [PH] (ref 7.19–7.49)
PLATELET # BLD AUTO: 242 THOUSANDS/UL (ref 149–390)
PMV BLD AUTO: 9.3 FL (ref 8.9–12.7)
PO2 BLDCOA: 22.8 MM HG (ref 5–25)
PO2 BLDCOV: 31.1 MM HG (ref 15–45)
POTASSIUM SERPL-SCNC: 3.5 MMOL/L (ref 3.5–5.3)
PROT SERPL-MCNC: 6.8 G/DL (ref 6.4–8.4)
RBC # BLD AUTO: 4.06 MILLION/UL (ref 3.81–5.12)
RH BLD: POSITIVE
SAO2 % BLDCOV: 13.3 ML/DL
SODIUM SERPL-SCNC: 136 MMOL/L (ref 135–147)
SPECIMEN EXPIRATION DATE: NORMAL
TREPONEMA PALLIDUM IGG+IGM AB [PRESENCE] IN SERUM OR PLASMA BY IMMUNOASSAY: NORMAL
WBC # BLD AUTO: 10.05 THOUSAND/UL (ref 4.31–10.16)

## 2023-02-27 PROCEDURE — 10907ZC DRAINAGE OF AMNIOTIC FLUID, THERAPEUTIC FROM PRODUCTS OF CONCEPTION, VIA NATURAL OR ARTIFICIAL OPENING: ICD-10-PCS | Performed by: OBSTETRICS & GYNECOLOGY

## 2023-02-27 PROCEDURE — 4A1HXCZ MONITORING OF PRODUCTS OF CONCEPTION, CARDIAC RATE, EXTERNAL APPROACH: ICD-10-PCS | Performed by: OBSTETRICS & GYNECOLOGY

## 2023-02-27 RX ORDER — METHYLERGONOVINE MALEATE 0.2 MG/ML
0.2 INJECTION INTRAVENOUS ONCE
Status: COMPLETED | OUTPATIENT
Start: 2023-02-27 | End: 2023-02-27

## 2023-02-27 RX ORDER — IBUPROFEN 600 MG/1
600 TABLET ORAL EVERY 6 HOURS PRN
Status: DISCONTINUED | OUTPATIENT
Start: 2023-02-27 | End: 2023-03-01 | Stop reason: HOSPADM

## 2023-02-27 RX ORDER — OXYCODONE HYDROCHLORIDE 5 MG/1
5 TABLET ORAL ONCE
Status: COMPLETED | OUTPATIENT
Start: 2023-02-27 | End: 2023-02-27

## 2023-02-27 RX ORDER — FAMOTIDINE 20 MG/1
20 TABLET, FILM COATED ORAL 2 TIMES DAILY
Status: DISCONTINUED | OUTPATIENT
Start: 2023-02-27 | End: 2023-02-27

## 2023-02-27 RX ORDER — CARBOPROST TROMETHAMINE 250 UG/ML
INJECTION, SOLUTION INTRAMUSCULAR
Status: DISPENSED
Start: 2023-02-27 | End: 2023-02-27

## 2023-02-27 RX ORDER — OXYTOCIN/RINGER'S LACTATE 30/500 ML
PLASTIC BAG, INJECTION (ML) INTRAVENOUS
Status: COMPLETED
Start: 2023-02-27 | End: 2023-02-27

## 2023-02-27 RX ORDER — MISOPROSTOL 200 UG/1
1000 TABLET ORAL ONCE
Status: COMPLETED | OUTPATIENT
Start: 2023-02-27 | End: 2023-02-27

## 2023-02-27 RX ORDER — ACETAMINOPHEN 325 MG/1
650 TABLET ORAL EVERY 4 HOURS PRN
Status: DISCONTINUED | OUTPATIENT
Start: 2023-02-27 | End: 2023-03-01 | Stop reason: HOSPADM

## 2023-02-27 RX ORDER — DOCUSATE SODIUM 100 MG/1
100 CAPSULE, LIQUID FILLED ORAL 2 TIMES DAILY
Status: DISCONTINUED | OUTPATIENT
Start: 2023-02-27 | End: 2023-03-01 | Stop reason: HOSPADM

## 2023-02-27 RX ORDER — ACETAMINOPHEN 325 MG/1
650 TABLET ORAL EVERY 6 HOURS PRN
Status: DISCONTINUED | OUTPATIENT
Start: 2023-02-27 | End: 2023-02-27

## 2023-02-27 RX ORDER — CALCIUM CARBONATE 200(500)MG
1000 TABLET,CHEWABLE ORAL 3 TIMES DAILY PRN
Status: DISCONTINUED | OUTPATIENT
Start: 2023-02-27 | End: 2023-03-01 | Stop reason: HOSPADM

## 2023-02-27 RX ORDER — ONDANSETRON 2 MG/ML
4 INJECTION INTRAMUSCULAR; INTRAVENOUS EVERY 6 HOURS PRN
Status: DISCONTINUED | OUTPATIENT
Start: 2023-02-27 | End: 2023-02-27

## 2023-02-27 RX ORDER — ONDANSETRON 2 MG/ML
4 INJECTION INTRAMUSCULAR; INTRAVENOUS EVERY 8 HOURS PRN
Status: DISCONTINUED | OUTPATIENT
Start: 2023-02-27 | End: 2023-03-01 | Stop reason: HOSPADM

## 2023-02-27 RX ORDER — CALCIUM CARBONATE 200(500)MG
500 TABLET,CHEWABLE ORAL DAILY PRN
Status: DISCONTINUED | OUTPATIENT
Start: 2023-02-27 | End: 2023-02-27

## 2023-02-27 RX ORDER — OXYTOCIN/RINGER'S LACTATE 30/500 ML
62.5 PLASTIC BAG, INJECTION (ML) INTRAVENOUS ONCE
Status: COMPLETED | OUTPATIENT
Start: 2023-02-27 | End: 2023-02-27

## 2023-02-27 RX ORDER — VALACYCLOVIR HYDROCHLORIDE 500 MG/1
500 TABLET, FILM COATED ORAL 2 TIMES DAILY
Status: DISCONTINUED | OUTPATIENT
Start: 2023-02-27 | End: 2023-02-27

## 2023-02-27 RX ORDER — SODIUM CHLORIDE, SODIUM LACTATE, POTASSIUM CHLORIDE, CALCIUM CHLORIDE 600; 310; 30; 20 MG/100ML; MG/100ML; MG/100ML; MG/100ML
125 INJECTION, SOLUTION INTRAVENOUS CONTINUOUS
Status: DISCONTINUED | OUTPATIENT
Start: 2023-02-27 | End: 2023-02-27

## 2023-02-27 RX ORDER — DIAPER,BRIEF,INFANT-TODD,DISP
1 EACH MISCELLANEOUS DAILY PRN
Status: DISCONTINUED | OUTPATIENT
Start: 2023-02-27 | End: 2023-03-01 | Stop reason: HOSPADM

## 2023-02-27 RX ORDER — METHYLERGONOVINE MALEATE 0.2 MG/ML
INJECTION INTRAVENOUS
Status: COMPLETED
Start: 2023-02-27 | End: 2023-02-27

## 2023-02-27 RX ORDER — OXYTOCIN/RINGER'S LACTATE 30/500 ML
250 PLASTIC BAG, INJECTION (ML) INTRAVENOUS ONCE
Status: COMPLETED | OUTPATIENT
Start: 2023-02-27 | End: 2023-02-27

## 2023-02-27 RX ORDER — BUPIVACAINE HYDROCHLORIDE 2.5 MG/ML
30 INJECTION, SOLUTION EPIDURAL; INFILTRATION; INTRACAUDAL ONCE AS NEEDED
Status: DISCONTINUED | OUTPATIENT
Start: 2023-02-27 | End: 2023-02-27

## 2023-02-27 RX ORDER — DIPHENHYDRAMINE HYDROCHLORIDE 50 MG/ML
25 INJECTION INTRAMUSCULAR; INTRAVENOUS EVERY 6 HOURS PRN
Status: DISCONTINUED | OUTPATIENT
Start: 2023-02-27 | End: 2023-03-01 | Stop reason: HOSPADM

## 2023-02-27 RX ADMIN — ACETAMINOPHEN 650 MG: 325 TABLET ORAL at 15:48

## 2023-02-27 RX ADMIN — Medication 250 MILLI-UNITS/MIN: at 01:11

## 2023-02-27 RX ADMIN — Medication 62.5 MILLI-UNITS/MIN: at 02:18

## 2023-02-27 RX ADMIN — DOCUSATE SODIUM 100 MG: 100 CAPSULE, LIQUID FILLED ORAL at 09:16

## 2023-02-27 RX ADMIN — MISOPROSTOL 1000 MCG: 200 TABLET ORAL at 01:57

## 2023-02-27 RX ADMIN — HYDROCORTISONE 1 APPLICATION.: 1 CREAM TOPICAL at 01:35

## 2023-02-27 RX ADMIN — METHYLERGONOVINE MALEATE 0.2 MG: 0.2 INJECTION INTRAVENOUS at 01:59

## 2023-02-27 RX ADMIN — DOCUSATE SODIUM 100 MG: 100 CAPSULE, LIQUID FILLED ORAL at 17:51

## 2023-02-27 RX ADMIN — IBUPROFEN 600 MG: 600 TABLET ORAL at 01:35

## 2023-02-27 RX ADMIN — SODIUM CHLORIDE, SODIUM LACTATE, POTASSIUM CHLORIDE, AND CALCIUM CHLORIDE 925 ML/HR: .6; .31; .03; .02 INJECTION, SOLUTION INTRAVENOUS at 00:49

## 2023-02-27 RX ADMIN — BENZOCAINE AND LEVOMENTHOL 1 APPLICATION.: 200; 5 SPRAY TOPICAL at 01:35

## 2023-02-27 RX ADMIN — OXYCODONE HYDROCHLORIDE 5 MG: 5 TABLET ORAL at 02:14

## 2023-02-27 RX ADMIN — DIPHENHYDRAMINE HYDROCHLORIDE 25 MG: 50 INJECTION, SOLUTION INTRAMUSCULAR; INTRAVENOUS at 03:15

## 2023-02-27 RX ADMIN — TRANEXAMIC ACID 1000 MG: 1 INJECTION, SOLUTION INTRAVENOUS at 02:28

## 2023-02-27 NOTE — QUICK NOTE
Patient evaluated at bedside after care team was informed that patient had a " gush of bleeding"   Bimanual exam was performed  Fundus and lower uterine segment were noted to be firm  A small clot was removed from the lower uterine segment  A bernard catheter was placed and 700cc of urine emptied from the bladder  TXA was also given to ensure hemostasis  Bernard catheter was removed as the patient doesn't not have an epidural and can ambulate and void       Vitals:    02/27/23 0200   BP: 134/92   Pulse: 96   Resp:    Temp:          Plan and evaluation performed with Dr Anders Krabbe, MD   OBGYN PGY-1

## 2023-02-27 NOTE — L&D DELIVERY NOTE
OBGYN Vaginal Delivery Summary  Shannon Schneider 24 y o  female MRN: [id number]  Unit/Bed#: LD  Encounter: [id number]    Predelivery Diagnosis:  1  Pregnancy at 38w2d gestational age    3  Herpes Simplex infection  3  Rubella Non-immune  4  Carrier of Cystic Fibrosis    Postdelivery Diagnosis:  1  Same as above  2  Delivery of full term     Procedure: spontaneous vaginal delivery    Attending: Dr Debi Tabares     Assistant: Dr Jose Alberto Lyle    Anesthesia: none    QBL: 284 mL  Admission H 3 g/dL  Admission platelets: 687 thousands/uL    Complications: none apparent    Specimens: cord blood, arterial and venous cord blood gases, placenta to storage     Findings:   1  Viable female at [address] [facility], with APGARS of 9 and 9 at 1 and 5 minutes respectively  Weight pending at time of dictation  2  Spontaneous delivery of intact placenta at 0113  Central  insertion three vessel umbilical cord  3  Blood gases:  Umbilical Cord Venous Blood Gas:  Results from last 7 days   Lab Units 23  0116   PH COV  7 380   PCO2 COV mm HG 36 4   HCO3 COV mmol/L 21 0   BASE EXC COV mmol/L -3 5*   O2 CT CD VB mL/dL 13 3   O2 HGB, VENOUS CORD % 06 8     Umbilical Cord Arterial Blood Gas:  Results from last 7 days   Lab Units 23  0116   PH COA  7 293   PCO2 COA  47 3   PO2 COA mm HG 22 8   HCO3 COA mmol/L 22 4   BASE EXC COA mmol/L -4 3*   O2 CONTENT CORD ART ml/dl 9 3   O2 HGB, ARTERIAL CORD % 46 6       Disposition:  Patient tolerated the procedure well and was recovering in labor and delivery room  Brief history and labor course:  Juliet Mensah is a 24 y o  [id number] at 36wk2d  She presented to labor and delivery in labor  At presentation SVE was 5/90/-1  SROM was noted shortly after arrival   She quickly progressed to complete cervical dilation and began pushing       Description of procedure  After pushing for 5 minutes , the patient delivered a viable female  at [address] [facility] on 2023, weight pending at time of dictation, apgars of 9 (1 min) and 9 (5 min)  The fetal vertex delivered spontaneously  Baby restituted  to ADRIAN   There was  no nuchal cord  The anterior (right) shoulder delivered atraumatically with maternal expulsive forces and the assistance of gentle downward traction  The posterior shoulder delivered with maternal expulsive forces and the assistance of gentle upward traction  The remainder of the fetus delivered spontaneously  Upon delivery the infant was placed on the mother's abdomen and delayed cord clamping was performed  The umbilical cord was then doubly clamped and cut  The infant was noted to cry spontaneously and was moving all extremities appropriately  There was no evidence for injury  Awaiting nurse resuscitators evaluated the   Arterial and venous cord blood gases and cord blood were collected for analysis and were promptly sent to the lab  In the immediate post-partum, IV pitocin was administered, and the uterus was noted to contract down well with massage and pitocin  The placenta delivered spontaneously at 0113 and was noted to be intact and had a centrally  inserted 3 vessel cord  The placenta was sent to storage  The vagina, cervix, perineum, and rectum were inspected  No lacerations were noted  At the conclusion of the procedure, all needle, sponge, and instrument counts were noted to be correct  Patient tolerated the procedure well and was allowed to recover in labor and delivery room with family and  before being transferred to the post-partum floor  Dr Marla Adan  was present and participated in all key portions of the case      Emeka Lunsford MD  2023  3:16 AM

## 2023-02-27 NOTE — PROGRESS NOTES
Called by RN to assess bleeding postpartum   Total QBL from delivery 159    At the bedside RN reports large clots with fundal massage   On my exam there were clots expressed with massage   On bimanual exam, large clot in the ANDRZEJ expressed   Pitocin was opened wide  Rectal cytotec 1000mcg and methergine x1 IM given   Bedside TAUS- no retained POCs noted   Plan for 8h bag of pitocin     Vitals:    02/27/23 0130   BP: 121/78   Pulse: 81   Resp: 18   Temp:          Will continue to monitor bleeding closely     Dr Beatriz Navarro is aware     Kevin Pittman MD  OBGYN PGY-3  2:08 AM  2/27/2023

## 2023-02-27 NOTE — PLAN OF CARE
Problem: POSTPARTUM  Goal: Experiences normal postpartum course  Description: INTERVENTIONS:  - Monitor maternal vital signs  - Assess uterine involution and lochia  Outcome: Progressing  Goal: Appropriate maternal -  bonding  Description: INTERVENTIONS:  - Identify family support  - Assess for appropriate maternal/infant bonding   -Encourage maternal/infant bonding opportunities  - Referral to  or  as needed  Outcome: Progressing  Goal: Establishment of infant feeding pattern  Description: INTERVENTIONS:  - Assess breast/bottle feeding  - Refer to lactation as needed  Outcome: Progressing  Goal: Incision(s), wounds(s) or drain site(s) healing without S/S of infection  Description: INTERVENTIONS  - Assess and document dressing, incision, wound bed, drain sites and surrounding tissue  - Provide patient and family education  - Perform skin care/dressing changes every   Outcome: Progressing     Problem: PAIN - ADULT  Goal: Verbalizes/displays adequate comfort level or baseline comfort level  Description: Interventions:  - Encourage patient to monitor pain and request assistance  - Assess pain using appropriate pain scale  - Administer analgesics based on type and severity of pain and evaluate response  - Implement non-pharmacological measures as appropriate and evaluate response  - Consider cultural and social influences on pain and pain management  - Notify physician/advanced practitioner if interventions unsuccessful or patient reports new pain  Outcome: Progressing     Problem: INFECTION - ADULT  Goal: Absence or prevention of progression during hospitalization  Description: INTERVENTIONS:  - Assess and monitor for signs and symptoms of infection  - Monitor lab/diagnostic results  - Monitor all insertion sites, i e  indwelling lines, tubes, and drains  - Monitor endotracheal if appropriate and nasal secretions for changes in amount and color  - [place] appropriate cooling/warming therapies per order  - Administer medications as ordered  - Instruct and encourage patient and family to use good hand hygiene technique  - Identify and instruct in appropriate isolation precautions for identified infection/condition  Outcome: Progressing  Goal: Absence of fever/infection during neutropenic period  Description: INTERVENTIONS:  - Monitor WBC    Outcome: Progressing     Problem: SAFETY ADULT  Goal: Patient will remain free of falls  Description: INTERVENTIONS:  - Educate patient/family on patient safety including physical limitations  - Instruct patient to call for assistance with activity   - Consult OT/PT to assist with strengthening/mobility   - Keep Call bell within reach  - Keep bed low and locked with side rails adjusted as appropriate  - Keep care items and personal belongings within reach  - Initiate and maintain comfort rounds  - Make Fall Risk Sign visible to staff  - Offer Toileting every  Hours, in advance of need  - Initiate/Maintain alarm  - Obtain necessary fall risk management equipment:   - Apply yellow socks and bracelet for high fall risk patients  - Consider moving patient to room near nurses station  Outcome: Progressing  Goal: Maintain or return to baseline ADL function  Description: INTERVENTIONS:  -  Assess patient's ability to carry out ADLs; assess patient's baseline for ADL function and identify physical deficits which impact ability to perform ADLs (bathing, care of mouth/teeth, toileting, grooming, dressing, etc )  - Assess/evaluate cause of self-care deficits   - Assess range of motion  - Assess patient's mobility; develop plan if impaired  - Assess patient's need for assistive devices and provide as appropriate  - Encourage maximum independence but intervene and supervise when necessary  - Involve family in performance of ADLs  - Assess for home care needs following discharge   - Consider OT consult to assist with ADL evaluation and planning for discharge  - Provide patient education as appropriate  Outcome: Progressing  Goal: Maintains/Returns to pre admission functional level  Description: INTERVENTIONS:  - Perform BMAT or MOVE assessment daily    - Set and communicate daily mobility goal to care team and patient/family/caregiver  - Collaborate with rehabilitation services on mobility goals if consulted  - Perform Range of Motion times a day  - Reposition patient every hours  - Dangle patient  times a day  - Stand patient  times a day  - Ambulate patient times a day  - Out of bed to chair  times a day   - Out of bed for meals  times a day  - Out of bed for toileting  - Record patient progress and toleration of activity level   Outcome: Progressing     Problem: Knowledge Deficit  Goal: Patient/family/caregiver demonstrates understanding of disease process, treatment plan, medications, and discharge instructions  Description: Complete learning assessment and assess knowledge base    Interventions:  - Provide teaching at level of understanding  - Provide teaching via preferred learning methods  Outcome: Progressing     Problem: DISCHARGE PLANNING  Goal: Discharge to home or other facility with appropriate resources  Description: INTERVENTIONS:  - Identify barriers to discharge w/patient and caregiver  - Arrange for needed discharge resources and transportation as appropriate  - Identify discharge learning needs (meds, wound care, etc )  - Arrange for interpretive services to assist at discharge as needed  - Refer to Case Management Department for coordinating discharge planning if the patient needs post-hospital services based on physician/advanced practitioner order or complex needs related to functional status, cognitive ability, or social support system  Outcome: Progressing

## 2023-02-27 NOTE — PLAN OF CARE
Problem: POSTPARTUM  Goal: Experiences normal postpartum course  Description: INTERVENTIONS:  - Monitor maternal vital signs  - Assess uterine involution and lochia  Outcome: Progressing  Goal: Appropriate maternal -  bonding  Description: INTERVENTIONS:  - Identify family support  - Assess for appropriate maternal/infant bonding   -Encourage maternal/infant bonding opportunities  - Referral to  or  as needed  Outcome: Progressing  Goal: Establishment of infant feeding pattern  Description: INTERVENTIONS:  - Assess breast/bottle feeding  - Refer to lactation as needed  Outcome: Progressing  Goal: Incision(s), wounds(s) or drain site(s) healing without S/S of infection  Description: INTERVENTIONS  - Assess and document dressing, incision, wound bed, drain sites and surrounding tissue  - Provide patient and family education  - Perform skin care/dressing changes every   Outcome: Progressing     Problem: PAIN - ADULT  Goal: Verbalizes/displays adequate comfort level or baseline comfort level  Description: Interventions:  - Encourage patient to monitor pain and request assistance  - Assess pain using appropriate pain scale  - Administer analgesics based on type and severity of pain and evaluate response  - Implement non-pharmacological measures as appropriate and evaluate response  - Consider cultural and social influences on pain and pain management  - Notify physician/advanced practitioner if interventions unsuccessful or patient reports new pain  Outcome: Progressing     Problem: INFECTION - ADULT  Goal: Absence or prevention of progression during hospitalization  Description: INTERVENTIONS:  - Assess and monitor for signs and symptoms of infection  - Monitor lab/diagnostic results  - Monitor all insertion sites, i e  indwelling lines, tubes, and drains  - Monitor endotracheal if appropriate and nasal secretions for changes in amount and color  - [place] appropriate cooling/warming therapies per order  - Administer medications as ordered  - Instruct and encourage patient and family to use good hand hygiene technique  - Identify and instruct in appropriate isolation precautions for identified infection/condition  Outcome: Progressing  Goal: Absence of fever/infection during neutropenic period  Description: INTERVENTIONS:  - Monitor WBC    Outcome: Progressing     Problem: SAFETY ADULT  Goal: Patient will remain free of falls  Description: INTERVENTIONS:  - Educate patient/family on patient safety including physical limitations  - Instruct patient to call for assistance with activity   - Consult OT/PT to assist with strengthening/mobility   - Keep Call bell within reach  - Keep bed low and locked with side rails adjusted as appropriate  - Keep care items and personal belongings within reach  - Initiate and maintain comfort rounds  - Make Fall Risk Sign visible to staff  - Offer Toileting every  Hours, in advance of need  - Initiate/Maintain alarm  - Obtain necessary fall risk management equipment  - Apply yellow socks and bracelet for high fall risk patients  - Consider moving patient to room near nurses station  Outcome: Progressing  Goal: Maintain or return to baseline ADL function  Description: INTERVENTIONS:  -  Assess patient's ability to carry out ADLs; assess patient's baseline for ADL function and identify physical deficits which impact ability to perform ADLs (bathing, care of mouth/teeth, toileting, grooming, dressing, etc )  - Assess/evaluate cause of self-care deficits   - Assess range of motion  - Assess patient's mobility; develop plan if impaired  - Assess patient's need for assistive devices and provide as appropriate  - Encourage maximum independence but intervene and supervise when necessary  - Involve family in performance of ADLs  - Assess for home care needs following discharge   - Consider OT consult to assist with ADL evaluation and planning for discharge  - Provide patient education as appropriate  Outcome: Progressing  Goal: Maintains/Returns to pre admission functional level  Description: INTERVENTIONS:  - Perform BMAT or MOVE assessment daily    - Set and communicate daily mobility goal to care team and patient/family/caregiver  - Collaborate with rehabilitation services on mobility goals if consulted  - Perform Range of Motion  times a day  - Reposition patient every  hours  - Dangle patient  times a day  - Stand patient  times a day  - Ambulate patient  times a day  - Out of bed to chair  times a day   - Out of bed for meals times a day  - Out of bed for toileting  - Record patient progress and toleration of activity level   Outcome: Progressing     Problem: Knowledge Deficit  Goal: Patient/family/caregiver demonstrates understanding of disease process, treatment plan, medications, and discharge instructions  Description: Complete learning assessment and assess knowledge base    Interventions:  - Provide teaching at level of understanding  - Provide teaching via preferred learning methods  Outcome: Progressing     Problem: DISCHARGE PLANNING  Goal: Discharge to home or other facility with appropriate resources  Description: INTERVENTIONS:  - Identify barriers to discharge w/patient and caregiver  - Arrange for needed discharge resources and transportation as appropriate  - Identify discharge learning needs (meds, wound care, etc )  - Arrange for interpretive services to assist at discharge as needed  - Refer to Case Management Department for coordinating discharge planning if the patient needs post-hospital services based on physician/advanced practitioner order or complex needs related to functional status, cognitive ability, or social support system  Outcome: Progressing

## 2023-02-27 NOTE — ANESTHESIA PREPROCEDURE EVALUATION
Procedure:  LABOR ANALGESIA    Relevant Problems   GI/HEPATIC   (+) GERD (gastroesophageal reflux disease)      GYN   (+) 36 weeks gestation of pregnancy      NEURO/PSYCH   (+) H/O  delivery, currently pregnant   (+) History of cholestasis during pregnancy        Physical Exam    Airway    Mallampati score: II  TM Distance: >3 FB  Neck ROM: full     Dental   No notable dental hx     Cardiovascular  Cardiovascular exam normal    Pulmonary  Pulmonary exam normal     Other Findings        Anesthesia Plan  ASA Score- 2     Anesthesia Type- epidural with ASA Monitors.         Additional Monitors:   Airway Plan:           Plan Factors-Exercise tolerance (METS): >4 METS.    Chart reviewed. Existing labs reviewed.     Patient is not a current smoker. Patient not instructed to abstain from smoking on day of procedure. Patient did not smoke on day of surgery.        Induction-     Postoperative Plan-     Informed Consent- Anesthetic plan and risks discussed with patient and spouse (Russian interpretor used via IPAD).  I personally reviewed this patient with the CRNA. Discussed and agreed on the Anesthesia Plan with the CRNA. (LABS pending).

## 2023-02-27 NOTE — PLAN OF CARE

## 2023-02-27 NOTE — DISCHARGE SUMMARY
Discharge Summary - OB/GYN   [place] Cliche Cerezo 24 y o  female MRN: [id number]  Unit/Bed#: LD  Encounter: [id number]      Admission Date: 2023     Discharge Date: 23    Admitting Diagnosis:   1  Pregnancy at 38 weeks   2  Uterine contractions   3  History of PTD   4  H/o HSV   5  H/o VAVD   6  H/o ICP   7  H/o Parathyroidectomy due to adenoma     Discharge Diagnosis:   Same, delivered      Procedures: spontaneous vaginal delivery    Delivery Attending: Get Evans MD  Discharge Attending: Dr Sally Garcia:     Nissa Israel is a 24 y o  Cleavon Bolus at 38w2d wks who was initially admitted for labor  Pregnancy was complicated by the above  She had no herpetic lesions on exam   On presentation she was 5/90/-1  She quickly progressed to complete and started to push  She delivered a viable female  on 23 at [address]  Weight 7lbs 1 4oz via spontaneous vaginal delivery  Apgars were 9 (1 min) and 9 (5 min)   was transferred to  nursery  Patient tolerated the procedure well and was transferred to recovery in stable condition  Her postpartum course was complicated by elevated blood pressure, which have normalized  Her postpartum pain was well controlled with oral analgesics  On day of discharge, she was ambulating and able to reasonably perform all ADLs  She was voiding and had appropriate bowel function  Pain was well controlled  She was discharged home on post-partum day #2 without complications  Patient was instructed to follow up with her OB as an outpatient and was given appropriate warnings to call provider if she develops signs of infection or uncontrolled pain  Complications: none apparent    Condition at discharge: good     Discharge instructions/Information to patient and family:   - Do not place anything (no partner, tampons or douche) in your vagina for 6 weeks    -You may walk for exercise for the first 6 weeks then gradually return to your usual activities    -Please do not drive for 1 week if you have no stitches and for 2 weeks if you have stitches or underwent a  delivery     -You may take baths or shower per your preference    -Please look at your bust (breasts) in the mirror daily and call for redness or tenderness or increased warmth    -Please call us for temperature > 100 4*F or 38* C, worsening pain or a foul discharge  Discharge Medications:   Prenatal vitamin daily for 6 months or the duration of nursing whichever is longer    Motrin 600 mg orally every 6 hours as needed for pain  Tylenol (over the counter) per bottle directions as needed for pain: do NOT use with percocet  Hydrocortisone cream 1% (over the counter) applied 1-2x daily to hemorrhoids as needed    Planned Readmission: Tenisha Delgado MD  23  5:44 AM

## 2023-02-28 PROBLEM — R03.0 ELEVATED BLOOD PRESSURE READING WITHOUT DIAGNOSIS OF HYPERTENSION: Status: ACTIVE | Noted: 2023-02-28

## 2023-02-28 RX ORDER — MEDROXYPROGESTERONE ACETATE 150 MG/ML
150 INJECTION, SUSPENSION INTRAMUSCULAR ONCE
Status: COMPLETED | OUTPATIENT
Start: 2023-02-28 | End: 2023-03-01

## 2023-02-28 RX ORDER — SIMETHICONE 80 MG
80 TABLET,CHEWABLE ORAL EVERY 6 HOURS PRN
Status: DISCONTINUED | OUTPATIENT
Start: 2023-02-28 | End: 2023-03-01 | Stop reason: HOSPADM

## 2023-02-28 RX ADMIN — DOCUSATE SODIUM 100 MG: 100 CAPSULE, LIQUID FILLED ORAL at 10:42

## 2023-02-28 RX ADMIN — DOCUSATE SODIUM 100 MG: 100 CAPSULE, LIQUID FILLED ORAL at 18:12

## 2023-02-28 RX ADMIN — ACETAMINOPHEN 650 MG: 325 TABLET ORAL at 20:01

## 2023-02-28 RX ADMIN — IBUPROFEN 600 MG: 600 TABLET ORAL at 16:21

## 2023-02-28 NOTE — ASSESSMENT & PLAN NOTE
Elevated Bps s/p methergine administration, not >5MZG apart  Systolic (92YHN), FLN:990 , Min:100 , THY:888   Diastolic (15CUK), QMM:50, Min:56, Max:60

## 2023-02-28 NOTE — LACTATION NOTE
This note was copied from a baby's chart  CONSULT - LACTATION  Baby Girl Riccardo Ricci) Wyatt 0 days female MRN: [id number]    [facility] NURSERY Room / Bed: (N)/(N) Encounter: [id number]    Maternal Information     MOTHER:  Gustavo Ko  Maternal Age: 24 y o    OB History: # 1 - Date: 10/16/16, Sex: Male, Weight: 2438 g (5 lb 6 oz), GA: 35w3d, Delivery: Vaginal, Vacuum (Extractor), Apgar1: 8, Apgar5: 9, Living: Living, Birth Comments: None    # 2 - Date: 23, Sex: Female, Weight: 3215 g (7 lb 1 4 oz), GA: 38w2d, Delivery: Vaginal, Spontaneous, Apgar1: 9, Apgar5: 9, Living: Living, Birth Comments: None   Previouse breast reduction surgery? No    Lactation history:   Has patient previously breast fed: No   How long had patient previously breast fed:     Previous breast feeding complications:       Past Surgical History:   Procedure Laterality Date   • ME PARATHYROIDECTOMY/EXPLORATION PARATHYROIDS Right 06/10/2021    Procedure: Right minimally invasive parathyroidectomy; intraoperative PTH monitoring;  Surgeon: Devika Vasquez MD;  Location: [facility];  Service: Surgical Oncology        Birth information:  YOB: 2021   Time of birth: 3:80 AM   Sex: female   Delivery type: Vaginal, Spontaneous   Birth Weight: 3215 g (7 lb 1 4 oz)   Percent of Weight Change: 0%     Gestational Age: 36w4d   [unfilled]    Assessment     Breast and nipple assessment: normal assessment     Assessment: no clinical assessment    Feeding assessment: feeding well as per mom  LATCH:  Latch: Repeated attempts, hold nipple in mouth, stimulate to suck   Audible Swallowing: A few with stimulation   Type of Nipple:     Comfort (Breast/Nipple): Soft/non-tender   Hold (Positioning): No assist from staff, mother able to position/hold infant   LATCH Score: 6          Feeding recommendations:  breast feed on demand   Spencer # [id number]   Review of RSB / DC  - mom has a pump at home    Enc  Both breasts at every feeding  Enc  To call lactation for next latch  Information on hand expression given  Discussed benefits of knowing how to manually express breast including stimulating milk supply, softening nipple for latch and evacuating breast in the event of engorgement  Mom is encouraged to place baby skin to skin for feedings  Skin to skin education provided for baby placement on mother's chest, baby only in diaper, blankets below shoulders on baby's back  Skin to skin is encouraged to continue at home for feedings and between feedings  Worked on positioning infant up at chest level and starting to feed infant with nose arriving at the nipple  Then, using areolar compression to achieve a deep latch that is comfortable and exchanges optimum amounts of milk  - Start feedings on breast that last feeding ended   - allow no more than 3 hours between breast feeding sessions   - time between feedings is counted from the beginning of the first feed to the beginning of the next feeding session    Reviewed early signs of hunger, including tensing of hands and shoulders - no need to wait for open eyes  Crying is a late hunger sign  If baby is crying, soothe baby first and then attempt to latch  Reviewed normal sucking patterns: transition from stimulation to nutritive to release or non-nutritive  The goal is to see and hear lots of swallowing  Reviewed normal nursing pattern: infant could latch on one breast up to 30 minutes or until releases on own  Signs of satiation is open hand with fingers that do not grab your finger  Discussed difference in sensation of non-nutritive v nutritive sucking    Met with mother  Provided mother with Ready, Set, Baby booklet  Discussed Skin to Skin contact an benefits to mom and baby  Talked about the delay of the first bath until baby has adjusted  Spoke about the benefits of rooming in   Feeding on cue and what that means for recognizing infant's hunger  Avoidance of pacifiers for the first month discussed  Talked about exclusive breastfeeding for the first 6 months  Positioning and latch reviewed as well as showing images of other feeding positions  Discussed the properties of a good latch in any position  Reviewed hand/manual expression  Discussed s/s that baby is getting enough milk and some s/s that breastfeeding dyad may need further help  Gave information on common concerns, what to expect the first few weeks after delivery, preparing for other caregivers, and how partners can help  Resources for support also provided  Encouraged parents to call for assistance, questions, and concerns about breastfeeding  Extension provided  Provided education on growth spurts, when to introduce bottles; paced bottle feeding, and non-nutritive suck at the breast  Provided education on Signs of satiation  Encouraged to call lactation to observe a latch prior to discharge for reassurance  Encouraged to call baby and me with any questions and closely monitor output        Kartik Patel 6/87/4925 8:41 PM

## 2023-02-28 NOTE — PLAN OF CARE

## 2023-02-28 NOTE — ASSESSMENT & PLAN NOTE
Lochia WNL   Recovering well   Appropriate bowel and bladder function   Pain well controlled; gas pain- symethicone ordered  Tolerating diet   Encourage breastfeeding   Ambulating without issues   No lower extremity tenderness  GBS negative  Rh positive   Contraception: Depo-Provera

## 2023-02-28 NOTE — PROGRESS NOTES
Progress Note - OB/GYN  Ma Book Wyatt 24 y o  female MRN: [id number]  Unit/Bed#: -01 Encounter: [id number]    Assessment and [address] is a patient of: [address] [place] [address]  She is PPD# 1 s/p  spontaneous vaginal delivery  Recovering well and is stable       Elevated blood pressure reading without diagnosis of hypertension  Assessment & Plan  Elevated Bps s/p methergine administration, not >8OQI apart  Systolic (09OHK), RND:996 , Min:100 , MIMA:620   Diastolic (20WHY), DBP:82, Min:56, Max:60          Uterine atony, postpartum, without hemorrhage  Assessment & Plan   mL, Hgb 13 3  S/p rectal cytotec, methergine, TXA, 8hr bag of pitocin     (spontaneous vaginal delivery)  Assessment & Plan  Lochia WNL   Recovering well   Appropriate bowel and bladder function   Pain well controlled; gas pain- symethicone ordered  Tolerating diet   Encourage breastfeeding   Ambulating without issues   No lower extremity tenderness  GBS negative  Rh positive   Contraception: Depo-Provera      Rubella non-immune status, antepartum  Assessment & Plan  MMR ordered    Cystic fibrosis carrier  Assessment & Plan  Patient reports partner tested in prior pregnancy and was not a carrier  Herpes simplex infection  Assessment & Plan  On Valtrex suppresion  No lesions on Speculum  Exam       Disposition    - Anticipate discharge home on PPD# 2      Subjective/Objective     Chief Complaint: Postpartum State     Subjective:    Lele Members is PPD#1 s/p  spontaneous vaginal delivery  She reports feeling dizzy upon standing, and she feels gas pain  Pain is well controlled  Patient is currently voiding  She is ambulating  Patient is currently passing flatus and has had no bowel movement  She is tolerating PO, and denies nausea or vomitting  Patient denies fever, chills, chest pain, shortness of breath, or calf tenderness  Lochia is normal  She is  Breastfeeding   She is recovering well and is stable  Vitals:   /56 (BP Location: Left arm)   Pulse 66   Temp 98 5 °F (36 9 °C) (Oral)   Resp 16   Ht 5' (1 524 m)   Wt 59 9 kg (132 lb)   LMP 06/04/2022   SpO2 96%   Breastfeeding Yes   BMI 25 78 kg/m²     No intake or output data in the 24 hours ending 02/28/23 0644    Invasive Devices     Peripheral Intravenous Line  Duration           Peripheral IV 02/27/23 Right Antecubital 1 day                Physical Exam:   GEN: Jaci Ambrocio appears well, alert and oriented x 3, pleasant and cooperative   CARDIO: RRR, no murmurs or rubs  RESP:  CTAB, no wheezes or rales  ABDOMEN: soft, no tenderness, no distention, fundus @ umbilicus  EXTREMITIES: SCDs on, non tender, no erythema      Labs:     Hemoglobin   Date Value Ref Range Status   02/27/2023 13 3 11 5 - 15 4 g/dL Final   12/27/2022 11 7 11 5 - 15 4 g/dL Final     WBC   Date Value Ref Range Status   02/27/2023 10 05 4 31 - 10 16 Thousand/uL Final   12/27/2022 8 64 4 31 - 10 16 Thousand/uL Final     Platelets   Date Value Ref Range Status   02/27/2023 242 149 - 390 Thousands/uL Final   12/27/2022 268 149 - 390 Thousands/uL Final     Creatinine   Date Value Ref Range Status   02/27/2023 0 59 (L) 0 60 - 1 30 mg/dL Final     Comment:     Standardized to IDMS reference method   12/27/2022 0 52 (L) 0 60 - 1 30 mg/dL Final     Comment:     Standardized to IDMS reference method     AST   Date Value Ref Range Status   02/27/2023 17 13 - 39 U/L Final     Comment:     Specimen collection should occur prior to Sulfasalazine administration due to the potential for falsely depressed results  12/27/2022 15 5 - 45 U/L Final     Comment:     Specimen collection should occur prior to Sulfasalazine administration due to the potential for falsely depressed results        ALT   Date Value Ref Range Status   02/27/2023 9 7 - 52 U/L Final     Comment:     Specimen collection should occur prior to Sulfasalazine administration due to the potential for falsely depressed results  12/27/2022 20 12 - 78 U/L Final     Comment:     Specimen collection should occur prior to Sulfasalazine and/or Sulfapyridine administration due to the potential for falsely depressed results             Stefano Sierra MD  2/28/2023  6:44 AM

## 2023-02-28 NOTE — PLAN OF CARE
Problem: POSTPARTUM  Goal: Experiences normal postpartum course  Description: INTERVENTIONS:  - Monitor maternal vital signs  - Assess uterine involution and lochia  Outcome: Progressing  Goal: Appropriate maternal -  bonding  Description: INTERVENTIONS:  - Identify family support  - Assess for appropriate maternal/infant bonding   -Encourage maternal/infant bonding opportunities  - Referral to  or  as needed  Outcome: Progressing  Goal: Establishment of infant feeding pattern  Description: INTERVENTIONS:  - Assess breast/bottle feeding  - Refer to lactation as needed  Outcome: Progressing  Goal: Incision(s), wounds(s) or drain site(s) healing without S/S of infection  Description: INTERVENTIONS  - Assess and document dressing, incision, wound bed, drain sites and surrounding tissue  - Provide patient and family education  - Perform skin care/dressing changes  Outcome: Progressing     Problem: PAIN - ADULT  Goal: Verbalizes/displays adequate comfort level or baseline comfort level  Description: Interventions:  - Encourage patient to monitor pain and request assistance  - Assess pain using appropriate pain scale  - Administer analgesics based on type and severity of pain and evaluate response  - Implement non-pharmacological measures as appropriate and evaluate response  - Consider cultural and social influences on pain and pain management  - Notify physician/advanced practitioner if interventions unsuccessful or patient reports new pain  Outcome: Progressing     Problem: INFECTION - ADULT  Goal: Absence or prevention of progression during hospitalization  Description: INTERVENTIONS:  - Assess and monitor for signs and symptoms of infection  - Monitor lab/diagnostic results  - Monitor all insertion sites, i e  indwelling lines, tubes, and drains  - Monitor endotracheal if appropriate and nasal secretions for changes in amount and color  - [place] appropriate cooling/warming therapies per order  - Administer medications as ordered  - Instruct and encourage patient and family to use good hand hygiene technique  - Identify and instruct in appropriate isolation precautions for identified infection/condition  Outcome: Progressing  Goal: Absence of fever/infection during neutropenic period  Description: INTERVENTIONS:  - Monitor WBC    Outcome: Progressing     Problem: SAFETY ADULT  Goal: Patient will remain free of falls  Description: INTERVENTIONS:  - Educate patient/family on patient safety including physical limitations  - Instruct patient to call for assistance with activity   - Consult OT/PT to assist with strengthening/mobility   - Keep Call bell within reach  - Keep bed low and locked with side rails adjusted as appropriate  - Keep care items and personal belongings within reach  - Initiate and maintain comfort rounds  - Obtain necessary fall risk management equipment  - Apply yellow socks and bracelet for high fall risk patients  - Consider moving patient to room near nurses station  Outcome: Progressing  Goal: Maintain or return to baseline ADL function  Description: INTERVENTIONS:  -  Assess patient's ability to carry out ADLs; assess patient's baseline for ADL function and identify physical deficits which impact ability to perform ADLs (bathing, care of mouth/teeth, toileting, grooming, dressing, etc )  - Assess/evaluate cause of self-care deficits   - Assess range of motion  - Assess patient's mobility; develop plan if impaired  - Assess patient's need for assistive devices and provide as appropriate  - Encourage maximum independence but intervene and supervise when necessary  - Involve family in performance of ADLs  - Assess for home care needs following discharge   - Consider OT consult to assist with ADL evaluation and planning for discharge  - Provide patient education as appropriate  Outcome: Progressing  Goal: Maintains/Returns to pre admission functional level  Description: INTERVENTIONS:  - Perform BMAT or MOVE assessment daily    - Set and communicate daily mobility goal to care team and patient/family/caregiver  - Collaborate with rehabilitation services on mobility goals if consulted  - Stand patient   - Ambulate patient   - Out of bed to chair   - Out of bed for meals   - Out of bed for toileting  - Record patient progress and toleration of activity level   Outcome: Progressing     Problem: Knowledge Deficit  Goal: Patient/family/caregiver demonstrates understanding of disease process, treatment plan, medications, and discharge instructions  Description: Complete learning assessment and assess knowledge base    Interventions:  - Provide teaching at level of understanding  - Provide teaching via preferred learning methods  Outcome: Progressing     Problem: DISCHARGE PLANNING  Goal: Discharge to home or other facility with appropriate resources  Description: INTERVENTIONS:  - Identify barriers to discharge w/patient and caregiver  - Arrange for needed discharge resources and transportation as appropriate  - Identify discharge learning needs (meds, wound care, etc )  - Arrange for interpretive services to assist at discharge as needed  - Refer to Case Management Department for coordinating discharge planning if the patient needs post-hospital services based on physician/advanced practitioner order or complex needs related to functional status, cognitive ability, or social support system  Outcome: Progressing

## 2023-03-01 VITALS
TEMPERATURE: 98.3 F | SYSTOLIC BLOOD PRESSURE: 102 MMHG | HEART RATE: 80 BPM | OXYGEN SATURATION: 99 % | RESPIRATION RATE: 18 BRPM | BODY MASS INDEX: 25.91 KG/M2 | DIASTOLIC BLOOD PRESSURE: 67 MMHG | HEIGHT: 60 IN | WEIGHT: 132 LBS

## 2023-03-01 PROBLEM — Z3A.38 38 WEEKS GESTATION OF PREGNANCY: Status: RESOLVED | Noted: 2022-10-04 | Resolved: 2023-03-01

## 2023-03-01 PROBLEM — O47.9 UTERINE CONTRACTIONS: Status: RESOLVED | Noted: 2023-02-27 | Resolved: 2023-03-01

## 2023-03-01 RX ORDER — ACETAMINOPHEN 325 MG/1
650 TABLET ORAL EVERY 6 HOURS PRN
Refills: 0
Start: 2023-03-01

## 2023-03-01 RX ORDER — DOCUSATE SODIUM 100 MG/1
100 CAPSULE, LIQUID FILLED ORAL 2 TIMES DAILY
Refills: 0
Start: 2023-03-01

## 2023-03-01 RX ORDER — IBUPROFEN 600 MG/1
600 TABLET ORAL EVERY 6 HOURS PRN
Qty: 30 TABLET | Refills: 0
Start: 2023-03-01

## 2023-03-01 RX ORDER — MEDROXYPROGESTERONE ACETATE 150 MG/ML
150 INJECTION, SUSPENSION INTRAMUSCULAR ONCE
Qty: 1 ML | Refills: 0 | Status: SHIPPED | OUTPATIENT
Start: 2023-03-01 | End: 2023-03-20 | Stop reason: SDUPTHER

## 2023-03-01 RX ADMIN — IBUPROFEN 600 MG: 600 TABLET ORAL at 01:50

## 2023-03-01 RX ADMIN — MEASLES, MUMPS, AND RUBELLA VIRUS VACCINE LIVE 0.5 ML: 1000; 12500; 1000 INJECTION, POWDER, LYOPHILIZED, FOR SUSPENSION SUBCUTANEOUS at 14:53

## 2023-03-01 RX ADMIN — DOCUSATE SODIUM 100 MG: 100 CAPSULE, LIQUID FILLED ORAL at 08:20

## 2023-03-01 RX ADMIN — MEDROXYPROGESTERONE ACETATE 150 MG: 150 INJECTION, SUSPENSION, EXTENDED RELEASE INTRAMUSCULAR at 14:53

## 2023-03-01 NOTE — PLAN OF CARE
Problem: POSTPARTUM  Goal: Experiences normal postpartum course  Description: INTERVENTIONS:  - Monitor maternal vital signs  - Assess uterine involution and lochia  Outcome: Progressing  Goal: Appropriate maternal -  bonding  Description: INTERVENTIONS:  - Identify family support  - Assess for appropriate maternal/infant bonding   -Encourage maternal/infant bonding opportunities  - Referral to  or  as needed  Outcome: Progressing  Goal: Establishment of infant feeding pattern  Description: INTERVENTIONS:  - Assess breast/bottle feeding  - Refer to lactation as needed  Outcome: Progressing  Goal: Incision(s), wounds(s) or drain site(s) healing without S/S of infection  Description: INTERVENTIONS  - Assess and document dressing, incision, wound bed, drain sites and surrounding tissue  - Provide patient and family education  - Perform skin care/dressing changes every   Outcome: Progressing     Problem: PAIN - ADULT  Goal: Verbalizes/displays adequate comfort level or baseline comfort level  Description: Interventions:  - Encourage patient to monitor pain and request assistance  - Assess pain using appropriate pain scale  - Administer analgesics based on type and severity of pain and evaluate response  - Implement non-pharmacological measures as appropriate and evaluate response  - Consider cultural and social influences on pain and pain management  - Notify physician/advanced practitioner if interventions unsuccessful or patient reports new pain  Outcome: Progressing     Problem: INFECTION - ADULT  Goal: Absence or prevention of progression during hospitalization  Description: INTERVENTIONS:  - Assess and monitor for signs and symptoms of infection  - Monitor lab/diagnostic results  - Monitor all insertion sites, i e  indwelling lines, tubes, and drains  - Monitor endotracheal if appropriate and nasal secretions for changes in amount and color  - [place] appropriate cooling/warming therapies per order  - Administer medications as ordered  - Instruct and encourage patient and family to use good hand hygiene technique  - Identify and instruct in appropriate isolation precautions for identified infection/condition  Outcome: Progressing  Goal: Absence of fever/infection during neutropenic period  Description: INTERVENTIONS:  - Monitor WBC    Outcome: Progressing     Problem: SAFETY ADULT  Goal: Patient will remain free of falls  Description: INTERVENTIONS:  - Educate patient/family on patient safety including physical limitations  - Instruct patient to call for assistance with activity   - Consult OT/PT to assist with strengthening/mobility   - Keep Call bell within reach  - Keep bed low and locked with side rails adjusted as appropriate  - Keep care items and personal belongings within reach  - Initiate and maintain comfort rounds  - Make Fall Risk Sign visible to staff  - Offer Toileting every  Hours, in advance of need  - Initiate/Maintain alarm  - Obtain necessary fall risk management equipment:   - Apply yellow socks and bracelet for high fall risk patients  - Consider moving patient to room near nurses station  Outcome: Progressing  Goal: Maintain or return to baseline ADL function  Description: INTERVENTIONS:  -  Assess patient's ability to carry out ADLs; assess patient's baseline for ADL function and identify physical deficits which impact ability to perform ADLs (bathing, care of mouth/teeth, toileting, grooming, dressing, etc )  - Assess/evaluate cause of self-care deficits   - Assess range of motion  - Assess patient's mobility; develop plan if impaired  - Assess patient's need for assistive devices and provide as appropriate  - Encourage maximum independence but intervene and supervise when necessary  - Involve family in performance of ADLs  - Assess for home care needs following discharge   - Consider OT consult to assist with ADL evaluation and planning for discharge  - Provide patient education as appropriate  Outcome: Progressing  Goal: Maintains/Returns to pre admission functional level  Description: INTERVENTIONS:  - Perform BMAT or MOVE assessment daily    - Set and communicate daily mobility goal to care team and patient/family/caregiver  - Collaborate with rehabilitation services on mobility goals if consulted  - Perform Range of Motion  times a day  - Reposition patient every  hours  - Dangle patient  times a day  - Stand patient  times a day  - Ambulate patient  times a day  - Out of bed to chair  times a day   - Out of bed for meals  times a day  - Out of bed for toileting  - Record patient progress and toleration of activity level   Outcome: Progressing     Problem: Knowledge Deficit  Goal: Patient/family/caregiver demonstrates understanding of disease process, treatment plan, medications, and discharge instructions  Description: Complete learning assessment and assess knowledge base    Interventions:  - Provide teaching at level of understanding  - Provide teaching via preferred learning methods  Outcome: Progressing     Problem: DISCHARGE PLANNING  Goal: Discharge to home or other facility with appropriate resources  Description: INTERVENTIONS:  - Identify barriers to discharge w/patient and caregiver  - Arrange for needed discharge resources and transportation as appropriate  - Identify discharge learning needs (meds, wound care, etc )  - Arrange for interpretive services to assist at discharge as needed  - Refer to Case Management Department for coordinating discharge planning if the patient needs post-hospital services based on physician/advanced practitioner order or complex needs related to functional status, cognitive ability, or social support system  Outcome: Progressing

## 2023-03-01 NOTE — PLAN OF CARE
Problem: POSTPARTUM  Goal: Experiences normal postpartum course  Description: INTERVENTIONS:  - Monitor maternal vital signs  - Assess uterine involution and lochia  3/1/2023 1148 by Daniel Dove RN  Outcome: Completed  3/1/2023 0833 by Daniel Dove RN  Outcome: Progressing  Goal: Appropriate maternal -  bonding  Description: INTERVENTIONS:  - Identify family support  - Assess for appropriate maternal/infant bonding   -Encourage maternal/infant bonding opportunities  - Referral to  or  as needed  3/1/2023 1148 by Daniel Dove RN  Outcome: Completed  3/1/2023 0833 by Daniel Dove RN  Outcome: Progressing  Goal: Establishment of infant feeding pattern  Description: INTERVENTIONS:  - Assess breast/bottle feeding  - Refer to lactation as needed  3/1/2023 1148 by Daniel Dove RN  Outcome: Completed  3/1/2023 0833 by Daniel Dove RN  Outcome: Progressing  Goal: Incision(s), wounds(s) or drain site(s) healing without S/S of infection  Description: INTERVENTIONS  - Assess and document dressing, incision, wound bed, drain sites and surrounding tissue  - Provide patient and family education  3/1/2023 1148 by Daniel Dove RN  Outcome: Completed  3/1/2023 0833 by Daniel Dove RN  Outcome: Progressing     Problem: PAIN - ADULT  Goal: Verbalizes/displays adequate comfort level or baseline comfort level  Description: Interventions:  - Encourage patient to monitor pain and request assistance  - Assess pain using appropriate pain scale  - Administer analgesics based on type and severity of pain and evaluate response  - Implement non-pharmacological measures as appropriate and evaluate response  - Consider cultural and social influences on pain and pain management  - Notify physician/advanced practitioner if interventions unsuccessful or patient reports new pain  3/1/2023 1148 by Daniel Dove RN  Outcome: Completed  3/1/2023 0833 by Daniel Dove RN  Outcome: Progressing     Problem: INFECTION - ADULT  Goal: Absence or prevention of progression during hospitalization  Description: INTERVENTIONS:  - Assess and monitor for signs and symptoms of infection  - Monitor lab/diagnostic results  - Monitor all insertion sites, i e  indwelling lines, tubes, and drains  - Monitor endotracheal if appropriate and nasal secretions for changes in amount and color  - [place] appropriate cooling/warming therapies per order  - Administer medications as ordered  - Instruct and encourage patient and family to use good hand hygiene technique  - Identify and instruct in appropriate isolation precautions for identified infection/condition  3/1/2023 1148 by Eh Donis RN  Outcome: Completed  3/1/2023 0833 by Eh Donis RN  Outcome: Progressing  Goal: Absence of fever/infection during neutropenic period  Description: INTERVENTIONS:  - Monitor WBC    3/1/2023 1148 by Eh Donis RN  Outcome: Completed  3/1/2023 0833 by Eh Donis RN  Outcome: Progressing     Problem: SAFETY ADULT  Goal: Patient will remain free of falls  Description: INTERVENTIONS:  - Educate patient/family on patient safety including physical limitations  - Instruct patient to call for assistance with activity   - Consult OT/PT to assist with strengthening/mobility   - Keep Call bell within reach  - Keep bed low and locked with side rails adjusted as appropriate  - Keep care items and personal belongings within reach  - Initiate and maintain comfort rounds  - Make Fall Risk Sign visible to staff  - Apply yellow socks and bracelet for high fall risk patients  - Consider moving patient to room near nurses station  3/1/2023 1148 by Eh Donis RN  Outcome: Completed  3/1/2023 0833 by Eh Donis RN  Outcome: Progressing  Goal: Maintain or return to baseline ADL function  Description: INTERVENTIONS:  -  Assess patient's ability to carry out ADLs; assess patient's baseline for ADL function and identify physical deficits which impact ability to perform ADLs (bathing, care of mouth/teeth, toileting, grooming, dressing, etc )  - Assess/evaluate cause of self-care deficits   - Assess range of motion  - Assess patient's mobility; develop plan if impaired  - Assess patient's need for assistive devices and provide as appropriate  - Encourage maximum independence but intervene and supervise when necessary  - Involve family in performance of ADLs  - Assess for home care needs following discharge   - Consider OT consult to assist with ADL evaluation and planning for discharge  - Provide patient education as appropriate  3/1/2023 1148 by Naomi Murphy RN  Outcome: Completed  3/1/2023 0833 by Naomi Murphy RN  Outcome: Progressing  Goal: Maintains/Returns to pre admission functional level  Description: INTERVENTIONS:  - Perform BMAT or MOVE assessment daily    - Set and communicate daily mobility goal to care team and patient/family/caregiver  - Collaborate with rehabilitation services on mobility goals if consulted  - Out of bed for toileting  - Record patient progress and toleration of activity level   3/1/2023 1148 by Naomi Murphy RN  Outcome: Completed  3/1/2023 0833 by Naomi Murphy RN  Outcome: Progressing     Problem: Knowledge Deficit  Goal: Patient/family/caregiver demonstrates understanding of disease process, treatment plan, medications, and discharge instructions  Description: Complete learning assessment and assess knowledge base    Interventions:  - Provide teaching at level of understanding  - Provide teaching via preferred learning methods  3/1/2023 1148 by Naomi Murphy RN  Outcome: Completed  3/1/2023 0833 by Naomi Murphy RN  Outcome: Progressing     Problem: DISCHARGE PLANNING  Goal: Discharge to home or other facility with appropriate resources  Description: INTERVENTIONS:  - Identify barriers to discharge w/patient and caregiver  - Arrange for needed discharge resources and transportation as appropriate  - Identify discharge learning needs (meds, wound care, etc )  - Arrange for interpretive services to assist at discharge as needed  - Refer to Case Management Department for coordinating discharge planning if the patient needs post-hospital services based on physician/advanced practitioner order or complex needs related to functional status, cognitive ability, or social support system  3/1/2023 1148 by Nicki Ventura, RN  Outcome: Completed  3/1/2023 0833 by Nicki Ventura, RN  Outcome: Progressing

## 2023-03-01 NOTE — PROGRESS NOTES
Progress Note - OB/GYN  Ti Schneider 24 y o  female MRN: [id number]  Unit/Bed#: -01 Encounter: [id number]    [facility]  She is PPD# 2 s/p  spontaneous vaginal delivery  Recovering well and is stable       No new Assessment & Plan notes have been filed under this hospital service since the last note was generated  Service: Family Medicine      Disposition    - Anticipate discharge home on PPD# 2      Subjective/Objective     Chief Complaint: Postpartum State     Subjective:    Rogelio Darling is PPD/POD#2 s/p  spontaneous vaginal delivery  She has no current complaints  Pain is well controlled  Patient is currently voiding  She is ambulating  Patient is currently passing flatus and has had bowel movement  She is tolerating PO, and denies nausea or vomitting  Patient denies fever, chills, chest pain, shortness of breath, or calf tenderness  Lochia is normal  She is  Breastfeeding  She is recovering well and is stable         Vitals:   /68 (BP Location: Right arm)   Pulse 82   Temp 98 9 °F (37 2 °C) (Oral)   Resp 20   Ht 5' (1 524 m)   Wt 59 9 kg (132 lb)   LMP 06/04/2022   SpO2 99%   Breastfeeding Yes   BMI 25 78 kg/m²     No intake or output data in the 24 hours ending 03/01/23 0648    Invasive Devices     None                 Physical Exam:   GEN: Rogelio Darling appears well, alert and oriented x 3, pleasant and cooperative   CARDIO: RRR, no murmurs or rubs  RESP:  CTAB, no wheezes or rales  ABDOMEN: soft, no tenderness, no distention, fundus @ -1  EXTREMITIES: SCDs on, non tender, no erythema, b/l Jamal's sign negative      Labs:     Hemoglobin   Date Value Ref Range Status   02/27/2023 13 3 11 5 - 15 4 g/dL Final   12/27/2022 11 7 11 5 - 15 4 g/dL Final     WBC   Date Value Ref Range Status   02/27/2023 10 05 4 31 - 10 16 Thousand/uL Final   12/27/2022 8 64 4 31 - 10 16 Thousand/uL Final     Platelets   Date Value Ref Range Status   02/27/2023 242 149 - 390 Thousands/uL Final   12/27/2022 268 149 - 390 Thousands/uL Final     Creatinine   Date Value Ref Range Status   02/27/2023 0 59 (L) 0 60 - 1 30 mg/dL Final     Comment:     Standardized to IDMS reference method   12/27/2022 0 52 (L) 0 60 - 1 30 mg/dL Final     Comment:     Standardized to IDMS reference method     AST   Date Value Ref Range Status   02/27/2023 17 13 - 39 U/L Final     Comment:     Specimen collection should occur prior to Sulfasalazine administration due to the potential for falsely depressed results  12/27/2022 15 5 - 45 U/L Final     Comment:     Specimen collection should occur prior to Sulfasalazine administration due to the potential for falsely depressed results  ALT   Date Value Ref Range Status   02/27/2023 9 7 - 52 U/L Final     Comment:     Specimen collection should occur prior to Sulfasalazine administration due to the potential for falsely depressed results  12/27/2022 20 12 - 78 U/L Final     Comment:     Specimen collection should occur prior to Sulfasalazine and/or Sulfapyridine administration due to the potential for falsely depressed results             Inocente Felty, MD  3/1/2023  6:48 AM

## 2023-03-01 NOTE — PLAN OF CARE

## 2023-03-01 NOTE — LACTATION NOTE
This note was copied from a baby's chart  CONSULT - LACTATION  Baby Girl Jayde Torres Wyatt 2 days female MRN: [id number]    [facility] NURSERY Room / Bed: (N)/(N) Encounter: [id number]    Maternal Information     MOTHER:  Kristofer Schaffer  Maternal Age: 24 y o    OB History: # 1 - Date: 10/16/16, Sex: Male, Weight: 2438 g (5 lb 6 oz), GA: 35w3d, Delivery: Vaginal, Vacuum (Extractor), Apgar1: 8, Apgar5: 9, Living: Living, Birth Comments: None    # 2 - Date: 23, Sex: Female, Weight: 3215 g (7 lb 1 4 oz), GA: 38w2d, Delivery: Vaginal, Spontaneous, Apgar1: 9, Apgar5: 9, Living: Living, Birth Comments: None   Previouse breast reduction surgery?  No    Lactation history:   Has patient previously breast fed: No   How long had patient previously breast fed:     Previous breast feeding complications:       Past Surgical History:   Procedure Laterality Date   • OR PARATHYROIDECTOMY/EXPLORATION PARATHYROIDS Right 06/10/2021    Procedure: Right minimally invasive parathyroidectomy; intraoperative PTH monitoring;  Surgeon: Patsy Lezama MD;  [facility];  Service: Surgical Oncology        Birth information:  YOB: 2021   Time of birth: 3:80 AM   Sex: female   Delivery type: Vaginal, Spontaneous   Birth Weight: 3215 g (7 lb 1 4 oz)   Percent of Weight Change: -6%     Gestational Age: 36w4d   [unfilled]    Assessment     Breast and nipple assessment: sore nipple and bruised nipples     Assessment: normal assessment    Feeding assessment: mother could not tolerate latch, unlatched given manual breast pump to use unitl home to use double breast pump  LATCH:  Latch: Grasps breast, tongue down, lips flanged, rhythmic sucking   Audible Swallowing: Spontaneous and intermittent (24 hours old)   Type of Nipple: Everted (After stimulation)   Comfort (Breast/Nipple): Filling, red/small blisters/bruises, mild/moderate discomfort   Hold (Positioning): Partial assist, teach one side, mother does other, staff holds   [facility] Score: 8          Feeding recommendations: Mother was educated to pump/perfrom hand expression if not able to latch due to discomfort  Mother is planning on pumping until nipples heal, encouraged to use expressed colostrum/lanolin      Met with mother to follow up and discuss the Breastfeeding Discharge Booklet  Mother is very sore, tender and nipples are bruised on assessment  Baby was brought to the breast to see how mother latches, but was in discomfort and baby did not feed at the breast  Mother is supplementing until nipples heal, using expressed colostrum and lanolin to keep moist  Mother was given a manual hand pump to use to stimulate and protect her milk supply and will be using her double breast pump at home  Showed the feeding log to could be continued using once home for up to the week and discussed the importance of ensuring that baby feeds 8-12x in 24 hours and that baby has 6-8 wet diapers as well as 3-4 soiled diapers (looking for stool transition from meconium to a yellow/gold seedy loose stool)  Mother given resources to look up medications to ensure they are safe with breastfeeding, by communicating with the [facility], [address] as well as using [employer] (assisted mother to pin to home screen on personal phone)    Discussed engorgement time frame (when mature milk comes in) and management as well as how to deal with conditions that may occur while breastfeeding (plugged ducts, milk blebs and mastitis) and when is appropriate to communicate with her OB/GYN and/or a lactation consultant  Mother is comfortable with how to set up a pump, how to cycle (stimulation vs expression phases during a pumping session), flange fit, milk storage and cleaning  Mother shown handouts for tips on pumping when returning to work and paced bottle feeding that was discussed and demonstrated      Mother shown community resources for continued support in breastfeeding once discharged home  She was encouraged to communicate with [address], [facility] for lactation home visits and/or with her baby's pediatrician for lactation support/services that could be offered in the practice  Mother was encouraged to call for further questions that arise prior to discharge  Education and encounter occurred in [address] [place] [address], primary language of mother      Mihai Copeland RN 3/1/2023 12:10 PM

## 2023-03-02 NOTE — UTILIZATION REVIEW
NOTIFICATION OF INPATIENT ADMISSION   MATERNITY/DELIVERY AUTHORIZATION REQUEST   SERVICING FACILITY:   [facility] - L&D, , NICU  [address]  Tax ID: [id number]  NPI: [id number]   ATTENDING PROVIDER:  Attending Name and NPI#: Francisco Chambers [[id number]]  Address: [address] [place] [address]  Phone: [phone]   ADMISSION INFORMATION:  Place of Service: [facility]patient [facility]  Place of Service Code: 21  Inpatient Admission Date/Time: 23 12:24 AM  Discharge Date/Time: 3/1/2023  3:45 PM  Admitting Diagnosis Code/Description:  Uterine contractions at greater than 20 weeks of gestation [O47 9]  38 weeks gestation of pregnancy [Z3A 38]  Encounter for full-term uncomplicated delivery [E07]     Mother: Rainey Cheadle 1995 Estimated Date of Delivery: 3/11/23  Delivering clinician: Rosangela Chávez    OB History as of 3/1/2023        2    Para   2    Term   1       1    AB        Living   2       SAB        IAB        Ectopic        Multiple   0    Live Births   2                Name & MRN:   Information for the patient's :  [place] Duos Landon Garcia) [[id number]]     [place] Delivery Information:  Sex: female  Delivered 2023 1:09 AM by Vaginal, Spontaneous; Gestational Age: 36w4d     Measurements:  Weight: 7 lb 1 4 oz (3215 g); Height: 18"    APGAR 1 minute 5 minutes 10 minutes   Totals: 9 9      [place] Birth Information: 24 y o  female MRN: [id number] Unit/Bed#: -01   Birthweight: No birth weight on file  Gestational Age: <None> Delivery Type:    APGARS Totals:        UTILIZATION REVIEW CONTACT:  Harvinder Bowser Utilization   Network Utilization Review Department  Phone: [phone]  Fax [phone]  Email: [email or web address]  Contact for approvals/pending authorizations, clinical reviews, and discharge       PHYSICIAN ADVISORY SERVICES:  Medical Necessity Denial & Vbwv-qh-Lgkp Review  Phone: [phone]  Fax: [phone]  Email: [email or web address]

## 2023-03-05 LAB — PLACENTA IN STORAGE: NORMAL

## 2023-03-08 NOTE — UTILIZATION REVIEW
NOTIFICATION OF INPATIENT ADMISSION   MATERNITY/DELIVERY AUTHORIZATION REQUEST   SERVICING FACILITY:   [facility] - L&D, [place], NICU  [address]  Tax ID: [id number]  NPI: [id number]   ATTENDING PROVIDER:  Attending Name and NPI#: Francisco Simpson [[id number]]  Address: [address] [place] [address]  Phone: [phone]   ADMISSION INFORMATION:  Place of Service: Inpatient [address] [facility]  [address]  Place of Service Code: 21  Inpatient Admission Date/Time: 23 12:24 AM  Discharge Date/Time: 3/1/2023  3:45 PM  Admitting Diagnosis Code/Description:  Uterine contractions at greater than 20 weeks of gestation [O47 9]  38 weeks gestation of pregnancy [Z3A 38]  Encounter for full-term uncomplicated delivery [I64]     Mother: Geno Rodriguez 1995 Estimated Date of Delivery: 3/11/23  Delivering clinician: Donny Orantes    OB History as of 3/1/2023        2    Para   2    Term   1       1    AB        Living   2       SAB        IAB        Ectopic        Multiple   0    Live Births   2               [place] Name & MRN:   Information for the patient's :  German Roche [[id number]]      Delivery Information:  Sex: female  Delivered 2023 1:09 AM by Vaginal, Spontaneous; Gestational Age: 36w4d    [place] Measurements:  Weight: 7 lb 1 4 oz (3215 g); Height: 18"    APGAR 1 minute 5 minutes 10 minutes   Totals: 9 9      [place] Birth Information: 24 y o  female MRN: [id number] Unit/Bed#: -01   Birthweight: No birth weight on file  Gestational Age: <None> Delivery Type:    APGARS Totals:        UTILIZATION REVIEW CONTACT:  Manuela Zee Utilization   Network Utilization Review Department  Phone: [phone]  Fax [phone]  Email: [email or web address]  Contact for approvals/pending authorizations, clinical reviews, and discharge       PHYSICIAN ADVISORY SERVICES:  Medical Necessity Denial & Voii-hp-Tvhm Review  Phone: [phone]  Fax: [phone]  Email: [email or web address]

## 2023-03-20 ENCOUNTER — POSTPARTUM VISIT (OUTPATIENT)
Dept: OBGYN CLINIC | Facility: CLINIC | Age: 28
End: 2023-03-20

## 2023-03-20 VITALS
HEIGHT: 60 IN | BODY MASS INDEX: 22.42 KG/M2 | DIASTOLIC BLOOD PRESSURE: 74 MMHG | WEIGHT: 114.2 LBS | SYSTOLIC BLOOD PRESSURE: 120 MMHG | HEART RATE: 75 BPM

## 2023-03-20 RX ORDER — MEDROXYPROGESTERONE ACETATE 150 MG/ML
150 INJECTION, SUSPENSION INTRAMUSCULAR ONCE
Qty: 1 ML | Refills: 3 | Status: SHIPPED | OUTPATIENT
Start: 2023-03-20 | End: 2023-03-20

## 2023-03-20 NOTE — PROGRESS NOTES
POSTPARTUM VISIT    Al Norman presents today for postpartum visit  She had a vaginal delivery on 23  Complications included a history of parathyroidectomy and a history of  delivery  She is breast feeding her infant and reports no issues with such  She desires depo-provera for contraception, first dose was given prior to discharge  She was provided with Marissa Rosario Depression Screening tool and her score was 0  Review of Systems:   -Constitutional: denies issues, denies pain   -Breasts: denies tenderness   -Gynecologic: lochia scant spotting    -Urinary: denies issues urinating   -GI: stools WNL, denies issues    Physical Exam:   -Vitals:   Vitals:    23 1634   BP: 120/74   BP Location: Right arm   Patient Position: Sitting   Cuff Size: Adult   Pulse: 75   Weight: 51 8 kg (114 lb 3 2 oz)   Height: 5' (1 524 m)      -General: A&Ox3, no acute distress noted   -Abdomen: soft, non-tender   -Extremities: nontender, no edema noted   -Breasts:    Deferred    -Pelvic exam:    Deferred     Assessment/Plan:  1  Normal postpartum exam   2  Depression screening negative  3  Last pap smear was done 2021 and result was NILM  Advise return for next annual GYN exam in   Contraception: depo-provera  Refills approved  Scheduled for next dose

## 2023-05-16 ENCOUNTER — CLINICAL SUPPORT (OUTPATIENT)
Dept: OBGYN CLINIC | Facility: CLINIC | Age: 28
End: 2023-05-16

## 2023-05-16 DIAGNOSIS — Z30.42 ENCOUNTER FOR DEPO-PROVERA CONTRACEPTION: Primary | ICD-10-CM

## 2023-05-16 RX ORDER — MEDROXYPROGESTERONE ACETATE 150 MG/ML
150 INJECTION, SUSPENSION INTRAMUSCULAR
Status: SHIPPED | OUTPATIENT
Start: 2023-05-16

## 2023-05-16 RX ADMIN — MEDROXYPROGESTERONE ACETATE 150 MG: 150 INJECTION, SUSPENSION INTRAMUSCULAR at 16:15

## 2023-05-16 NOTE — PROGRESS NOTES
Depo given in Left Deltoid: 5/16/2023    Refills: 3    Previous depo given: 3/1/2023    Annual scheduled: 8/2/2023

## 2023-08-02 ENCOUNTER — ANNUAL EXAM (OUTPATIENT)
Dept: OBGYN CLINIC | Facility: CLINIC | Age: 28
End: 2023-08-02

## 2023-08-02 VITALS
HEIGHT: 60 IN | DIASTOLIC BLOOD PRESSURE: 76 MMHG | WEIGHT: 120 LBS | BODY MASS INDEX: 23.56 KG/M2 | SYSTOLIC BLOOD PRESSURE: 110 MMHG | HEART RATE: 73 BPM

## 2023-08-02 DIAGNOSIS — Z12.39 ENCOUNTER FOR BREAST CANCER SCREENING USING NON-MAMMOGRAM MODALITY: ICD-10-CM

## 2023-08-02 DIAGNOSIS — Z01.419 WOMEN'S ANNUAL ROUTINE GYNECOLOGICAL EXAMINATION: ICD-10-CM

## 2023-08-02 DIAGNOSIS — Z11.3 SCREENING FOR STD (SEXUALLY TRANSMITTED DISEASE): ICD-10-CM

## 2023-08-02 DIAGNOSIS — Z30.42 ENCOUNTER FOR MANAGEMENT AND INJECTION OF DEPO-PROVERA: Primary | ICD-10-CM

## 2023-08-02 DIAGNOSIS — Z12.4 CERVICAL CANCER SCREENING: ICD-10-CM

## 2023-08-02 DIAGNOSIS — L65.9 HAIR LOSS: ICD-10-CM

## 2023-08-02 PROCEDURE — 87491 CHLMYD TRACH DNA AMP PROBE: CPT | Performed by: NURSE PRACTITIONER

## 2023-08-02 PROCEDURE — 99395 PREV VISIT EST AGE 18-39: CPT | Performed by: NURSE PRACTITIONER

## 2023-08-02 PROCEDURE — 87591 N.GONORRHOEAE DNA AMP PROB: CPT | Performed by: NURSE PRACTITIONER

## 2023-08-02 PROCEDURE — 96372 THER/PROPH/DIAG INJ SC/IM: CPT | Performed by: NURSE PRACTITIONER

## 2023-08-02 RX ORDER — DOCUSATE SODIUM 100 MG/1
100 CAPSULE, LIQUID FILLED ORAL 2 TIMES DAILY
Qty: 60 CAPSULE | Refills: 6 | Status: SHIPPED | OUTPATIENT
Start: 2023-08-02

## 2023-08-02 RX ORDER — MEDROXYPROGESTERONE ACETATE 150 MG/ML
150 INJECTION, SUSPENSION INTRAMUSCULAR ONCE
Status: COMPLETED | OUTPATIENT
Start: 2023-08-02 | End: 2023-08-02

## 2023-08-02 RX ORDER — MEDROXYPROGESTERONE ACETATE 150 MG/ML
150 INJECTION, SUSPENSION INTRAMUSCULAR ONCE
Qty: 1 ML | Refills: 3 | Status: SHIPPED | OUTPATIENT
Start: 2023-08-02 | End: 2023-08-02

## 2023-08-02 RX ADMIN — MEDROXYPROGESTERONE ACETATE 150 MG: 150 INJECTION, SUSPENSION INTRAMUSCULAR at 17:20

## 2023-08-02 NOTE — PROGRESS NOTES
[facility] [address] is a 34 y.o. female who presents today for annual GYN exam.  Her last pap smear was performed 2021 and result was NILM. She reports no history of abnormal pap smears in her past.  She has received the HPV vaccine series. She reports menses as absent with depo-provera use. No LMP recorded. Patient has had an injection. She reports that over the past month she has noticed her hair is thinning. Her general medical history has been reviewed and she reports it as follows:    Past Medical History:   Diagnosis Date   • Chronic pain     low back    • Hyperparathyroidism ([address])    • Uses Thai as primary spoken language      Past Surgical History:   Procedure Laterality Date   • [place] PARATHYROIDECTOMY/EXPLORATION PARATHYROIDS Right 06/10/2021    Procedure: Right minimally invasive parathyroidectomy; intraoperative PTH monitoring;  Surgeon: Rubia Gamez MD;  Location: [facility];  Service: Surgical Oncology     OB History        2    Para   2    Term   1       1    AB        Living   2       SAB        IAB        Ectopic        Multiple   0    Live Births   2               Social History     Tobacco Use   • Smoking status: Never   • Smokeless tobacco: Never   Vaping Use   • Vaping Use: Never used   Substance Use Topics   • Alcohol use: Never   • Drug use: Never     Social History     Substance and Sexual Activity   Sexual Activity Yes   • Partners: Male   • Birth control/protection: None, Injection     Cancer-related family history is not on file.     Current Outpatient Medications   Medication Instructions   • acetaminophen (TYLENOL) 650 mg, Oral, Every 6 hours PRN   • docusate sodium (COLACE) 100 mg, Oral, 2 times daily   • famotidine (PEPCID) 20 mg tablet TAKE 1 TABLET BY MOUTH TWICE A DAY   • ibuprofen (MOTRIN) 600 mg, Oral, Every 6 hours PRN   • measles-mumps-rubella (M-M-R II) 0.5 mL, Subcutaneous, Prior to discharge   • medroxyPROGESTERone (DEPO-PROVERA) 150 mg, Intramuscular, Once   • Prenat w/o W-VL-Gtxpajb-FA-DHA (PNV-DHA) 27-0.6-0.4-300 MG CAPS 1 tablet, Oral, Daily   • Prenatal 27-1 MG TABS 1 tablet, Oral, Daily   • valACYclovir (VALTREX) 500 mg, Oral, 2 times daily       Review of Systems:  Review of Systems   Constitutional: Negative. Gastrointestinal: Negative. Genitourinary: Negative. Skin: Negative. Physical Exam:  /76 (BP Location: Right arm)   Pulse 73   Ht 5' (1.524 m)   Wt 54.4 kg (120 lb)   Breastfeeding Yes   BMI 23.44 kg/m²   Physical Exam  Constitutional:       General: She is not in acute distress. Appearance: Normal appearance. Genitourinary:      Vulva and bladder normal.      No lesions in the vagina. No vaginal erythema or ulceration. Right Adnexa: not tender and no mass present. Left Adnexa: not tender and no mass present. No cervical motion tenderness or lesion. Uterus is not enlarged or tender. No uterine mass detected. Breasts:     Right: No mass, nipple discharge or skin change. Left: No mass, nipple discharge or skin change. Cardiovascular:      Rate and Rhythm: Normal rate and regular rhythm. Pulmonary:      Effort: Pulmonary effort is normal.      Breath sounds: Normal breath sounds. Abdominal:      General: Abdomen is flat. Palpations: Abdomen is soft. Musculoskeletal:      Cervical back: Neck supple. Neurological:      Mental Status: She is alert. Skin:     General: Skin is warm and dry. Psychiatric:         Mood and Affect: Mood normal.         Behavior: Behavior normal.   Vitals reviewed. Assessment/Plan:   1. Normal well-woman GYN exam.  2. Cervical cancer screening:  Normal cervical exam.  Pap smear dup to date, due next year. Has received HPV vaccine in the past.   3. STD screening: Orders placed for vaginal GC/CT cultures. 4. Breast cancer screening:  Normal breast exam. Reviewed breast self-awareness.    5. Depression Screening: Patient's depression screening was assessed with a PHQ-2 score of 0. Their PHQ-9 score was 1. Clinically patient does not have depression. No treatment is required. 6. BMI Counseling: Body mass index is 23.44 kg/m². Discussed the patient's BMI with her. The BMI is normal.   7. Discussed that hair loss could be a possible side effect of depo-provera. Will also check TSH. She states she wishes to continue depo even if this is a sdie effect due to desire for reliable contraception. 8. Contraception:  Depo-provera. Dose given today and refills approved. 9. Return to office every 3 months for depo administration and in one year for annual.    Reviewed with patient that test results are available in [employer] immediately, but that they will not necessarily be reviewed by me immediately. Explained that I will review results at my earliest opportunity and contact patient appropriately.

## 2023-08-03 LAB
C TRACH DNA SPEC QL NAA+PROBE: NEGATIVE
N GONORRHOEA DNA SPEC QL NAA+PROBE: NEGATIVE

## 2023-08-04 ENCOUNTER — TELEPHONE (OUTPATIENT)
Dept: OBGYN CLINIC | Facility: CLINIC | Age: 28
End: 2023-08-04

## 2023-08-04 NOTE — TELEPHONE ENCOUNTER
----- Message from Audrey Avalos [place] sent at 8/3/2023  2:27 PM EDT -----  Please let her know the sti culture is negative. Thank you!

## 2023-08-16 ENCOUNTER — LAB (OUTPATIENT)
Dept: LAB | Facility: CLINIC | Age: 28
End: 2023-08-16
Payer: MEDICARE

## 2023-08-16 DIAGNOSIS — L65.9 HAIR LOSS: ICD-10-CM

## 2023-08-16 LAB — TSH SERPL DL<=0.05 MIU/L-ACNC: 0.89 UIU/ML (ref 0.45–4.5)

## 2023-08-16 PROCEDURE — 36415 COLL VENOUS BLD VENIPUNCTURE: CPT

## 2023-08-16 PROCEDURE — 84443 ASSAY THYROID STIM HORMONE: CPT

## 2023-09-01 NOTE — PATIENT INSTRUCTIONS
We are starting your care today  Based on your history and exam you are experiencing muscular tension headaches associated with some neck pain as well  Prescription for muscle relaxant that you may take 1/2 to 1 tablet at bedtime for the next 3-5 nights in a row to help with the muscle tension and pain  Please get the labs completed  Also reviewed importance of staying well hydrated and not skipping any meals as these also contribute to chronic headaches  Will get you scheduled for a follow-up visit to review your labs and medication  Will then further discuss possible referral to physical therapy as this will help also alleviate your pain  Continue routine follow-up with your gyn as scheduled  Also discussed the importance of receiving a flu vaccine every year  Dolor de lalo tensional, cuidados ambulatorios   INFORMACIÓN GENERAL:   Un dolor de lalo tensional  por lo general es causado por tensión muscular en marie lalo o tanja que puede durar 30 minutos hasta varios días  A pesar de que el dolor es incómodo, los carmelo de lalo tensionales por lo general no causan ningún problema nevaeh   Lo siguiente puede causar tensión muscular y desencadenar un dolor de lalo tensional:  · La fatiga ocular o deb jocelyn postura    · Problemas de la mandíbula o dentales julia deb de la articulación temporomandibular, apretar la mandíbula o rechinar los dientes    · Actividades que causan marie lalo se mantenga en deb posición por mucho tiempo    · Pasarse deb comida    · No dormir lo suficiente, apnea de sueño (periodos cortos de falta de respiración)     · Sensibilidad a los alimentos, julia el gluten  Síntomas comunes incluyen los siguientes:   · Dolor sordo y franklin sobre sheila ojos y en la parte trasera de marie lalo    · Dolor de lalo que se empeora a medida que avanza el día     · Dolor que se propaga sobre toda mraie lalo al igual que marie tanja y hombros    · Rigidez de los músculos del tanja y los [FreeTextEntry1] : Impression: Hammertoe deformity left foot. IPK left. Diabetic with peripheral neuropathy.  Treatment: Discussed findings and conditions with the patient. He is to avoid walking barefoot. Shoe gear was discussed with the patient. He is to avoid narrow, shallow shoes to accommodate both the right hallux and 5th hammertoe. He is to moisturize his feet as needed. All lesions were prepped and trimmed via #15 blade to patient's tolerance without incidence. A foam toe separator was dispensed to the patient which he is to wear at all times especially in shoe gear when wearing socks. patient is to moisturize feet as needed. Proper diabetic pedal care was discussed with the patient. The patient is to return as needed to prevent further breakdown of skin and prevent potential risk of ulceration and infection. Any redness, pain, problems or concerns, he is to contact the office. hombros    · Dolor de lalo que se empeora con la milagros brillante o ruidos duros  Busque atención inmediata al presentar los siguientes síntomas:   · Un dolor de lalo repentino que parece diferente o mucho peor     · Dificultad para reinaldo, hablar o al [employer]    · Confusión o deb sensación julia si se fuera a desmayar    · Dolor de [place], fiebre o rigidez en el tanja  El tratamiento para el dolor de lalo tensional  depende de la causa de marie dolor de [place]  Es posible que necesite medicamentos para disminuir marie dolor de [place]  [address] instrucciones  También podría necesitar acudir al proveedor de buddy que se especializa en problemas de espalda, músculos o mandíbula  Lidiar con mis síntomas:   · Lleve un registro de los carmelo de [place]  Ambar [place] cuando [employer] y termino marie dolor de [place]  Incluya los síntomas y lo que estaba haciendo cuando empezó el dolor de [place]  Escriba en el registro lo que usted comió o bebió mauri las 24 horas previas al inicio de marie dolor de [place]  Petey Ham dolor y dónde marc duele  Registre lo que hizo para tratar marie dolor de lalo y si funciono o no  · Aplique calor  en marie lalo mauri 20 a 30 minutos cada 2 horas por los [employer] indicaron  El calor ayuda a disminuir el dolor y los espasmos musculares  Se puede alternar entre el calor y el hielo  · Aplique hielo  en marie lalo por 15 a 20 minutos cada hora según las indicaciones  Use deb compresa de hielo o coloque hielo triturado en deb bolsa plástica y cúbrala con deb toalla  El hielo disminuye el dolor  Prevenir un dolor de lalo tensional:   · Evite la tensión muscular  No se quede en deb posición por [employer]  Use deb almohada diferente si se despierta con el tanja o los músculos de los hombros adoloridos  Busque formas para relajar sheila músculos, julia un masaje o descansar en silencio en deb habitación oscura  · Evite la fatiga ocular    Asegúrese que tiene buena milagros al leer, cocer o realizar [employer]  Acuda a que [employer] años y use gafas según le indicaron  · Consuma dbe variedad de alimentos saludables  Los alimentos saludables incluyen frutas, verduras, panes integrales, productos lácteos descremados, frijoles, carne magra y pescado  Evite alimentos que le producen el dolor de [place]  · Realice actividades físicas con regularidad  El ejercicio ayuda a disminuir el estrés y los carmelo de [place]  Consulte sobre cual es el mejor plan de ejercicios para usted  · No consuma alcohol  El alcohol puede desencadenar un dolor de [place]  El alcohol puede también interferir con los medicamentos que se usan para tratar marie dolor de [place]  · No fume  Si usted fuma, nunca es tarde para dejar de fumar  El humo del tabaco puede desencadenar un dolor de [place]  Solicite más información si necesita ayuda para dejar de fumar  Acuda a marie shannen de control con marie proveedor de buddy según le indicaron:  Lleve marie registro de los carmelo de lalo cuando acuda a la shannen con marie proveedor de buddy  Escriba las preguntas que tenga para que no olvide hacerlas mauri las citas médicas  ACUERDOS SOBRE MARIE CUIDADO:   Usted tiene el derecho de participar en la planificación de marie cuidado  Aprenda todo lo que pueda sobre marie condición y julia darle tratamiento  Discuta con sheila médicos sheila opciones de tratamiento para juntos decidir el cuidado que usted quiere recibir  Usted siempre tiene el derecho a rechazar marie tratamiento  Esta información es sólo para uso en educación  Marie intención no es darle un consejo médico sobre enfermedades o tratamientos  Colsulte con marie Vilma Prave[employer] farmacéutico antes de seguir cualquier régimen médico para saber si es seguro y efectivo para usted  © 2014 [address] is for End User's use only and may not be sold, redistributed or otherwise used for commercial purposes   All illustrations and images included in [facility] are the copyrighted property of A D A M , Inc  or Thor Castillo  180.34

## 2023-10-18 DIAGNOSIS — Z01.419 WOMEN'S ANNUAL ROUTINE GYNECOLOGICAL EXAMINATION: ICD-10-CM

## 2023-10-18 RX ORDER — MEDROXYPROGESTERONE ACETATE 150 MG/ML
150 INJECTION, SUSPENSION INTRAMUSCULAR
Qty: 1 ML | Refills: 3 | Status: SHIPPED | OUTPATIENT
Start: 2023-10-18

## 2023-10-19 ENCOUNTER — CLINICAL SUPPORT (OUTPATIENT)
Dept: OBGYN CLINIC | Facility: CLINIC | Age: 28
End: 2023-10-19

## 2023-10-19 DIAGNOSIS — Z30.42 ENCOUNTER FOR MANAGEMENT AND INJECTION OF DEPO-PROVERA: Primary | ICD-10-CM

## 2023-10-19 RX ADMIN — MEDROXYPROGESTERONE ACETATE 150 MG: 150 INJECTION, SUSPENSION INTRAMUSCULAR at 10:15

## 2023-10-19 NOTE — PROGRESS NOTES
Depo-Provera     [x]   Patient provided box yes   3 Refills remain  Refills submitted no  Annual Due: 8/3/24  Last Depo date: 8/2/23  Side effects: no  HCG: no    Given by: K. Reyes   Site: LD    Calcium supplement daily teaching  Condoms for 2 weeks following first injection dose.

## 2023-11-09 ENCOUNTER — OFFICE VISIT (OUTPATIENT)
Dept: ENDOCRINOLOGY | Facility: CLINIC | Age: 28
End: 2023-11-09

## 2023-11-09 VITALS
SYSTOLIC BLOOD PRESSURE: 102 MMHG | BODY MASS INDEX: 24.03 KG/M2 | HEIGHT: 60 IN | WEIGHT: 122.4 LBS | DIASTOLIC BLOOD PRESSURE: 68 MMHG

## 2023-11-09 DIAGNOSIS — E89.2 STATUS POST PARATHYROIDECTOMY (HCC): Primary | ICD-10-CM

## 2023-11-09 DIAGNOSIS — R53.83 OTHER FATIGUE: ICD-10-CM

## 2023-11-09 PROBLEM — Z28.39 RUBELLA NON-IMMUNE STATUS, ANTEPARTUM: Status: RESOLVED | Noted: 2022-08-23 | Resolved: 2023-11-09

## 2023-11-09 PROBLEM — O09.899 RUBELLA NON-IMMUNE STATUS, ANTEPARTUM: Status: RESOLVED | Noted: 2022-08-23 | Resolved: 2023-11-09

## 2023-11-09 PROBLEM — Z71.85 VACCINE COUNSELING: Status: RESOLVED | Noted: 2022-11-18 | Resolved: 2023-11-09

## 2023-11-09 PROCEDURE — 99213 OFFICE O/P EST LOW 20 MIN: CPT | Performed by: INTERNAL MEDICINE

## 2023-11-09 NOTE — PROGRESS NOTES
Chief Complaint   Patient presents with    hx of hyperparathyroidis    indetermiate genetic testing      Referring Provider  No referring provider defined for this encounter. History of Present Illness:   Ag Salas is a 34 y.o. 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 Her  accompanies her for support. She was last seen in Aug 2022 during her last pregnancy. She is primarily [id number] [address] [address] speaking and  #[id number] is used to assist today. 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 Gunjan Schumacher. Her calcium and PTH normalized. He referred her for genetic counseling given her age of onset and possible MEN1. MEN1 testing is indeterminate (see below). There is a variant of unknown significance     Currently, she is a self pay and has finanacial assistance in process. She is currently breast feeding and intends to do this until 1y after delivery. (Spring 2024). She no longer takes any medications for GERD and denies symptoms of hypoglycemia.      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    Elevated blood pressure reading without diagnosis of hypertension      Past Medical History:   Diagnosis Date    Chronic pain     low back     Hyperparathyroidism ([address])     Rubella non-immune status, antepartum 2022    Rubella equivocal  Will require MMR after delivery     (spontaneous vaginal delivery) 2023    Uses Korean as primary spoken language     Vaccine counseling 2022      Past Surgical History: Procedure Laterality Date    IN PARATHYROIDECTOMY/EXPLORATION PARATHYROIDS Right 06/10/2021    Procedure: Right minimally invasive parathyroidectomy; intraoperative PTH monitoring;  Surgeon: Zoe Land MD;  Location: [facility];  Service: Surgical Oncology      Family History   Problem Relation Age of Onset    No Known Problems Mother     No Known Problems Father     No Known Problems Sister     No Known Problems Sister     No Known Problems Sister     No Known Problems Brother     Other Maternal Grandmother         Passed away from some type of intestine disease (unspecified)    Cirrhosis Maternal Grandfather     Alcohol abuse Maternal Grandfather     No Known Problems Paternal Grandmother     No Known Problems Paternal Grandfather     No Known Problems Son      Social History     Tobacco Use    Smoking status: Never    Smokeless tobacco: Never   Substance Use Topics    Alcohol use: Never     No Known Allergies      Current Outpatient Medications:     famotidine (PEPCID) 20 mg tablet, TAKE 1 TABLET BY MOUTH TWICE A DAY (Patient not taking: Reported on 8/2/2023), Disp: 180 tablet, Rfl: 2    medroxyPROGESTERone (DEPO-PROVERA) 150 mg/mL injection, Inject 1 mL (150 mg total) into a muscle every 3 (three) months, Disp: 1 mL, Rfl: 3    Prenatal 27-1 MG TABS, Take 1 tablet by mouth in the morning, Disp: 30 tablet, Rfl: 4    valACYclovir (VALTREX) 500 mg tablet, Take 1 tablet (500 mg total) by mouth 2 (two) times a day, Disp: 60 tablet, Rfl: 0    Current Facility-Administered Medications:     medroxyPROGESTERone (DEPO-PROVERA) IM injection 150 mg, 150 mg, Intramuscular, Q3 Months, Jazmín Ospina DO, 150 mg at 10/19/23 1015  Review of Systems    Physical Exam:  Body mass index is 23.9 kg/m².   /68 (BP Location: Left arm, Patient Position: Sitting, Cuff Size: Adult)   Ht 5' (1.524 m)   Wt 55.5 kg (122 lb 6.4 oz)   BMI 23.90 kg/m²    Wt Readings from Last 3 Encounters:   11/09/23 55.5 kg (122 lb 6.4 oz) 08/02/23 54.4 kg (120 lb)   03/20/23 51.8 kg (114 lb 3.2 oz)         Physical Exam   Gen: appears well-developed and well-nourished. No apparent distress. Head: Normocephalic and atraumatic. Eyes: no stare or proptosis, no periorbital edema  E/N/M nl facies, hearing grossly intact  Neck: range of motion. Pulmonary/Chest: breathing  comfortably, no accessory muscle use, effort normal.   Musculoskeletal: moves all 4 extremities, gait nl  Neurological: alert and oriented to person, place, and time. No upper ext tremor appreciated  Skin: does not appear diaphoretic, no facial plethora  Psychiatric: normal mood and affect; behavior is normal; no gross lapses in memory, answer questions appropriately      DATA:  Labs:         Radiology    Genetic results:  Result: 2 Variants of uncertain significance     Variant 1  MEN1 c.1117C>A ([id number]); heterozygous; uncertain significance      Variant 2  MSH2 c.1511G>A ([id number]); heterozygous; uncertain significance      Assessment: A variant of uncertain significance (VUS) means that a change was identified in a specific gene but it cannot be determined whether the variant is associated with an increased risk of cancer or is a harmless genetic change. It is possible that the variant was seen in only a handful of individuals, or there may be conflicting or incomplete information in the medical literature about the variant and its association with hereditary cancer. The significance of the MEN1 and MSH2 variants is currently not known and therefore this test result cannot be used to help determine Bryce Webster cancer risks. Impression:  1. Status post parathyroidectomy ([address])    2. Other fatigue           Plan:    Bryce Webster was seen today for hx of hyperparathyroidis and indetermiate genetic testing. Diagnoses and all orders for this visit:    Status post parathyroidectomy [facility])  -     Comprehensive metabolic panel Lab Collect;  Future    Other fatigue  - Comprehensive metabolic panel Lab Collect; Future      Hx of Parathyroid, varient of unknown significance for MEN1 :   Her insruance status does have out of pocket costs. We will monitor yearly, but will start with a  CMP for glucose and calcium. We cannot check prl until after she is done brast feeding. We will assess next year if her insurance would cover CgA, c peptide, or gastrin. Recommend yearly vitis for now     Discussed with the patient and all questioned fully answered. She will call me if any problems arise.         Rupa Wade MD

## 2023-11-16 ENCOUNTER — LAB (OUTPATIENT)
Dept: LAB | Facility: CLINIC | Age: 28
End: 2023-11-16

## 2023-11-16 DIAGNOSIS — E89.2 STATUS POST PARATHYROIDECTOMY (HCC): ICD-10-CM

## 2023-11-16 DIAGNOSIS — R53.83 OTHER FATIGUE: ICD-10-CM

## 2023-11-16 LAB
ALBUMIN SERPL BCP-MCNC: 4.4 G/DL (ref 3.5–5)
ALP SERPL-CCNC: 61 U/L (ref 34–104)
ALT SERPL W P-5'-P-CCNC: 28 U/L (ref 7–52)
ANION GAP SERPL CALCULATED.3IONS-SCNC: 8 MMOL/L
AST SERPL W P-5'-P-CCNC: 18 U/L (ref 13–39)
BILIRUB SERPL-MCNC: 0.83 MG/DL (ref 0.2–1)
BUN SERPL-MCNC: 14 MG/DL (ref 5–25)
CALCIUM SERPL-MCNC: 9.7 MG/DL (ref 8.4–10.2)
CHLORIDE SERPL-SCNC: 107 MMOL/L (ref 96–108)
CO2 SERPL-SCNC: 25 MMOL/L (ref 21–32)
CREAT SERPL-MCNC: 0.57 MG/DL (ref 0.6–1.3)
GFR SERPL CREATININE-BSD FRML MDRD: 128 ML/MIN/1.73SQ M
GLUCOSE P FAST SERPL-MCNC: 86 MG/DL (ref 65–99)
POTASSIUM SERPL-SCNC: 3.9 MMOL/L (ref 3.5–5.3)
PROT SERPL-MCNC: 7.4 G/DL (ref 6.4–8.4)
SODIUM SERPL-SCNC: 140 MMOL/L (ref 135–147)

## 2023-11-16 PROCEDURE — 36415 COLL VENOUS BLD VENIPUNCTURE: CPT

## 2023-11-16 PROCEDURE — 80053 COMPREHEN METABOLIC PANEL: CPT

## 2023-11-17 NOTE — RESULT ENCOUNTER NOTE
Please let her know that the metabolic panel is back includes her calcium and blood sugars which are all normal. That you.

## 2023-12-13 ENCOUNTER — TELEPHONE (OUTPATIENT)
Dept: ENDOCRINOLOGY | Facility: CLINIC | Age: 28
End: 2023-12-13

## 2023-12-13 NOTE — TELEPHONE ENCOUNTER
----- Message from Jean Marie Martinez MD sent at 11/17/2023  8:59 AM EST -----  Please let her know that the metabolic panel is back includes her calcium and blood sugars which are all normal. That you.

## 2023-12-14 ENCOUNTER — OFFICE VISIT (OUTPATIENT)
Dept: URGENT CARE | Facility: CLINIC | Age: 28
End: 2023-12-14
Payer: COMMERCIAL

## 2023-12-14 VITALS
BODY MASS INDEX: 24.03 KG/M2 | OXYGEN SATURATION: 97 % | HEIGHT: 60 IN | DIASTOLIC BLOOD PRESSURE: 85 MMHG | HEART RATE: 93 BPM | RESPIRATION RATE: 18 BRPM | SYSTOLIC BLOOD PRESSURE: 124 MMHG | WEIGHT: 122.4 LBS | TEMPERATURE: 97.7 F

## 2023-12-14 DIAGNOSIS — R10.10 PAIN OF UPPER ABDOMEN: Primary | ICD-10-CM

## 2023-12-14 DIAGNOSIS — R11.0 NAUSEA: ICD-10-CM

## 2023-12-14 PROCEDURE — 99213 OFFICE O/P EST LOW 20 MIN: CPT

## 2023-12-14 RX ORDER — ONDANSETRON 4 MG/1
4 TABLET, FILM COATED ORAL EVERY 8 HOURS PRN
Qty: 20 TABLET | Refills: 0 | Status: SHIPPED | OUTPATIENT
Start: 2023-12-14

## 2023-12-15 NOTE — PROGRESS NOTES
[facility]        NAME: Pedro Omer is a 34 y.o. female  : 1995    MRN: [id number]  DATE: 2023  TIME: 8:04 PM    Assessment and Plan   Pain of upper abdomen [R10.10]  1. Pain of upper abdomen        2. Nausea  ondansetron (ZOFRAN) 4 mg tablet          Discussed with patient this could be exacerbation of GERD and recommend she try taking famotidine, which she does still have at home. Advised patient to seek further evaluation in the ED for persistent abdominal pain and nausea OR if she develops fevers or starts vomiting, she acknowledges. Patient Instructions     Zofran prescribed - take as needed as directed. Start with drinking clear liquids (i.e. Gatorade, Powerade, Pedialyte, etc but avoid red liquids). You may advance to bland diet 6-8 hrs after last vomiting (ie. BRAT - bananas, rice, applesauce, toast) as tolerated. Recommend avoiding milk products, spicy, greasy foods, and citrus products over the next 1-2 weeks. Advance diet as tolerated. Follow up with your PCP in 1-2 days if symptoms persist.    Go to the ER if symptoms are worsening. Chief Complaint     Chief Complaint   Patient presents with    Nausea     Pt says symptoms started this morning at 3 a.m. [facility] Speaker Pt says that this morning she started with a stomach ache. She has had history of constipation, and so she took a laxative to use the restroom. After having diarrhea, her stomach ache continued. Throughout the day she became nauseous, but has not vomited. PT states that she has had a loss appetite, head ache , and some body aches since this morning. History of Present Illness       This is a 32-year-old female presenting with intermittent nausea that started ~3 am this morning.  Patient states she woke up with generalized abdominal discomfort and thought it could be constipation, even though last normal BM yesterday, as she has had problems with this in the past. Made a "home remedy" laxative and had a few episodes of loose stools later in the morning. This did not help her abdominal pain and she also developed nausea without vomiting shortly after. Decreased appetite. Eating or drinking does not make the pain worse. Has been able to keep down some fluids. Also experienced body aches and headaches. No known fevers. Denies chance of pregnancy. Chart review shows history of cholestasis during first pregnancy 7-8 years ago. Patient states she does not remember this and denies known gall bladder issues since. Also with history of GERD. Previously took 20mg famotidine BID for management. Review of Systems   Review of Systems   Constitutional:  Negative for chills and fever. HENT:  Negative for congestion and sore throat. Respiratory:  Negative for chest tightness and shortness of breath. Cardiovascular:  Negative for chest pain and palpitations. Gastrointestinal:  Positive for abdominal pain, constipation, diarrhea and nausea. Negative for blood in stool and vomiting. Genitourinary:  Negative for dysuria, frequency, hematuria and urgency. Musculoskeletal:  Positive for myalgias. Negative for back pain and neck pain. Skin:  Negative for pallor and rash. Neurological:  Positive for headaches. Negative for dizziness and light-headedness.          Current Medications       Current Outpatient Medications:     ondansetron (ZOFRAN) 4 mg tablet, Take 1 tablet (4 mg total) by mouth every 8 (eight) hours as needed for nausea or vomiting, Disp: 20 tablet, Rfl: 0    famotidine (PEPCID) 20 mg tablet, TAKE 1 TABLET BY MOUTH TWICE A DAY (Patient not taking: Reported on 8/2/2023), Disp: 180 tablet, Rfl: 2    medroxyPROGESTERone (DEPO-PROVERA) 150 mg/mL injection, Inject 1 mL (150 mg total) into a muscle every 3 (three) months, Disp: 1 mL, Rfl: 3    Prenatal 27-1 MG TABS, Take 1 tablet by mouth in the morning, Disp: 30 tablet, Rfl: 4    valACYclovir (VALTREX) 500 mg tablet, Take 1 tablet (500 mg total) by mouth 2 (two) times a day, Disp: 60 tablet, Rfl: 0    Current Facility-Administered Medications:     medroxyPROGESTERone (DEPO-PROVERA) IM injection 150 mg, 150 mg, Intramuscular, Q3 Months, Jazmín ERICKSON Bhavesh , 150 mg at 10/19/23 1015    Current Allergies     Allergies as of 2023    (No Known Allergies)            The following portions of the patient's history were reviewed and updated as appropriate: allergies, current medications, past family history, past medical history, past social history, past surgical history and problem list.     Past Medical History:   Diagnosis Date    Chronic pain     low back     Hyperparathyroidism ([address])     Rubella non-immune status, antepartum 2022    Rubella equivocal  Will require MMR after delivery     (spontaneous vaginal delivery) 2023    Uses Kinyarwanda as primary spoken language     Vaccine counseling 2022       Past Surgical History:   Procedure Laterality Date    SD PARATHYROIDECTOMY/EXPLORATION PARATHYROIDS Right 06/10/2021    Procedure: Right minimally invasive parathyroidectomy; intraoperative PTH monitoring;  Surgeon: Savita Leach MD;  Location: [facility];  Service: Surgical Oncology       Family History   Problem Relation Age of Onset    No Known Problems Mother     No Known Problems Father     No Known Problems Sister     No Known Problems Sister     No Known Problems Sister     No Known Problems Brother     Other Maternal Grandmother         Passed away from some type of intestine disease (unspecified)    Cirrhosis Maternal Grandfather     Alcohol abuse Maternal Grandfather     No Known Problems Paternal Grandmother     No Known Problems Paternal Grandfather     No Known Problems Son          Medications have been verified.         Objective   /85   Pulse 93   Temp 97.7 °F (36.5 °C)   Resp 18   Ht 5' (1.524 m)   Wt 55.5 kg (122 lb 6.4 oz)   SpO2 97%   BMI 23.90 kg/m²        Physical Exam Physical Exam  Vitals and nursing note reviewed. Constitutional:       General: She is not in acute distress. Appearance: She is not ill-appearing or diaphoretic. HENT:      Head: Normocephalic. Right Ear: Tympanic membrane, ear canal and external ear normal.      Left Ear: Tympanic membrane, ear canal and external ear normal.      Nose: Nose normal.      Mouth/Throat:      Mouth: Mucous membranes are moist.      Pharynx: Oropharynx is clear. Eyes:      Conjunctiva/sclera: Conjunctivae normal.      Pupils: Pupils are equal, round, and reactive to light. Cardiovascular:      Rate and Rhythm: Normal rate and regular rhythm. Heart sounds: Normal heart sounds. Pulmonary:      Effort: Pulmonary effort is normal.      Breath sounds: Normal breath sounds. Abdominal:      General: Bowel sounds are normal. There is no distension. Palpations: Abdomen is soft. Tenderness: There is abdominal tenderness in the epigastric area. Musculoskeletal:         General: Normal range of motion. Cervical back: Normal range of motion and neck supple. Skin:     General: Skin is warm and dry. Capillary Refill: Capillary refill takes less than 2 seconds. Neurological:      Mental Status: She is alert and oriented to person, place, and time.

## 2023-12-15 NOTE — PATIENT INSTRUCTIONS
Zofran prescribed - take as needed as directed. Start with drinking clear liquids (i.e. Gatorade, Powerade, Pedialyte, etc but avoid red liquids). You may advance to bland diet 6-8 hrs after last vomiting (ie. BRAT - bananas, rice, applesauce, toast) as tolerated. Recommend avoiding milk products, spicy, greasy foods, and citrus products over the next 1-2 weeks. Advance diet as tolerated. Follow up with your PCP in 1-2 days if symptoms persist.    Go to the ER if symptoms are worsening.

## 2024-01-02 ENCOUNTER — HOSPITAL ENCOUNTER (EMERGENCY)
Facility: HOSPITAL | Age: 29
Discharge: HOME | End: 2024-01-02
Attending: EMERGENCY MEDICINE

## 2024-01-02 VITALS
DIASTOLIC BLOOD PRESSURE: 97 MMHG | OXYGEN SATURATION: 97 % | RESPIRATION RATE: 20 BRPM | SYSTOLIC BLOOD PRESSURE: 130 MMHG | TEMPERATURE: 98.9 F | HEART RATE: 104 BPM

## 2024-01-02 DIAGNOSIS — R68.89 FLU-LIKE SYMPTOMS: Primary | ICD-10-CM

## 2024-01-02 LAB
FLUAV RNA RESP QL NAA+PROBE: NEGATIVE
FLUBV RNA RESP QL NAA+PROBE: NEGATIVE
RSV RNA RESP QL NAA+PROBE: NEGATIVE
SARS-COV-2 RNA RESP QL NAA+PROBE: POSITIVE

## 2024-01-02 PROCEDURE — 0241U HB NFCT DS VIR RESP RNA 4 TRGT: CPT

## 2024-01-02 PROCEDURE — 99284 EMERGENCY DEPT VISIT MOD MDM: CPT | Performed by: EMERGENCY MEDICINE

## 2024-01-02 PROCEDURE — 99283 EMERGENCY DEPT VISIT LOW MDM: CPT

## 2024-01-02 RX ORDER — SENNOSIDES 8.6 MG
650 CAPSULE ORAL EVERY 8 HOURS PRN
Qty: 30 TABLET | Refills: 0 | Status: SHIPPED | OUTPATIENT
Start: 2024-01-02

## 2024-01-02 RX ORDER — ACETAMINOPHEN 325 MG/1
650 TABLET ORAL ONCE
Status: COMPLETED | OUTPATIENT
Start: 2024-01-02 | End: 2024-01-02

## 2024-01-02 RX ORDER — IBUPROFEN 600 MG/1
600 TABLET ORAL ONCE
Status: COMPLETED | OUTPATIENT
Start: 2024-01-02 | End: 2024-01-02

## 2024-01-02 RX ORDER — OXYMETAZOLINE HYDROCHLORIDE 0.05 G/100ML
2 SPRAY NASAL ONCE
Status: COMPLETED | OUTPATIENT
Start: 2024-01-02 | End: 2024-01-02

## 2024-01-02 RX ADMIN — ACETAMINOPHEN 650 MG: 325 TABLET, FILM COATED ORAL at 03:51

## 2024-01-02 RX ADMIN — OXYMETAZOLINE HYDROCHLORIDE 2 SPRAY: 0.05 SPRAY NASAL at 03:51

## 2024-01-02 RX ADMIN — IBUPROFEN 600 MG: 600 TABLET ORAL at 03:51

## 2024-01-02 NOTE — ED ATTENDING ATTESTATION
1/2/2024  I, Quynh Mcdaniel MD, saw and evaluated the patient. I have discussed the patient with the resident/non-physician practitioner and agree with the resident's/non-physician practitioner's findings, Plan of Care, and MDM as documented in the resident's/non-physician practitioner's note, except where noted. All available labs and Radiology studies were reviewed.  I was present for key portions of any procedure(s) performed by the resident/non-physician practitioner and I was immediately available to provide assistance.       At this point I agree with the current assessment done in the Emergency Department.  I have conducted an independent evaluation of this patient a history and physical is as follows:    ED Course         Critical Care Time  Procedures    27 yo female with uri symptoms for a day then developed chills and fever today. Pt with bodyaches, no vomiting, no abdominal pain, having cough.  Pt with no urinary complaints, no fever.   Vss, afebrile, lungs cta, rrr, abdomen soft nontender, no pharyngeal erythema.  No relief with tylenol.  Viral swab, symptom control.

## 2024-01-02 NOTE — DISCHARGE INSTRUCTIONS
Thank you for coming to the ER today. Please follow up with your primary care doctor in 1-2 days to be re-evaluated. If at any point you experience any new or worsening symptoms do not hesitate to come back to the hospital to be evaluated. Symptoms you should look out for include shortness of breath, increased fevers, or any other new, worsening or concerning symptom. Thank you and hope you have a great rest of your day.

## 2024-01-02 NOTE — ED PROVIDER NOTES
History  Chief Complaint   Patient presents with    Flu Symptoms     Started with cold like symptoms yesterday, tonight developed chills and states felt feverish tonight      Patient is a 28-year-old female presenting to the emergency department for evaluation of flulike symptoms over the past 2 days.  She notes subjective fevers and chills sore throat nasal congestion and ear pain.  Denies any dizziness tinnitus vision change neck pain back pain numbness or tingling in any of her extremities she denies any chest pain or shortness of breath.  Patient notes that yesterday she took a baby Tylenol which did not give her significant relief.        Prior to Admission Medications   Prescriptions Last Dose Informant Patient Reported? Taking?   Prenatal 27-1 MG TABS   No No   Sig: Take 1 tablet by mouth in the morning   famotidine (PEPCID) 20 mg tablet   No No   Sig: TAKE 1 TABLET BY MOUTH TWICE A DAY   Patient not taking: Reported on 2023   medroxyPROGESTERone (DEPO-PROVERA) 150 mg/mL injection   No No   Sig: Inject 1 mL (150 mg total) into a muscle every 3 (three) months   ondansetron (ZOFRAN) 4 mg tablet   No No   Sig: Take 1 tablet (4 mg total) by mouth every 8 (eight) hours as needed for nausea or vomiting   valACYclovir (VALTREX) 500 mg tablet   No No   Sig: Take 1 tablet (500 mg total) by mouth 2 (two) times a day      Facility-Administered Medications Last Administration Doses Remaining   medroxyPROGESTERone (DEPO-PROVERA) IM injection 150 mg 10/19/2023 10:15 AM           Past Medical History:   Diagnosis Date    Chronic pain     low back     Hyperparathyroidism (HCC)     Rubella non-immune status, antepartum 2022    Rubella equivocal  Will require MMR after delivery     (spontaneous vaginal delivery) 2023    Uses Albanian as primary spoken language     Vaccine counseling 2022       Past Surgical History:   Procedure Laterality Date    [place] PARATHYROIDECTOMY/EXPLORATION PARATHYROIDS Right  06/10/2021    Procedure: Right minimally invasive parathyroidectomy; intraoperative PTH monitoring;  Surgeon: Jose Elias Davalos MD;  [facility];  Service: Surgical Oncology       Family History   Problem Relation Age of Onset    No Known Problems Mother     No Known Problems Father     No Known Problems Sister     No Known Problems Sister     No Known Problems Sister     No Known Problems Brother     Other Maternal Grandmother         Passed away from some type of intestine disease (unspecified)    Cirrhosis Maternal Grandfather     Alcohol abuse Maternal Grandfather     No Known Problems Paternal Grandmother     No Known Problems Paternal Grandfather     No Known Problems Son      I have reviewed and agree with the history as documented.    E-Cigarette/Vaping    E-Cigarette Use Never User      E-Cigarette/Vaping Substances    Nicotine No     THC No     CBD No     Flavoring No      Social History     Tobacco Use    Smoking status: Never    Smokeless tobacco: Never   Vaping Use    Vaping status: Never Used   Substance Use Topics    Alcohol use: Never    Drug use: Never        Review of Systems   Constitutional:  Positive for activity change, fatigue and fever. Negative for chills.   HENT:  Positive for congestion, ear pain, sinus pressure and sore throat. Negative for trouble swallowing.    Eyes:  Negative for pain and visual disturbance.   Respiratory:  Negative for cough and shortness of breath.    Cardiovascular:  Negative for chest pain and palpitations.   Gastrointestinal:  Negative for abdominal pain, constipation, diarrhea, nausea and vomiting.   Genitourinary:  Negative for dysuria and hematuria.   Musculoskeletal:  Negative for arthralgias, back pain and neck pain.   Skin:  Negative for color change and rash.   Neurological:  Negative for dizziness, seizures, syncope, weakness, light-headedness and headaches.   Psychiatric/Behavioral:  Negative for agitation and behavioral problems.    All other  systems reviewed and are negative.      Physical Exam  ED Triage Vitals   Temperature Pulse Respirations Blood Pressure SpO2   01/02/24 0337 01/02/24 0332 01/02/24 0332 01/02/24 0332 01/02/24 0337   98.9 °F (37.2 °C) (!) 110 20 130/97 97 %      Temp Source Heart Rate Source Patient Position - Orthostatic VS BP Location FiO2 (%)   01/02/24 0337 01/02/24 0332 -- 01/02/24 0332 --   Oral Monitor  Left arm       Pain Score       --                    Orthostatic Vital Signs  Vitals:    01/02/24 0332 01/02/24 0337   BP: 130/97    Pulse: (!) 110 104       Physical Exam  Vitals and nursing note reviewed.   Constitutional:       General: She is not in acute distress.     Appearance: She is well-developed.   HENT:      Head: Normocephalic and atraumatic.      Right Ear: Ear canal and external ear normal.      Left Ear: Ear canal and external ear normal.      Ears:      Comments: Bilateral TM effusions     Nose: Congestion present.      Mouth/Throat:      Pharynx: Posterior oropharyngeal erythema present.   Eyes:      Extraocular Movements: Extraocular movements intact.      Conjunctiva/sclera: Conjunctivae normal.   Cardiovascular:      Rate and Rhythm: Normal rate and regular rhythm.      Pulses: Normal pulses.      Heart sounds: No murmur heard.  Pulmonary:      Effort: Pulmonary effort is normal. No respiratory distress.      Breath sounds: Normal breath sounds.   Abdominal:      General: Abdomen is flat.      Palpations: Abdomen is soft.      Tenderness: There is no abdominal tenderness.   Musculoskeletal:         General: No swelling.      Cervical back: Normal range of motion and neck supple.   Skin:     General: Skin is warm and dry.      Capillary Refill: Capillary refill takes less than 2 seconds.   Neurological:      General: No focal deficit present.      Mental Status: She is alert and oriented to person, place, and time.   Psychiatric:         Mood and Affect: Mood normal.         Behavior: Behavior normal.          ED Medications  Medications   oxymetazoline (AFRIN) 0.05 % nasal spray 2 spray (has no administration in time range)   acetaminophen (TYLENOL) tablet 650 mg (has no administration in time range)   ibuprofen (MOTRIN) tablet 600 mg (has no administration in time range)       Diagnostic Studies  Results Reviewed       Procedure Component Value Units Date/Time    FLU/RSV/COVID - if FLU/RSV clinically relevant [[id number]] Collected: 01/02/24 0344    Lab Status: In process Specimen: Nares from Nose Updated: 01/02/24 0349                   No orders to display         Procedures  Procedures      ED Course  ED Course as of 01/02/24 0350   Tue Jan 02, 2024 0338 Blood Pressure: 130/97   0338 Temperature: 98.9 °F (37.2 °C)   0338 Temp Source: Oral   0338 Pulse: 104   0338 SpO2: 97 %                             SBIRT 20yo+      Flowsheet Row Most Recent Value   Initial Alcohol Screen: US AUDIT-C     1. How often do you have a drink containing alcohol? 0 Filed at: 01/02/2024 0337   2. How many drinks containing alcohol do you have on a typical day you are drinking?  0 Filed at: 01/02/2024 0337   3b. FEMALE Any Age, or MALE 65+: How often do you have 4 or more drinks on one occassion? 0 Filed at: 01/02/2024 0337   Audit-C Score 0 Filed at: 01/02/2024 0337   BRYCE: How many times in the past year have you...    Used an illegal drug or used a prescription medication for non-medical reasons? Never Filed at: 01/02/2024 0337                  Medical Decision Making  Symptoms consistent with upper respiratory infection, likely viral in etiology. Clinically well hydrated on arrival. No signs of respiratory distress. Discussed nasal clearance. May use saline spray. Tylenol and motrin recommended as needed for fever. Encourage fluids.  Afrin given.  Advised to use this only for 3 days as it may cause a rebound reaction if you use it longer.  Advised Tylenol and Motrin for symptomatic relief.  PCP follow-up advised as well as  return precautions given.  Work note was offered patient declined.  Patient stable for discharge.            Risk  OTC drugs.  Prescription drug management.          Disposition  Final diagnoses:   Flu-like symptoms     Time reflects when diagnosis was documented in both MDM as applicable and the Disposition within this note       Time User Action Codes Description Comment    1/2/2024  3:47 AM Palladino, Brianna Add [R68.89] Flu-like symptoms           ED Disposition       ED Disposition   Discharge    Condition   Stable    Date/Time   Tue Jan 2, 2024 4157    Comment   Marika Schneider discharge to home/self care.                   Follow-up Information       Follow up With Specialties Details Why Contact Info Additional Information    [facility] Emergency Department Emergency Medicine Go to  As needed, If symptoms worsen [address] [place] [id number] [phone] [facility] Emergency Department, [address] [place] [address], [id number] [phone]    [facility] Family Medicine Schedule an appointment as soon as possible for a visit  for follow up [address] [place] [id number] [phone] [facility], [address] [place] [address], [place], [place], [address] [phone]            Patient's Medications   Discharge Prescriptions    ACETAMINOPHEN (TYLENOL) 650 MG CR TABLET    Take 1 tablet (650 mg total) by mouth every 8 (eight) hours as needed for mild pain       Start Date: 1/2/2024  End Date: --       Order Dose: 650 mg       Quantity: 30 tablet    Refills: 0     No discharge procedures on file.    PDMP Review         Value Time User    PDMP Reviewed  Yes 4/12/2021  9:01 AM Jose Elias Davalos MD             ED Provider  Attending physically available and evaluated Marika Schneider. I managed the patient along with the ED  Attending.    Electronically Signed by           Annette Maria Palladino,   01/02/24 0351

## 2024-01-15 ENCOUNTER — CLINICAL SUPPORT (OUTPATIENT)
Dept: OBGYN CLINIC | Facility: CLINIC | Age: 29
End: 2024-01-15

## 2024-01-15 DIAGNOSIS — Z30.42 ENCOUNTER FOR MANAGEMENT AND INJECTION OF DEPO-PROVERA: Primary | ICD-10-CM

## 2024-01-15 PROCEDURE — 96372 THER/PROPH/DIAG INJ SC/IM: CPT

## 2024-01-15 RX ADMIN — MEDROXYPROGESTERONE ACETATE 150 MG: 150 INJECTION, SUSPENSION INTRAMUSCULAR at 09:35

## 2024-01-15 NOTE — PROGRESS NOTES
Depo-Provera                                           [x]   Patient provided box yes   2 Refills remain  Refills submitted no  Annual Due: 8/3/24  Last Depo date: 10/19/2023  Side effects: no  HCG: no  Given by: Belgica Mccabe      Site: Left Deltoid     Calcium supplement daily teaching  Condoms for 2 weeks following first injection dose.

## 2024-01-17 RX ORDER — MEDROXYPROGESTERONE ACETATE 150 MG/ML
150 INJECTION, SUSPENSION INTRAMUSCULAR ONCE
Status: COMPLETED | OUTPATIENT
Start: 2024-01-17 | End: 2024-01-15

## 2024-04-03 ENCOUNTER — CLINICAL SUPPORT (OUTPATIENT)
Dept: OBGYN CLINIC | Facility: CLINIC | Age: 29
End: 2024-04-03

## 2024-04-03 DIAGNOSIS — Z30.42 ENCOUNTER FOR MANAGEMENT AND INJECTION OF DEPO-PROVERA: Primary | ICD-10-CM

## 2024-04-03 PROCEDURE — 96372 THER/PROPH/DIAG INJ SC/IM: CPT

## 2024-04-03 RX ADMIN — MEDROXYPROGESTERONE ACETATE 150 MG: 150 INJECTION, SUSPENSION INTRAMUSCULAR at 09:38

## 2024-04-03 NOTE — PROGRESS NOTES
Depo-Provera     [x]   Patient provided box yes   1 Refills remain  Refills submitted no  Last  Annual Date / Birth control check : 8/2/23  Last Depo date: 1/15/24  Side effects: no  HCG: no  Given by: Brittany Gilmore  Site: Left deltoid    Calcium supplement daily teaching  Condoms for 2 weeks following first injection dose.

## 2024-06-19 ENCOUNTER — CLINICAL SUPPORT (OUTPATIENT)
Dept: OBGYN CLINIC | Facility: CLINIC | Age: 29
End: 2024-06-19

## 2024-06-19 DIAGNOSIS — Z01.419 WOMEN'S ANNUAL ROUTINE GYNECOLOGICAL EXAMINATION: ICD-10-CM

## 2024-06-19 DIAGNOSIS — Z30.42 ENCOUNTER FOR MANAGEMENT AND INJECTION OF DEPO-PROVERA: Primary | ICD-10-CM

## 2024-06-19 PROCEDURE — 96372 THER/PROPH/DIAG INJ SC/IM: CPT

## 2024-06-19 RX ORDER — MEDROXYPROGESTERONE ACETATE 150 MG/ML
150 INJECTION, SUSPENSION INTRAMUSCULAR
Qty: 1 ML | Refills: 3 | Status: SHIPPED | OUTPATIENT
Start: 2024-06-19

## 2024-06-19 RX ADMIN — MEDROXYPROGESTERONE ACETATE 150 MG: 150 INJECTION, SUSPENSION INTRAMUSCULAR at 10:13

## 2024-06-19 NOTE — PROGRESS NOTES
Depo-Provera     [x]   Patient provided box yes   0 Refills remain  Refills submitted yes  Last  Annual Date / Birth control check : 8/2/23, annual scheduled for 9/4/24  Last Depo date: 4/3/24  Side effects: no  HCG: no  Given by: Brittany Gilmore  Site: Left deltoid    Calcium supplement daily teaching  Condoms for 2 weeks following first injection dose.

## 2024-09-04 ENCOUNTER — CLINICAL SUPPORT (OUTPATIENT)
Dept: OBGYN CLINIC | Facility: CLINIC | Age: 29
End: 2024-09-04

## 2024-09-04 DIAGNOSIS — Z30.42 ENCOUNTER FOR MANAGEMENT AND INJECTION OF DEPO-PROVERA: Primary | ICD-10-CM

## 2024-09-04 PROCEDURE — 96372 THER/PROPH/DIAG INJ SC/IM: CPT

## 2024-09-04 RX ADMIN — MEDROXYPROGESTERONE ACETATE 150 MG: 150 INJECTION, SUSPENSION INTRAMUSCULAR at 09:51

## 2024-09-04 NOTE — PROGRESS NOTES
Depo-Provera     [x]   Patient provided box yes   0 Refills remain  Refills submitted no  Last  Annual Date / Birth control check : annual scheduled for 10/2/24  Last Depo date: 6/19/24  Side effects: no  HCG: no  Given by: Brittany Gilmore  Site: Left deltoid    Calcium supplement daily teaching  Condoms for 2 weeks following first injection dose.

## 2024-10-02 ENCOUNTER — ANNUAL EXAM (OUTPATIENT)
Dept: OBGYN CLINIC | Facility: CLINIC | Age: 29
End: 2024-10-02

## 2024-10-02 VITALS
DIASTOLIC BLOOD PRESSURE: 74 MMHG | BODY MASS INDEX: 24.35 KG/M2 | SYSTOLIC BLOOD PRESSURE: 114 MMHG | HEART RATE: 78 BPM | HEIGHT: 60 IN | WEIGHT: 124 LBS

## 2024-10-02 DIAGNOSIS — Z11.3 SCREENING EXAMINATION FOR STD (SEXUALLY TRANSMITTED DISEASE): ICD-10-CM

## 2024-10-02 DIAGNOSIS — Z12.4 CERVICAL CANCER SCREENING: ICD-10-CM

## 2024-10-02 DIAGNOSIS — Z12.39 ENCOUNTER FOR BREAST CANCER SCREENING USING NON-MAMMOGRAM MODALITY: ICD-10-CM

## 2024-10-02 DIAGNOSIS — Z01.419 WOMEN'S ANNUAL ROUTINE GYNECOLOGICAL EXAMINATION: Primary | ICD-10-CM

## 2024-10-02 PROCEDURE — 99395 PREV VISIT EST AGE 18-39: CPT | Performed by: NURSE PRACTITIONER

## 2024-10-02 PROCEDURE — 88175 CYTOPATH C/V AUTO FLUID REDO: CPT | Performed by: NURSE PRACTITIONER

## 2024-10-02 PROCEDURE — 87491 CHLMYD TRACH DNA AMP PROBE: CPT | Performed by: NURSE PRACTITIONER

## 2024-10-02 PROCEDURE — 87591 N.GONORRHOEAE DNA AMP PROB: CPT | Performed by: NURSE PRACTITIONER

## 2024-10-02 RX ORDER — MEDROXYPROGESTERONE ACETATE 150 MG/ML
150 INJECTION, SUSPENSION INTRAMUSCULAR
Qty: 1 ML | Refills: 3 | Status: SHIPPED | OUTPATIENT
Start: 2024-10-02

## 2024-10-02 NOTE — PROGRESS NOTES
ANNUAL GYNECOLOGICAL EXAMINATION    Marika Schneider is a 29 y.o. female who presents today for annual GYN exam.  Her last pap smear was performed 2021 and result was NILM.  She reports no history of abnormal pap smears in her past. She had HIV screening performed  and it was negative.  She reports menses as absent with depo-provera.  No LMP recorded (lmp unknown). Patient has had an injection.  Her general medical history has been reviewed and she reports it as follows:    Past Medical History:   Diagnosis Date    Chronic pain     low back     Hyperparathyroidism (HCC)     Rubella non-immune status, antepartum 2022    Rubella equivocal  Will require MMR after delivery     (spontaneous vaginal delivery) 2023    Uses Kiswahili as primary spoken language     Vaccine counseling 2022     Past Surgical History:   Procedure Laterality Date    [place] PARATHYROIDECTOMY/EXPLORATION PARATHYROIDS Right 06/10/2021    Procedure: Right minimally invasive parathyroidectomy; intraoperative PTH monitoring;  Surgeon: Jose Elias Davalos MD;  Location[facility];  Service: Surgical Oncology     OB History          2    Para   2    Term   1       1    AB        Living   2         SAB        IAB        Ectopic        Multiple   0    Live Births   2               Social History     Tobacco Use    Smoking status: Never    Smokeless tobacco: Never   Vaping Use    Vaping status: Never Used   Substance Use Topics    Alcohol use: Never    Drug use: Never     Social History     Substance and Sexual Activity   Sexual Activity Yes    Partners: Male    Birth control/protection: None, Injection     Cancer-related family history is negative for Breast cancer, Colon cancer, and Ovarian cancer.    Current Outpatient Medications   Medication Instructions    acetaminophen (TYLENOL) 650 mg, Oral, Every 8 hours PRN    famotidine (PEPCID) 20 mg tablet TAKE 1 TABLET BY MOUTH TWICE A DAY    medroxyPROGESTERone  (DEPO-PROVERA) 150 mg, Intramuscular, Every 3 months    ondansetron (ZOFRAN) 4 mg, Oral, Every 8 hours PRN    Prenatal 27-1 MG TABS 1 tablet, Oral, Daily    valACYclovir (VALTREX) 500 mg, Oral, 2 times daily       Review of Systems:  Review of Systems   Constitutional: Negative.    Gastrointestinal: Negative.    Genitourinary: Negative.    Skin: Negative.        Physical Exam:  /74 (BP Location: Right arm, Patient Position: Sitting)   Pulse 78   Ht 5' (1.524 m)   Wt 56.2 kg (124 lb)   LMP  (LMP Unknown)   BMI 24.22 kg/m²   Physical Exam  Constitutional:       General: She is not in acute distress.     Appearance: Normal appearance.   Genitourinary:      Vulva and bladder normal.      No lesions in the vagina.      No vaginal erythema or ulceration.        Right Adnexa: not tender and no mass present.     Left Adnexa: not tender and no mass present.     No cervical motion tenderness or lesion.      Uterus is not enlarged or tender.      No uterine mass detected.  Breasts:     Right: No mass, nipple discharge or skin change.      Left: No mass, nipple discharge or skin change.   Cardiovascular:      Rate and Rhythm: Normal rate and regular rhythm.   Pulmonary:      Effort: Pulmonary effort is normal.      Breath sounds: Normal breath sounds.   Abdominal:      General: Abdomen is flat.      Palpations: Abdomen is soft.   Musculoskeletal:      Cervical back: Neck supple.   Neurological:      Mental Status: She is alert.   Skin:     General: Skin is warm and dry.   Psychiatric:         Mood and Affect: Mood normal.         Behavior: Behavior normal.   Vitals reviewed.         Assessment/Plan:   1. Normal well-woman GYN exam.  2. Cervical cancer screening:  Normal cervical exam.  Pap smear done.  Has received HPV vaccine in the past    3. STD screening:  Orders placed for vaginal GC/CT cultures.  Orders placed for serum anti-HIV, anti-HCV, HbsAg, syphilis panel.   4. Breast cancer screening:  Normal breast  exam. Reviewed breast self-awareness.   5. Depression Screening: Patient's depression screening was assessed with a PHQ-2 score of 0. Clinically patient does not have depression. No treatment is required.     6. BMI Counseling: Body mass index is 24.22 kg/m². Discussed the patient's BMI with her. The BMI is normal.    7. Contraception:  Doing well on depo-provera. Refills approved.    8. Return to office every 3 months for depo and in one year for annual exam.    Reviewed with patient that [facility] immediately, but that they will not necessarily be reviewed by me immediately.  Explained that I will review results at my earliest opportunity and contact patient appropriately.

## 2024-10-04 LAB
C TRACH DNA SPEC QL NAA+PROBE: NEGATIVE
N GONORRHOEA DNA SPEC QL NAA+PROBE: NEGATIVE

## 2024-10-07 ENCOUNTER — TELEPHONE (OUTPATIENT)
Dept: OBGYN CLINIC | Facility: CLINIC | Age: 29
End: 2024-10-07

## 2024-10-07 LAB
LAB AP GYN PRIMARY INTERPRETATION: NORMAL
Lab: NORMAL

## 2024-10-07 NOTE — TELEPHONE ENCOUNTER
Called pt and informed of results pt verbalized understanding.      ----- Message from STEPHEN Fournier sent at 10/7/2024  2:25 PM EDT -----  Please let her know the pap and sti testing is negative. Thank you!

## 2024-11-29 ENCOUNTER — CLINICAL SUPPORT (OUTPATIENT)
Dept: OBGYN CLINIC | Facility: CLINIC | Age: 29
End: 2024-11-29

## 2024-11-29 DIAGNOSIS — Z30.42 ENCOUNTER FOR MANAGEMENT AND INJECTION OF DEPO-PROVERA: Primary | ICD-10-CM

## 2024-11-29 PROCEDURE — 96372 THER/PROPH/DIAG INJ SC/IM: CPT

## 2024-11-29 RX ORDER — MEDROXYPROGESTERONE ACETATE 150 MG/ML
150 INJECTION, SUSPENSION INTRAMUSCULAR ONCE
Status: COMPLETED | OUTPATIENT
Start: 2024-11-29 | End: 2024-11-29

## 2024-11-29 RX ADMIN — MEDROXYPROGESTERONE ACETATE 150 MG: 150 INJECTION, SUSPENSION INTRAMUSCULAR at 10:19

## 2024-12-13 NOTE — ADDENDUM NOTE
Addended by: Mercedez Lira on: 2022 11:56 AM     Modules accepted: Level of Service
Alert and oriented to person, place and time

## 2025-02-26 ENCOUNTER — CLINICAL SUPPORT (OUTPATIENT)
Dept: OBGYN CLINIC | Facility: CLINIC | Age: 30
End: 2025-02-26

## 2025-02-26 DIAGNOSIS — Z30.42 ENCOUNTER FOR MANAGEMENT AND INJECTION OF DEPO-PROVERA: Primary | ICD-10-CM

## 2025-02-26 PROCEDURE — 96372 THER/PROPH/DIAG INJ SC/IM: CPT

## 2025-02-26 RX ADMIN — MEDROXYPROGESTERONE ACETATE 150 MG: 150 INJECTION, SUSPENSION INTRAMUSCULAR at 16:58

## 2025-02-27 RX ORDER — MEDROXYPROGESTERONE ACETATE 150 MG/ML
150 INJECTION, SUSPENSION INTRAMUSCULAR ONCE
Status: COMPLETED | OUTPATIENT
Start: 2025-02-27 | End: 2025-02-26

## 2025-02-27 NOTE — PROGRESS NOTES
Depo given: 2/26/25 Left Deltoid    Refills: 1    Last depo given: 11/29/24    Annual due: 10/2025

## 2025-03-12 ENCOUNTER — OFFICE VISIT (OUTPATIENT)
Dept: DENTISTRY | Facility: CLINIC | Age: 30
End: 2025-03-12

## 2025-03-12 VITALS — DIASTOLIC BLOOD PRESSURE: 79 MMHG | SYSTOLIC BLOOD PRESSURE: 113 MMHG | HEART RATE: 93 BPM

## 2025-03-12 DIAGNOSIS — Z01.21 ENCOUNTER FOR DENTAL EXAMINATION AND CLEANING WITH ABNORMAL FINDINGS: Primary | ICD-10-CM

## 2025-03-12 PROCEDURE — D0210 INTRAORAL - COMPLETE SERIES OF RADIOGRAPHIC IMAGES: HCPCS

## 2025-03-12 PROCEDURE — D0140 LIMITED ORAL EVALUATION - PROBLEM FOCUSED: HCPCS

## 2025-03-12 NOTE — PROGRESS NOTES
"Procedure Details   - LIMITED ORAL EVALUATION - PROBLEM FOCUSED   - INTRAORAL - COMPLETE SERIES OF RADIOGRAPHIC IMAGES    FMX WITH LIMITED EXAM    I Pad Indonesian [id number]    Reviewed medical and dental history with patient,  ASA:II  PAIN:0  CC: \" I want to see if I can get a tooth.\"  Patient is pointing to #8 and 9    FMX was obtained    Limited Exam was completed By Dr.G Ann  DrSamantha Explained to patient that FMD would be first on the treatment plan.   Following the treatment plan, patient is to return for completion of NERY with Dr. KATHERINE Arroyo    Patient is currently missing #8 and #9 root is foreshortened.  Patient is interested in being evaluated for implants on #8 and possibly ext #9 and place implant as well.    NV: Full mouth debridement and meet Dr.M Arroyo per Mamta / May be the resident placing the implants       "

## 2025-03-12 NOTE — DENTAL PROCEDURE DETAILS
"  FMX WITH LIMITED EXAM    I Pad Canadian [id number]    Reviewed medical and dental history with patient,  ASA:II  PAIN:0  CC: \" I want to see if I can get a tooth.\"  Patient is pointing to #8 and 9    FMX was obtained    Limited Exam was completed By Dr.G Ann  DrSamantha Explained to patient that FMD would be first on the treatment plan.   Following the treatment plan, patient is to return for completion of NERY with Dr. KATHERINE Arroyo    Patient is currently missing #8 and #9 root is foreshortened.  Patient is interested in being evaluated for implants on #8 and possibly ext #9 and place implant as well.    NV: Full mouth debridement and meet Dr.M Arroyo per Mamta / May be the resident placing the implants     "

## 2025-03-12 NOTE — PROGRESS NOTES
Procedure Details   - LIMITED ORAL EVALUATION - PROBLEM FOCUSED   - INTRAORAL - COMPLETE SERIES OF RADIOGRAPHIC IMAGES

## 2025-05-14 ENCOUNTER — OFFICE VISIT (OUTPATIENT)
Dept: OBGYN CLINIC | Facility: CLINIC | Age: 30
End: 2025-05-14

## 2025-05-14 VITALS
HEART RATE: 101 BPM | HEIGHT: 60 IN | DIASTOLIC BLOOD PRESSURE: 84 MMHG | BODY MASS INDEX: 25.6 KG/M2 | SYSTOLIC BLOOD PRESSURE: 120 MMHG | WEIGHT: 130.4 LBS

## 2025-05-14 DIAGNOSIS — Z30.42 ENCOUNTER FOR MANAGEMENT AND INJECTION OF DEPO-PROVERA: Primary | ICD-10-CM

## 2025-05-14 DIAGNOSIS — Z01.419 WOMEN'S ANNUAL ROUTINE GYNECOLOGICAL EXAMINATION: ICD-10-CM

## 2025-05-14 RX ORDER — MEDROXYPROGESTERONE ACETATE 150 MG/ML
150 INJECTION, SUSPENSION INTRAMUSCULAR ONCE
Status: COMPLETED | OUTPATIENT
Start: 2025-05-14 | End: 2025-05-14

## 2025-05-14 RX ADMIN — MEDROXYPROGESTERONE ACETATE 150 MG: 150 INJECTION, SUSPENSION INTRAMUSCULAR at 16:50

## 2025-05-14 NOTE — PROGRESS NOTES
PROBLEM GYNECOLOGICAL VISIT    Marika Schneider is a 29 y.o. female who presents today with complaint of breakthrough bleeding on depo.  Her general medical history has been reviewed and she reports it as follows:    Past Medical History:   Diagnosis Date    Chronic pain     low back     Hyperparathyroidism (HCC)     Rubella non-immune status, antepartum 2022    Rubella equivocal  Will require MMR after delivery     (spontaneous vaginal delivery) 2023    Uses English as primary spoken language     Vaccine counseling 2022     Past Surgical History:   Procedure Laterality Date    [place] PARATHYROIDECTOMY/EXPLORATION PARATHYROIDS Right 06/10/2021    Procedure: Right minimally invasive parathyroidectomy; intraoperative PTH monitoring;  Surgeon: Jose Elias Davalos MD;  [facility];  Service: Surgical Oncology     OB History          2    Para   2    Term   1       1    AB        Living   2         SAB        IAB        Ectopic        Multiple   0    Live Births   2               Social History[1]  Social History     Substance and Sexual Activity   Sexual Activity Yes    Partners: Male    Birth control/protection: Injection       Current Outpatient Medications   Medication Instructions    medroxyPROGESTERone (DEPO-PROVERA) 150 mg, Intramuscular, Every 3 months       History of Present Illness:   Marika presents today for her depo injection and reporting that she had some break through bleeding. She had hr last last depo on 25. She reports that she had light spotting for about two weeks, this has since stopped for about 4 days. She denies any pelvic pain or vaginitis symptoms.    Review of Systems:  Review of Systems   Constitutional: Negative.    Gastrointestinal: Negative.    Genitourinary:         Breakthrough bleeding        Physical Exam:  /84 (BP Location: Right arm, Patient Position: Sitting)   Pulse 101   Ht 5' (1.524 m)   Wt 59.1 kg (130 lb 6.4 oz)   LMP  (LMP  Unknown)   BMI 25.47 kg/m²   Physical Exam  Constitutional:       General: She is not in acute distress.     Appearance: Normal appearance.     Neurological:      Mental Status: She is alert.     Skin:     General: Skin is warm and dry.     Psychiatric:         Mood and Affect: Mood normal.         Behavior: Behavior normal.   Vitals reviewed.         Assessment:   1. Encounter for management and injection of depo-Provera     Plan:   1. Reviewed possible bleeding patterns with depo. Currently bleeding has resolved and she has no other symptoms.    2. Declines STI screening    3. Depo dose given today. Patient will monitor bleeding pattern.    4. Patient's depression screening was assessed with a PHQ-2 score of 0.Clinically patient does not have depression. No treatment is required.     5. Return for next depo administration or sooner if needed    Reviewed with patient that test results are [facility] immediately, but that they will not necessarily be reviewed by me immediately.  Explained that I will review results at my earliest opportunity and contact patient appropriately.       [1]   Social History  Tobacco Use    Smoking status: Never    Smokeless tobacco: Never   Vaping Use    Vaping status: Never Used   Substance Use Topics    Alcohol use: Never    Drug use: Never

## 2025-05-15 RX ORDER — MEDROXYPROGESTERONE ACETATE 150 MG/ML
150 INJECTION, SUSPENSION INTRAMUSCULAR
Qty: 1 ML | Refills: 3 | Status: SHIPPED | OUTPATIENT
Start: 2025-05-15

## 2025-05-15 NOTE — PROGRESS NOTES
Depo given: 5/14/2025 Left Deltoid    Refills: 2    Last depo given: 2/26/2025    Annual due: 10/2025

## 2025-06-17 NOTE — TELEPHONE ENCOUNTER
----- Message from Yvette Katz sent at 6/13/2025  8:08 AM EDT -----  Please call stress test is positive needs a left heart cath   Thanks   ----- Message -----  From: Interface, Radiology Results In  Sent: 6/11/2025   2:17 PM EDT  To: Yvette Katz, SHILA-CNP     Please place updated referral for ENDO

## 2025-06-30 ENCOUNTER — TELEPHONE (OUTPATIENT)
Dept: INTERNAL MEDICINE CLINIC | Facility: CLINIC | Age: 30
End: 2025-06-30

## 2025-06-30 ENCOUNTER — OFFICE VISIT (OUTPATIENT)
Dept: INTERNAL MEDICINE CLINIC | Facility: CLINIC | Age: 30
End: 2025-06-30

## 2025-06-30 VITALS
WEIGHT: 125 LBS | BODY MASS INDEX: 24.54 KG/M2 | SYSTOLIC BLOOD PRESSURE: 111 MMHG | HEART RATE: 85 BPM | HEIGHT: 60 IN | TEMPERATURE: 98 F | DIASTOLIC BLOOD PRESSURE: 73 MMHG | OXYGEN SATURATION: 99 % | RESPIRATION RATE: 16 BRPM

## 2025-06-30 DIAGNOSIS — Z98.890 STATUS POST PARATHYROIDECTOMY: Primary | ICD-10-CM

## 2025-06-30 DIAGNOSIS — L65.9 ALOPECIA: ICD-10-CM

## 2025-06-30 DIAGNOSIS — Z90.89 STATUS POST PARATHYROIDECTOMY: Primary | ICD-10-CM

## 2025-06-30 NOTE — ASSESSMENT & PLAN NOTE
Patient reports having alopecia at her hairline and temples past 6 months.  She is currently breast-feeding and has been on the Depo-Provera injection.  Will check for anemia, TSH, vitamin D iron panel, and vitamin B12.  Advised patient to over-the-counter prenatal vitamin as she is still breast-feeding, patient expresses understanding.    Orders:    CBC and differential; Future    TSH, 3rd generation with Free T4 reflex; Future    Vitamin D 25 hydroxy; Future    TIBC Panel (incl. Iron, TIBC, % Iron Saturation); Future    Vitamin B12; Future

## 2025-06-30 NOTE — TELEPHONE ENCOUNTER
Folder Color: Blue     Name of Form: Learner's Permit     Form to be filled out by: Ticoi     Form to be Faxed: (fax number), mailed (address), or picked up (by whom)     Patient made aware of 10 day business policy.

## 2025-06-30 NOTE — ASSESSMENT & PLAN NOTE
S/p single gland parathyroidectomy in 2021.  Per endocrinology notes, patient was referred to genetic counseling given age of onset for MEN1 testing.  Patient reports that she is unable to have the testing done as she needed samples from family members in [place].  She has not seen an endocrinologist since 2023.  Will check calcium, PTH, vitamin D, and refer to endocrinology for follow-up.    Orders:    Comprehensive metabolic panel; Future    Vitamin D 25 hydroxy; Future    PTH, intact; Future    Hepatitis C antibody; Future    Ambulatory Referral to Endocrinology; Future

## 2025-06-30 NOTE — PROGRESS NOTES
Name: Marika Schneider      : 1995      MRN: [id number]  Encounter Provider: Kate Sharma MD  Encounter Date: 2025   Encounter department: [facility] [place]  :  Assessment & Plan  Status post parathyroidectomy  S/p single gland parathyroidectomy in .  Per endocrinology notes, patient was referred to genetic counseling given age of onset for MEN1 testing.  Patient reports that she is unable to have the testing done as she needed samples from family members in [place].  She has not seen an endocrinologist since .  Will check calcium, PTH, vitamin D, and refer to endocrinology for follow-up.    Orders:    Comprehensive metabolic panel; Future    Vitamin D 25 hydroxy; Future    PTH, intact; Future    Hepatitis C antibody; Future    Ambulatory Referral to Endocrinology; Future    Alopecia  Patient reports having alopecia at her hairline and temples past 6 months.  She is currently breast-feeding and has been on the Depo-Provera injection.  Will check for anemia, TSH, vitamin D iron panel, and vitamin B12.  Advised patient to over-the-counter prenatal vitamin as she is still breast-feeding, patient expresses understanding.    Orders:    CBC and differential; Future    TSH, 3rd generation with Free T4 reflex; Future    Vitamin D 25 hydroxy; Future    TIBC Panel (incl. Iron, TIBC, % Iron Saturation); Future    Vitamin B12; Future           History of Present Illness   This is a 30 year old female with PMH of hyperparathyroidism s/p parathyroidectomy in  and chronic constipation presents to clinic to establish care.  Patient complains of hair thinning around her temples and front hairline for the past 6 months.  She denies noticing circular bald spots, changes in weight, diarrhea, and dry skin.  Patient does report that she has chronic constipation; she has not been taking anything to help her bowel movements.  She states that she has 2 bowel movements a day currently.  She  denies abdominal pain, nausea, vomiting, and hematochezia.  Patient is currently breast-feeding and is on Depo-Provera injection.  She denies smoking, alcohol use, and marijuana use.       Review of Systems   Constitutional:  Negative for chills, fever and unexpected weight change.   HENT:  Negative for ear pain and sore throat.    Eyes:  Negative for pain and visual disturbance.   Respiratory:  Negative for cough and shortness of breath.    Cardiovascular:  Negative for chest pain and palpitations.   Gastrointestinal:  Positive for constipation. Negative for abdominal pain, blood in stool, diarrhea, nausea and vomiting.   Endocrine: Negative for cold intolerance and heat intolerance.   Genitourinary:  Negative for dysuria and hematuria.   Musculoskeletal:  Negative for arthralgias and back pain.   Skin:  Negative for color change and rash.   Neurological:  Negative for dizziness, seizures, syncope and headaches.   All other systems reviewed and are negative.      Objective   /73 (BP Location: Right arm, Patient Position: Sitting, Cuff Size: Standard)   Pulse 85   Temp 98 °F (36.7 °C) (Temporal)   Resp 16   Ht 5' (1.524 m)   Wt 56.7 kg (125 lb)   SpO2 99%   Breastfeeding Yes   BMI 24.41 kg/m²      Physical Exam  Constitutional:       General: She is not in acute distress.  HENT:      Head: Normocephalic and atraumatic. Hair is normal.     Eyes:      Conjunctiva/sclera: Conjunctivae normal.      Pupils: Pupils are equal, round, and reactive to light.     Neck:      Thyroid: No thyroid mass or thyromegaly.     Cardiovascular:      Rate and Rhythm: Normal rate and regular rhythm.      Pulses: Normal pulses.      Heart sounds: S1 normal and S2 normal. No murmur heard.     No friction rub. No gallop.   Pulmonary:      Breath sounds: Normal breath sounds. No wheezing, rhonchi or rales.   Abdominal:      General: Bowel sounds are normal. There is no distension.      Palpations: Abdomen is soft.       Tenderness: There is no abdominal tenderness.     Musculoskeletal:      Right lower leg: No edema.      Left lower leg: No edema.     Skin:     General: Skin is warm and dry.     Neurological:      General: No focal deficit present.      Mental Status: She is alert and oriented to person, place, and time.

## 2025-07-01 ENCOUNTER — TELEPHONE (OUTPATIENT)
Age: 30
End: 2025-07-01

## 2025-07-01 NOTE — TELEPHONE ENCOUNTER
Attempted to call and schedule a follow up using  [id number]. Called twice, someone picked up the line but did not answer the . She was last seen 11/9/23 by Dr. Hill.

## 2025-07-03 ENCOUNTER — APPOINTMENT (OUTPATIENT)
Dept: LAB | Facility: CLINIC | Age: 30
End: 2025-07-03
Attending: NURSE PRACTITIONER

## 2025-07-03 DIAGNOSIS — L65.9 ALOPECIA: ICD-10-CM

## 2025-07-03 DIAGNOSIS — Z90.89 STATUS POST PARATHYROIDECTOMY: ICD-10-CM

## 2025-07-03 DIAGNOSIS — Z11.3 SCREENING EXAMINATION FOR STD (SEXUALLY TRANSMITTED DISEASE): ICD-10-CM

## 2025-07-03 DIAGNOSIS — Z98.890 STATUS POST PARATHYROIDECTOMY: ICD-10-CM

## 2025-07-03 LAB
25(OH)D3 SERPL-MCNC: 11.2 NG/ML (ref 30–100)
ALBUMIN SERPL BCG-MCNC: 4.4 G/DL (ref 3.5–5)
ALP SERPL-CCNC: 57 U/L (ref 34–104)
ALT SERPL W P-5'-P-CCNC: 15 U/L (ref 7–52)
ANION GAP SERPL CALCULATED.3IONS-SCNC: 7 MMOL/L (ref 4–13)
AST SERPL W P-5'-P-CCNC: 14 U/L (ref 13–39)
BASOPHILS # BLD AUTO: 0.03 THOUSANDS/ÂΜL (ref 0–0.1)
BASOPHILS NFR BLD AUTO: 1 % (ref 0–1)
BILIRUB SERPL-MCNC: 0.96 MG/DL (ref 0.2–1)
BUN SERPL-MCNC: 13 MG/DL (ref 5–25)
CALCIUM SERPL-MCNC: 9.7 MG/DL (ref 8.4–10.2)
CHLORIDE SERPL-SCNC: 109 MMOL/L (ref 96–108)
CO2 SERPL-SCNC: 24 MMOL/L (ref 21–32)
CREAT SERPL-MCNC: 0.65 MG/DL (ref 0.6–1.3)
EOSINOPHIL # BLD AUTO: 0.15 THOUSAND/ÂΜL (ref 0–0.61)
EOSINOPHIL NFR BLD AUTO: 2 % (ref 0–6)
ERYTHROCYTE [DISTWIDTH] IN BLOOD BY AUTOMATED COUNT: 11.9 % (ref 11.6–15.1)
GFR SERPL CREATININE-BSD FRML MDRD: 121 ML/MIN/1.73SQ M
GLUCOSE P FAST SERPL-MCNC: 96 MG/DL (ref 65–99)
HCT VFR BLD AUTO: 42.8 % (ref 34.8–46.1)
HCV AB SER QL: NORMAL
HGB BLD-MCNC: 14.5 G/DL (ref 11.5–15.4)
HIV 1+2 AB+HIV1 P24 AG SERPL QL IA: NORMAL
IMM GRANULOCYTES # BLD AUTO: 0.02 THOUSAND/UL (ref 0–0.2)
IMM GRANULOCYTES NFR BLD AUTO: 0 % (ref 0–2)
IRON SATN MFR SERPL: 36 % (ref 15–50)
IRON SERPL-MCNC: 128 UG/DL (ref 50–212)
LYMPHOCYTES # BLD AUTO: 2.53 THOUSANDS/ÂΜL (ref 0.6–4.47)
LYMPHOCYTES NFR BLD AUTO: 38 % (ref 14–44)
MCH RBC QN AUTO: 31.4 PG (ref 26.8–34.3)
MCHC RBC AUTO-ENTMCNC: 33.9 G/DL (ref 31.4–37.4)
MCV RBC AUTO: 93 FL (ref 82–98)
MONOCYTES # BLD AUTO: 0.5 THOUSAND/ÂΜL (ref 0.17–1.22)
MONOCYTES NFR BLD AUTO: 8 % (ref 4–12)
NEUTROPHILS # BLD AUTO: 3.41 THOUSANDS/ÂΜL (ref 1.85–7.62)
NEUTS SEG NFR BLD AUTO: 51 % (ref 43–75)
NRBC BLD AUTO-RTO: 0 /100 WBCS
PLATELET # BLD AUTO: 279 THOUSANDS/UL (ref 149–390)
PMV BLD AUTO: 9.2 FL (ref 8.9–12.7)
POTASSIUM SERPL-SCNC: 3.9 MMOL/L (ref 3.5–5.3)
PROT SERPL-MCNC: 7.6 G/DL (ref 6.4–8.4)
PTH-INTACT SERPL-MCNC: 81.9 PG/ML (ref 12–88)
RBC # BLD AUTO: 4.62 MILLION/UL (ref 3.81–5.12)
SODIUM SERPL-SCNC: 140 MMOL/L (ref 135–147)
TIBC SERPL-MCNC: 355.6 UG/DL (ref 250–450)
TRANSFERRIN SERPL-MCNC: 254 MG/DL (ref 203–362)
TREPONEMA PALLIDUM IGG+IGM AB [PRESENCE] IN SERUM OR PLASMA BY IMMUNOASSAY: NORMAL
TSH SERPL DL<=0.05 MIU/L-ACNC: 1.5 UIU/ML (ref 0.45–4.5)
UIBC SERPL-MCNC: 228 UG/DL (ref 155–355)
VIT B12 SERPL-MCNC: 287 PG/ML (ref 180–914)
WBC # BLD AUTO: 6.64 THOUSAND/UL (ref 4.31–10.16)

## 2025-07-03 PROCEDURE — 86803 HEPATITIS C AB TEST: CPT

## 2025-07-03 PROCEDURE — 82306 VITAMIN D 25 HYDROXY: CPT

## 2025-07-03 PROCEDURE — 83550 IRON BINDING TEST: CPT

## 2025-07-03 PROCEDURE — 36415 COLL VENOUS BLD VENIPUNCTURE: CPT

## 2025-07-03 PROCEDURE — 83970 ASSAY OF PARATHORMONE: CPT

## 2025-07-03 PROCEDURE — 85025 COMPLETE CBC W/AUTO DIFF WBC: CPT

## 2025-07-03 PROCEDURE — 86780 TREPONEMA PALLIDUM: CPT

## 2025-07-03 PROCEDURE — 80053 COMPREHEN METABOLIC PANEL: CPT

## 2025-07-03 PROCEDURE — 83540 ASSAY OF IRON: CPT

## 2025-07-03 PROCEDURE — 84443 ASSAY THYROID STIM HORMONE: CPT

## 2025-07-03 PROCEDURE — 87389 HIV-1 AG W/HIV-1&-2 AB AG IA: CPT

## 2025-07-03 PROCEDURE — 82607 VITAMIN B-12: CPT

## 2025-07-15 ENCOUNTER — TELEPHONE (OUTPATIENT)
Dept: INTERNAL MEDICINE CLINIC | Facility: CLINIC | Age: 30
End: 2025-07-15

## 2025-07-15 DIAGNOSIS — E53.8 B12 DEFICIENCY: ICD-10-CM

## 2025-07-15 DIAGNOSIS — E55.9 VITAMIN D DEFICIENCY: ICD-10-CM

## 2025-07-15 RX ORDER — ERGOCALCIFEROL 1.25 MG/1
50000 CAPSULE, LIQUID FILLED ORAL WEEKLY
Qty: 8 CAPSULE | Refills: 0 | Status: SHIPPED | OUTPATIENT
Start: 2025-07-15 | End: 2025-09-03

## 2025-07-15 RX ORDER — CYANOCOBALAMIN (VITAMIN B-12) 500 MCG
500 TABLET ORAL DAILY
Qty: 90 TABLET | Refills: 0 | Status: SHIPPED | OUTPATIENT
Start: 2025-07-15

## 2025-08-02 DIAGNOSIS — Z01.419 WOMEN'S ANNUAL ROUTINE GYNECOLOGICAL EXAMINATION: ICD-10-CM

## 2025-08-04 ENCOUNTER — TELEPHONE (OUTPATIENT)
Dept: INTERNAL MEDICINE CLINIC | Facility: CLINIC | Age: 30
End: 2025-08-04

## 2025-08-04 RX ORDER — MEDROXYPROGESTERONE ACETATE 150 MG/ML
INJECTION, SUSPENSION INTRAMUSCULAR
Refills: 3 | OUTPATIENT
Start: 2025-08-04

## 2025-08-06 ENCOUNTER — CLINICAL SUPPORT (OUTPATIENT)
Dept: OBGYN CLINIC | Facility: CLINIC | Age: 30
End: 2025-08-06

## 2025-08-06 DIAGNOSIS — Z30.42 ENCOUNTER FOR MANAGEMENT AND INJECTION OF DEPO-PROVERA: Primary | ICD-10-CM

## 2025-08-06 PROCEDURE — 96372 THER/PROPH/DIAG INJ SC/IM: CPT

## 2025-08-06 RX ADMIN — MEDROXYPROGESTERONE ACETATE 150 MG: 150 INJECTION, SUSPENSION INTRAMUSCULAR at 17:05

## (undated) DEVICE — PLUMEPEN PRO 10FT

## (undated) DEVICE — DRAPE SURGIKIT SADDLE BAG

## (undated) DEVICE — LIGACLIP MCA MULTIPLE CLIP APPLIERS, 20 SMALL CLIPS: Brand: LIGACLIP

## (undated) DEVICE — ELECTRODE BLADE MOD E-Z CLEAN 2.5IN 6.4CM -0012M

## (undated) DEVICE — INTENDED FOR TISSUE SEPARATION, AND OTHER PROCEDURES THAT REQUIRE A SHARP SURGICAL BLADE TO PUNCTURE OR CUT.: Brand: BARD-PARKER ® CARBON RIB-BACK BLADES

## (undated) DEVICE — GLOVE INDICATOR PI UNDERGLOVE SZ 7 BLUE

## (undated) DEVICE — NEEDLE 25G X 1 1/2

## (undated) DEVICE — SUT MONOCRYL 5-0 P-3 18 IN Y493G

## (undated) DEVICE — SUT VICRYL 4-0 PS-2 27 IN J426H

## (undated) DEVICE — CHLORAPREP HI-LITE 10.5ML ORANGE

## (undated) DEVICE — PACK UNIVERSAL NECK

## (undated) DEVICE — GLOVE SRG BIOGEL ECLIPSE 7

## (undated) DEVICE — SUT VICRYL 3-0 SH 27 IN J416H

## (undated) DEVICE — 3M™ STERI-STRIP™ COMPOUND BENZOIN TINCTURE 40 BAGS/CARTON 4 CARTONS/CASE C1544: Brand: 3M™ STERI-STRIP™

## (undated) DEVICE — 3M™ STERI-STRIP™ REINFORCED ADHESIVE SKIN CLOSURES, R1547, 1/2 IN X 4 IN (12 MM X 100 MM), 6 STRIPS/ENVELOPE: Brand: 3M™ STERI-STRIP™

## (undated) DEVICE — MINOR PROCEDURE DRAPE: Brand: CONVERTORS